# Patient Record
Sex: FEMALE | Race: WHITE | HISPANIC OR LATINO | Employment: FULL TIME | URBAN - METROPOLITAN AREA
[De-identification: names, ages, dates, MRNs, and addresses within clinical notes are randomized per-mention and may not be internally consistent; named-entity substitution may affect disease eponyms.]

---

## 2019-03-16 ENCOUNTER — HOSPITAL ENCOUNTER (INPATIENT)
Facility: MEDICAL CENTER | Age: 44
LOS: 4 days | DRG: 640 | End: 2019-03-20
Attending: EMERGENCY MEDICINE | Admitting: HOSPITALIST
Payer: MEDICAID

## 2019-03-16 ENCOUNTER — APPOINTMENT (OUTPATIENT)
Dept: RADIOLOGY | Facility: MEDICAL CENTER | Age: 44
DRG: 640 | End: 2019-03-16
Attending: EMERGENCY MEDICINE
Payer: MEDICAID

## 2019-03-16 DIAGNOSIS — E87.20 METABOLIC ACIDOSIS: ICD-10-CM

## 2019-03-16 DIAGNOSIS — G92.9 TOXIC ENCEPHALOPATHY: ICD-10-CM

## 2019-03-16 DIAGNOSIS — E87.20 LACTIC ACID INCREASED: ICD-10-CM

## 2019-03-16 DIAGNOSIS — R44.3 HALLUCINATIONS: ICD-10-CM

## 2019-03-16 LAB
ALBUMIN SERPL BCP-MCNC: 3.9 G/DL (ref 3.2–4.9)
ALBUMIN/GLOB SERPL: 1.3 G/DL
ALP SERPL-CCNC: 101 U/L (ref 30–99)
ALT SERPL-CCNC: 52 U/L (ref 2–50)
AMPHET UR QL SCN: NEGATIVE
ANION GAP SERPL CALC-SCNC: 26 MMOL/L (ref 0–11.9)
APAP SERPL-MCNC: <10 UG/ML (ref 10–30)
APPEARANCE UR: ABNORMAL
AST SERPL-CCNC: 51 U/L (ref 12–45)
B-OH-BUTYR SERPL-MCNC: 0.74 MMOL/L (ref 0.02–0.27)
BARBITURATES UR QL SCN: NEGATIVE
BASOPHILS # BLD AUTO: 0.4 % (ref 0–1.8)
BASOPHILS # BLD: 0.06 K/UL (ref 0–0.12)
BENZODIAZ UR QL SCN: NEGATIVE
BILIRUB SERPL-MCNC: 0.6 MG/DL (ref 0.1–1.5)
BILIRUB UR QL STRIP.AUTO: NEGATIVE
BUN SERPL-MCNC: 22 MG/DL (ref 8–22)
BZE UR QL SCN: NEGATIVE
CALCIUM SERPL-MCNC: 8 MG/DL (ref 8.5–10.5)
CANNABINOIDS UR QL SCN: NEGATIVE
CHLORIDE SERPL-SCNC: 103 MMOL/L (ref 96–112)
CO2 SERPL-SCNC: 6 MMOL/L (ref 20–33)
COLOR UR: YELLOW
CREAT SERPL-MCNC: 1.24 MG/DL (ref 0.5–1.4)
EKG IMPRESSION: NORMAL
EOSINOPHIL # BLD AUTO: 0 K/UL (ref 0–0.51)
EOSINOPHIL NFR BLD: 0 % (ref 0–6.9)
EPI CELLS #/AREA URNS HPF: NORMAL /HPF
ERYTHROCYTE [DISTWIDTH] IN BLOOD BY AUTOMATED COUNT: 46.2 FL (ref 35.9–50)
GLOBULIN SER CALC-MCNC: 2.9 G/DL (ref 1.9–3.5)
GLUCOSE BLD-MCNC: 152 MG/DL (ref 65–99)
GLUCOSE SERPL-MCNC: 167 MG/DL (ref 65–99)
GLUCOSE UR STRIP.AUTO-MCNC: NEGATIVE MG/DL
HCG SERPL QL: NEGATIVE
HCT VFR BLD AUTO: 41.9 % (ref 37–47)
HGB BLD-MCNC: 14 G/DL (ref 12–16)
IMM GRANULOCYTES # BLD AUTO: 0.21 K/UL (ref 0–0.11)
IMM GRANULOCYTES NFR BLD AUTO: 1.3 % (ref 0–0.9)
KETONES UR STRIP.AUTO-MCNC: 40 MG/DL
LACTATE BLD-SCNC: 12.6 MMOL/L (ref 0.5–2)
LEUKOCYTE ESTERASE UR QL STRIP.AUTO: NEGATIVE
LIPASE SERPL-CCNC: 93 U/L (ref 11–82)
LYMPHOCYTES # BLD AUTO: 0.78 K/UL (ref 1–4.8)
LYMPHOCYTES NFR BLD: 5 % (ref 22–41)
MCH RBC QN AUTO: 34.1 PG (ref 27–33)
MCHC RBC AUTO-ENTMCNC: 33.4 G/DL (ref 33.6–35)
MCV RBC AUTO: 102.2 FL (ref 81.4–97.8)
METHADONE UR QL SCN: NEGATIVE
MICRO URNS: ABNORMAL
MONOCYTES # BLD AUTO: 0.6 K/UL (ref 0–0.85)
MONOCYTES NFR BLD AUTO: 3.8 % (ref 0–13.4)
NEUTROPHILS # BLD AUTO: 13.99 K/UL (ref 2–7.15)
NEUTROPHILS NFR BLD: 89.5 % (ref 44–72)
NITRITE UR QL STRIP.AUTO: NEGATIVE
NRBC # BLD AUTO: 0 K/UL
NRBC BLD-RTO: 0 /100 WBC
OPIATES UR QL SCN: NEGATIVE
OSMOLALITY SERPL: 346 MOSM/KG H2O (ref 278–298)
OSMOLALITY UR: 629 MOSM/KG H2O (ref 300–900)
OXYCODONE UR QL SCN: NEGATIVE
PCP UR QL SCN: NEGATIVE
PH UR STRIP.AUTO: 5.5 [PH]
PLATELET # BLD AUTO: 240 K/UL (ref 164–446)
PMV BLD AUTO: 9.4 FL (ref 9–12.9)
POC BREATHALIZER: 0.12 PERCENT (ref 0–0.01)
POTASSIUM SERPL-SCNC: 4.4 MMOL/L (ref 3.6–5.5)
PROPOXYPH UR QL SCN: NEGATIVE
PROT SERPL-MCNC: 6.8 G/DL (ref 6–8.2)
PROT UR QL STRIP: 30 MG/DL
RBC # BLD AUTO: 4.1 M/UL (ref 4.2–5.4)
RBC UR QL AUTO: ABNORMAL
SALICYLATES SERPL-MCNC: 0 MG/DL (ref 15–25)
SODIUM SERPL-SCNC: 135 MMOL/L (ref 135–145)
SP GR UR STRIP.AUTO: >=1.03
UROBILINOGEN UR STRIP.AUTO-MCNC: 0.2 MG/DL
WBC # BLD AUTO: 15.6 K/UL (ref 4.8–10.8)

## 2019-03-16 PROCEDURE — 83935 ASSAY OF URINE OSMOLALITY: CPT

## 2019-03-16 PROCEDURE — 80048 BASIC METABOLIC PNL TOTAL CA: CPT

## 2019-03-16 PROCEDURE — 99291 CRITICAL CARE FIRST HOUR: CPT

## 2019-03-16 PROCEDURE — 85610 PROTHROMBIN TIME: CPT

## 2019-03-16 PROCEDURE — 302970 POC BREATHALIZER: Performed by: EMERGENCY MEDICINE

## 2019-03-16 PROCEDURE — 85025 COMPLETE CBC W/AUTO DIFF WBC: CPT

## 2019-03-16 PROCEDURE — 87040 BLOOD CULTURE FOR BACTERIA: CPT | Mod: 91

## 2019-03-16 PROCEDURE — 83690 ASSAY OF LIPASE: CPT

## 2019-03-16 PROCEDURE — 99223 1ST HOSP IP/OBS HIGH 75: CPT | Performed by: HOSPITALIST

## 2019-03-16 PROCEDURE — 80307 DRUG TEST PRSMV CHEM ANLYZR: CPT

## 2019-03-16 PROCEDURE — 81001 URINALYSIS AUTO W/SCOPE: CPT

## 2019-03-16 PROCEDURE — 700101 HCHG RX REV CODE 250: Performed by: HOSPITALIST

## 2019-03-16 PROCEDURE — 70450 CT HEAD/BRAIN W/O DYE: CPT

## 2019-03-16 PROCEDURE — 82010 KETONE BODYS QUAN: CPT

## 2019-03-16 PROCEDURE — 83930 ASSAY OF BLOOD OSMOLALITY: CPT

## 2019-03-16 PROCEDURE — 93005 ELECTROCARDIOGRAM TRACING: CPT | Performed by: EMERGENCY MEDICINE

## 2019-03-16 PROCEDURE — 93005 ELECTROCARDIOGRAM TRACING: CPT

## 2019-03-16 PROCEDURE — 85730 THROMBOPLASTIN TIME PARTIAL: CPT

## 2019-03-16 PROCEDURE — 80053 COMPREHEN METABOLIC PANEL: CPT

## 2019-03-16 PROCEDURE — 71045 X-RAY EXAM CHEST 1 VIEW: CPT

## 2019-03-16 PROCEDURE — 96365 THER/PROPH/DIAG IV INF INIT: CPT

## 2019-03-16 PROCEDURE — 770022 HCHG ROOM/CARE - ICU (200)

## 2019-03-16 PROCEDURE — 83605 ASSAY OF LACTIC ACID: CPT

## 2019-03-16 PROCEDURE — 84703 CHORIONIC GONADOTROPIN ASSAY: CPT

## 2019-03-16 PROCEDURE — 82962 GLUCOSE BLOOD TEST: CPT

## 2019-03-16 PROCEDURE — 71045 X-RAY EXAM CHEST 1 VIEW: CPT | Performed by: RADIOLOGY

## 2019-03-16 RX ORDER — LORAZEPAM 2 MG/ML
1 INJECTION INTRAMUSCULAR
Status: DISCONTINUED | OUTPATIENT
Start: 2019-03-16 | End: 2019-03-20 | Stop reason: HOSPADM

## 2019-03-16 RX ORDER — SODIUM CHLORIDE 9 MG/ML
500 INJECTION, SOLUTION INTRAVENOUS
Status: DISCONTINUED | OUTPATIENT
Start: 2019-03-16 | End: 2019-03-18

## 2019-03-16 RX ORDER — LORAZEPAM 2 MG/ML
2 INJECTION INTRAMUSCULAR
Status: DISCONTINUED | OUTPATIENT
Start: 2019-03-16 | End: 2019-03-16

## 2019-03-16 RX ORDER — LORAZEPAM 2 MG/ML
0.5 INJECTION INTRAMUSCULAR EVERY 4 HOURS PRN
Status: DISCONTINUED | OUTPATIENT
Start: 2019-03-16 | End: 2019-03-16

## 2019-03-16 RX ORDER — ONDANSETRON 4 MG/1
4 TABLET, ORALLY DISINTEGRATING ORAL EVERY 4 HOURS PRN
Status: DISCONTINUED | OUTPATIENT
Start: 2019-03-16 | End: 2019-03-20 | Stop reason: HOSPADM

## 2019-03-16 RX ORDER — ACETAMINOPHEN 500 MG
500-1000 TABLET ORAL EVERY 6 HOURS PRN
Status: ON HOLD | COMMUNITY
End: 2019-03-20

## 2019-03-16 RX ORDER — POLYETHYLENE GLYCOL 3350 17 G/17G
1 POWDER, FOR SOLUTION ORAL
Status: DISCONTINUED | OUTPATIENT
Start: 2019-03-16 | End: 2019-03-18

## 2019-03-16 RX ORDER — LORAZEPAM 2 MG/ML
1.5 INJECTION INTRAMUSCULAR
Status: DISCONTINUED | OUTPATIENT
Start: 2019-03-16 | End: 2019-03-16

## 2019-03-16 RX ORDER — LORAZEPAM 0.5 MG/1
0.5 TABLET ORAL EVERY 4 HOURS PRN
Status: DISCONTINUED | OUTPATIENT
Start: 2019-03-16 | End: 2019-03-20 | Stop reason: HOSPADM

## 2019-03-16 RX ORDER — ONDANSETRON 2 MG/ML
4 INJECTION INTRAMUSCULAR; INTRAVENOUS EVERY 4 HOURS PRN
Status: DISCONTINUED | OUTPATIENT
Start: 2019-03-16 | End: 2019-03-20 | Stop reason: HOSPADM

## 2019-03-16 RX ORDER — LORAZEPAM 2 MG/ML
1 INJECTION INTRAMUSCULAR
Status: DISCONTINUED | OUTPATIENT
Start: 2019-03-16 | End: 2019-03-16

## 2019-03-16 RX ORDER — HEPARIN SODIUM 5000 [USP'U]/ML
5000 INJECTION, SOLUTION INTRAVENOUS; SUBCUTANEOUS EVERY 8 HOURS
Status: DISCONTINUED | OUTPATIENT
Start: 2019-03-16 | End: 2019-03-20 | Stop reason: HOSPADM

## 2019-03-16 RX ORDER — THIAMINE MONONITRATE (VIT B1) 100 MG
100 TABLET ORAL DAILY
Status: DISCONTINUED | OUTPATIENT
Start: 2019-03-17 | End: 2019-03-16

## 2019-03-16 RX ORDER — FOLIC ACID 1 MG/1
1 TABLET ORAL DAILY
Status: COMPLETED | OUTPATIENT
Start: 2019-03-17 | End: 2019-03-20

## 2019-03-16 RX ORDER — AMOXICILLIN 250 MG
2 CAPSULE ORAL 2 TIMES DAILY
Status: DISCONTINUED | OUTPATIENT
Start: 2019-03-16 | End: 2019-03-18

## 2019-03-16 RX ORDER — LORAZEPAM 2 MG/ML
1.5 INJECTION INTRAMUSCULAR
Status: DISCONTINUED | OUTPATIENT
Start: 2019-03-16 | End: 2019-03-20 | Stop reason: HOSPADM

## 2019-03-16 RX ORDER — LORAZEPAM 0.5 MG/1
1 TABLET ORAL EVERY 4 HOURS PRN
Status: DISCONTINUED | OUTPATIENT
Start: 2019-03-16 | End: 2019-03-20 | Stop reason: HOSPADM

## 2019-03-16 RX ORDER — LORAZEPAM 1 MG/1
3 TABLET ORAL
Status: DISCONTINUED | OUTPATIENT
Start: 2019-03-16 | End: 2019-03-20 | Stop reason: HOSPADM

## 2019-03-16 RX ORDER — PROMETHAZINE HYDROCHLORIDE 25 MG/1
12.5-25 TABLET ORAL EVERY 4 HOURS PRN
Status: DISCONTINUED | OUTPATIENT
Start: 2019-03-16 | End: 2019-03-20 | Stop reason: HOSPADM

## 2019-03-16 RX ORDER — LORAZEPAM 1 MG/1
4 TABLET ORAL
Status: DISCONTINUED | OUTPATIENT
Start: 2019-03-16 | End: 2019-03-20 | Stop reason: HOSPADM

## 2019-03-16 RX ORDER — LORAZEPAM 1 MG/1
2 TABLET ORAL
Status: DISCONTINUED | OUTPATIENT
Start: 2019-03-16 | End: 2019-03-16

## 2019-03-16 RX ORDER — THIAMINE MONONITRATE (VIT B1) 100 MG
100 TABLET ORAL DAILY
Status: COMPLETED | OUTPATIENT
Start: 2019-03-17 | End: 2019-03-20

## 2019-03-16 RX ORDER — LORAZEPAM 1 MG/1
3 TABLET ORAL
Status: DISCONTINUED | OUTPATIENT
Start: 2019-03-16 | End: 2019-03-16

## 2019-03-16 RX ORDER — PROMETHAZINE HYDROCHLORIDE 25 MG/1
12.5-25 SUPPOSITORY RECTAL EVERY 4 HOURS PRN
Status: DISCONTINUED | OUTPATIENT
Start: 2019-03-16 | End: 2019-03-20 | Stop reason: HOSPADM

## 2019-03-16 RX ORDER — FOLIC ACID 1 MG/1
1 TABLET ORAL DAILY
Status: DISCONTINUED | OUTPATIENT
Start: 2019-03-17 | End: 2019-03-16

## 2019-03-16 RX ORDER — BISACODYL 10 MG
10 SUPPOSITORY, RECTAL RECTAL
Status: DISCONTINUED | OUTPATIENT
Start: 2019-03-16 | End: 2019-03-18

## 2019-03-16 RX ORDER — LORAZEPAM 1 MG/1
1 TABLET ORAL EVERY 4 HOURS PRN
Status: DISCONTINUED | OUTPATIENT
Start: 2019-03-16 | End: 2019-03-16

## 2019-03-16 RX ORDER — LORAZEPAM 1 MG/1
0.5 TABLET ORAL EVERY 4 HOURS PRN
Status: DISCONTINUED | OUTPATIENT
Start: 2019-03-16 | End: 2019-03-16

## 2019-03-16 RX ORDER — LORAZEPAM 2 MG/ML
0.5 INJECTION INTRAMUSCULAR EVERY 4 HOURS PRN
Status: DISCONTINUED | OUTPATIENT
Start: 2019-03-16 | End: 2019-03-20 | Stop reason: HOSPADM

## 2019-03-16 RX ORDER — LORAZEPAM 0.5 MG/1
2 TABLET ORAL
Status: DISCONTINUED | OUTPATIENT
Start: 2019-03-16 | End: 2019-03-20 | Stop reason: HOSPADM

## 2019-03-16 RX ORDER — LORAZEPAM 1 MG/1
4 TABLET ORAL
Status: DISCONTINUED | OUTPATIENT
Start: 2019-03-16 | End: 2019-03-16

## 2019-03-16 RX ORDER — DEXTROSE, SODIUM CHLORIDE, SODIUM LACTATE, POTASSIUM CHLORIDE, AND CALCIUM CHLORIDE 5; .6; .31; .03; .02 G/100ML; G/100ML; G/100ML; G/100ML; G/100ML
INJECTION, SOLUTION INTRAVENOUS CONTINUOUS
Status: DISCONTINUED | OUTPATIENT
Start: 2019-03-17 | End: 2019-03-19

## 2019-03-16 RX ORDER — LORAZEPAM 2 MG/ML
2 INJECTION INTRAMUSCULAR
Status: DISCONTINUED | OUTPATIENT
Start: 2019-03-16 | End: 2019-03-20 | Stop reason: HOSPADM

## 2019-03-16 RX ORDER — LORAZEPAM 2 MG/ML
1 INJECTION INTRAMUSCULAR ONCE
Status: DISCONTINUED | OUTPATIENT
Start: 2019-03-16 | End: 2019-03-16

## 2019-03-16 RX ADMIN — THIAMINE HYDROCHLORIDE: 100 INJECTION, SOLUTION INTRAMUSCULAR; INTRAVENOUS at 23:35

## 2019-03-17 ENCOUNTER — APPOINTMENT (OUTPATIENT)
Dept: RADIOLOGY | Facility: MEDICAL CENTER | Age: 44
DRG: 640 | End: 2019-03-17
Attending: INTERNAL MEDICINE
Payer: MEDICAID

## 2019-03-17 PROBLEM — T52.8X1A METABOLIC ACIDOSIS DUE TO ETHYLENE GLYCOL: Status: ACTIVE | Noted: 2019-03-17

## 2019-03-17 PROBLEM — R74.8 HIGH SERUM OSMOLAR GAP: Status: ACTIVE | Noted: 2019-03-17

## 2019-03-17 PROBLEM — R79.89 ABNORMAL LFTS: Status: ACTIVE | Noted: 2019-03-17

## 2019-03-17 PROBLEM — F10.920 ACUTE ALCOHOLIC INTOXICATION WITHOUT COMPLICATION (HCC): Status: ACTIVE | Noted: 2019-03-17

## 2019-03-17 PROBLEM — E87.20 LACTIC ACIDOSIS: Status: ACTIVE | Noted: 2019-03-17

## 2019-03-17 PROBLEM — R44.3 HALLUCINATIONS: Status: ACTIVE | Noted: 2019-03-17

## 2019-03-17 PROBLEM — E87.29 METABOLIC ACIDOSIS DUE TO ETHYLENE GLYCOL: Status: ACTIVE | Noted: 2019-03-17

## 2019-03-17 PROBLEM — D72.829 LEUKOCYTOSIS: Status: ACTIVE | Noted: 2019-03-17

## 2019-03-17 PROBLEM — R41.0 DISORIENTATION: Status: ACTIVE | Noted: 2019-03-17

## 2019-03-17 LAB
ACTION RANGE TRIGGERED IACRT: YES
ANION GAP SERPL CALC-SCNC: 16 MMOL/L (ref 0–11.9)
ANION GAP SERPL CALC-SCNC: 20 MMOL/L (ref 0–11.9)
ANION GAP SERPL CALC-SCNC: 3 MMOL/L (ref 0–11.9)
ANION GAP SERPL CALC-SCNC: 5 MMOL/L (ref 0–11.9)
ANION GAP SERPL CALC-SCNC: 6 MMOL/L (ref 0–11.9)
ANION GAP SERPL CALC-SCNC: 8 MMOL/L (ref 0–11.9)
APTT PPP: 29.2 SEC (ref 24.7–36)
BASE EXCESS BLDA CALC-SCNC: -19 MMOL/L (ref -4–3)
BASOPHILS # BLD AUTO: 0.3 % (ref 0–1.8)
BASOPHILS # BLD: 0.04 K/UL (ref 0–0.12)
BODY TEMPERATURE: ABNORMAL DEGREES
BUN SERPL-MCNC: 15 MG/DL (ref 8–22)
BUN SERPL-MCNC: 19 MG/DL (ref 8–22)
BUN SERPL-MCNC: 4 MG/DL (ref 8–22)
BUN SERPL-MCNC: 5 MG/DL (ref 8–22)
BUN SERPL-MCNC: 8 MG/DL (ref 8–22)
BUN SERPL-MCNC: <3 MG/DL (ref 8–22)
CALCIUM SERPL-MCNC: 7 MG/DL (ref 8.5–10.5)
CALCIUM SERPL-MCNC: 7.1 MG/DL (ref 8.5–10.5)
CALCIUM SERPL-MCNC: 7.2 MG/DL (ref 8.5–10.5)
CALCIUM SERPL-MCNC: 7.5 MG/DL (ref 8.5–10.5)
CALCIUM SERPL-MCNC: 7.6 MG/DL (ref 8.5–10.5)
CALCIUM SERPL-MCNC: 7.8 MG/DL (ref 8.5–10.5)
CHLORIDE SERPL-SCNC: 100 MMOL/L (ref 96–112)
CHLORIDE SERPL-SCNC: 102 MMOL/L (ref 96–112)
CHLORIDE SERPL-SCNC: 102 MMOL/L (ref 96–112)
CHLORIDE SERPL-SCNC: 105 MMOL/L (ref 96–112)
CHLORIDE SERPL-SCNC: 105 MMOL/L (ref 96–112)
CHLORIDE SERPL-SCNC: 110 MMOL/L (ref 96–112)
CO2 BLDA-SCNC: 7 MMOL/L (ref 20–33)
CO2 SERPL-SCNC: 25 MMOL/L (ref 20–33)
CO2 SERPL-SCNC: 30 MMOL/L (ref 20–33)
CO2 SERPL-SCNC: 31 MMOL/L (ref 20–33)
CO2 SERPL-SCNC: 35 MMOL/L (ref 20–33)
CO2 SERPL-SCNC: 7 MMOL/L (ref 20–33)
CO2 SERPL-SCNC: 9 MMOL/L (ref 20–33)
CREAT SERPL-MCNC: 0.33 MG/DL (ref 0.5–1.4)
CREAT SERPL-MCNC: 0.46 MG/DL (ref 0.5–1.4)
CREAT SERPL-MCNC: 0.64 MG/DL (ref 0.5–1.4)
CREAT SERPL-MCNC: 0.66 MG/DL (ref 0.5–1.4)
CREAT SERPL-MCNC: 0.82 MG/DL (ref 0.5–1.4)
CREAT SERPL-MCNC: 1.01 MG/DL (ref 0.5–1.4)
EOSINOPHIL # BLD AUTO: 0 K/UL (ref 0–0.51)
EOSINOPHIL NFR BLD: 0 % (ref 0–6.9)
ERYTHROCYTE [DISTWIDTH] IN BLOOD BY AUTOMATED COUNT: 44.3 FL (ref 35.9–50)
ETHANOL BLD-MCNC: 0.15 G/DL
GLUCOSE SERPL-MCNC: 132 MG/DL (ref 65–99)
GLUCOSE SERPL-MCNC: 134 MG/DL (ref 65–99)
GLUCOSE SERPL-MCNC: 140 MG/DL (ref 65–99)
GLUCOSE SERPL-MCNC: 149 MG/DL (ref 65–99)
GLUCOSE SERPL-MCNC: 152 MG/DL (ref 65–99)
GLUCOSE SERPL-MCNC: 185 MG/DL (ref 65–99)
HAV IGM SERPL QL IA: NEGATIVE
HBV CORE IGM SER QL: NEGATIVE
HBV SURFACE AB SERPL IA-ACNC: 40.99 MIU/ML (ref 0–10)
HBV SURFACE AG SER QL: NEGATIVE
HCG SERPL QL: NEGATIVE
HCO3 BLDA-SCNC: 6.3 MMOL/L (ref 17–25)
HCT VFR BLD AUTO: 36.3 % (ref 37–47)
HCV AB SER QL: NEGATIVE
HGB BLD-MCNC: 12.4 G/DL (ref 12–16)
HOROWITZ INDEX BLDA+IHG-RTO: 429 MM[HG]
IMM GRANULOCYTES # BLD AUTO: 0.13 K/UL (ref 0–0.11)
IMM GRANULOCYTES NFR BLD AUTO: 0.9 % (ref 0–0.9)
INR PPP: 1.27 (ref 0.87–1.13)
INST. QUALIFIED PATIENT IIQPT: YES
LACTATE BLD-SCNC: 1.5 MMOL/L (ref 0.5–2)
LACTATE BLD-SCNC: 1.7 MMOL/L (ref 0.5–2)
LACTATE BLD-SCNC: 5.9 MMOL/L (ref 0.5–2)
LYMPHOCYTES # BLD AUTO: 0.81 K/UL (ref 1–4.8)
LYMPHOCYTES NFR BLD: 5.9 % (ref 22–41)
MAGNESIUM SERPL-MCNC: 1.9 MG/DL (ref 1.5–2.5)
MCH RBC QN AUTO: 33.9 PG (ref 27–33)
MCHC RBC AUTO-ENTMCNC: 34.2 G/DL (ref 33.6–35)
MCV RBC AUTO: 99.2 FL (ref 81.4–97.8)
MONOCYTES # BLD AUTO: 0.77 K/UL (ref 0–0.85)
MONOCYTES NFR BLD AUTO: 5.6 % (ref 0–13.4)
NEUTROPHILS # BLD AUTO: 12.02 K/UL (ref 2–7.15)
NEUTROPHILS NFR BLD: 87.3 % (ref 44–72)
NRBC # BLD AUTO: 0 K/UL
NRBC BLD-RTO: 0 /100 WBC
O2/TOTAL GAS SETTING VFR VENT: 21 %
PCO2 BLDA: 16.1 MMHG (ref 26–37)
PCO2 TEMP ADJ BLDA: 16.1 MMHG (ref 26–37)
PH BLDA: 7.2 [PH] (ref 7.4–7.5)
PH TEMP ADJ BLDA: 7.2 [PH] (ref 7.4–7.5)
PHOSPHATE SERPL-MCNC: 1 MG/DL (ref 2.5–4.5)
PLATELET # BLD AUTO: 181 K/UL (ref 164–446)
PMV BLD AUTO: 9.4 FL (ref 9–12.9)
PO2 BLDA: 90 MMHG (ref 64–87)
PO2 TEMP ADJ BLDA: 89 MMHG (ref 64–87)
POTASSIUM SERPL-SCNC: 2.8 MMOL/L (ref 3.6–5.5)
POTASSIUM SERPL-SCNC: 3.1 MMOL/L (ref 3.6–5.5)
POTASSIUM SERPL-SCNC: 3.1 MMOL/L (ref 3.6–5.5)
POTASSIUM SERPL-SCNC: 3.7 MMOL/L (ref 3.6–5.5)
POTASSIUM SERPL-SCNC: 4.7 MMOL/L (ref 3.6–5.5)
POTASSIUM SERPL-SCNC: 4.8 MMOL/L (ref 3.6–5.5)
PROTHROMBIN TIME: 16 SEC (ref 12–14.6)
RBC # BLD AUTO: 3.66 M/UL (ref 4.2–5.4)
SAO2 % BLDA: 95 % (ref 93–99)
SODIUM SERPL-SCNC: 132 MMOL/L (ref 135–145)
SODIUM SERPL-SCNC: 135 MMOL/L (ref 135–145)
SODIUM SERPL-SCNC: 136 MMOL/L (ref 135–145)
SODIUM SERPL-SCNC: 137 MMOL/L (ref 135–145)
SODIUM SERPL-SCNC: 138 MMOL/L (ref 135–145)
SODIUM SERPL-SCNC: 141 MMOL/L (ref 135–145)
SPECIMEN DRAWN FROM PATIENT: ABNORMAL
TROPONIN I SERPL-MCNC: 0.04 NG/ML (ref 0–0.04)
WBC # BLD AUTO: 13.8 K/UL (ref 4.8–10.8)

## 2019-03-17 PROCEDURE — 71045 X-RAY EXAM CHEST 1 VIEW: CPT | Performed by: RADIOLOGY

## 2019-03-17 PROCEDURE — G0480 DRUG TEST DEF 1-7 CLASSES: HCPCS

## 2019-03-17 PROCEDURE — 99292 CRITICAL CARE ADDL 30 MIN: CPT | Mod: 25 | Performed by: INTERNAL MEDICINE

## 2019-03-17 PROCEDURE — C1751 CATH, INF, PER/CENT/MIDLINE: HCPCS

## 2019-03-17 PROCEDURE — 36556 INSERT NON-TUNNEL CV CATH: CPT

## 2019-03-17 PROCEDURE — 85025 COMPLETE CBC W/AUTO DIFF WBC: CPT

## 2019-03-17 PROCEDURE — 36556 INSERT NON-TUNNEL CV CATH: CPT | Mod: RT | Performed by: INTERNAL MEDICINE

## 2019-03-17 PROCEDURE — 99233 SBSQ HOSP IP/OBS HIGH 50: CPT | Performed by: HOSPITALIST

## 2019-03-17 PROCEDURE — 84484 ASSAY OF TROPONIN QUANT: CPT

## 2019-03-17 PROCEDURE — 82803 BLOOD GASES ANY COMBINATION: CPT

## 2019-03-17 PROCEDURE — 99291 CRITICAL CARE FIRST HOUR: CPT | Mod: 25 | Performed by: INTERNAL MEDICINE

## 2019-03-17 PROCEDURE — 700111 HCHG RX REV CODE 636 W/ 250 OVERRIDE (IP): Performed by: INTERNAL MEDICINE

## 2019-03-17 PROCEDURE — 700102 HCHG RX REV CODE 250 W/ 637 OVERRIDE(OP): Performed by: INTERNAL MEDICINE

## 2019-03-17 PROCEDURE — 700105 HCHG RX REV CODE 258: Performed by: INTERNAL MEDICINE

## 2019-03-17 PROCEDURE — 71045 X-RAY EXAM CHEST 1 VIEW: CPT

## 2019-03-17 PROCEDURE — 700102 HCHG RX REV CODE 250 W/ 637 OVERRIDE(OP): Performed by: HOSPITALIST

## 2019-03-17 PROCEDURE — 82693 ASSAY OF ETHYLENE GLYCOL: CPT

## 2019-03-17 PROCEDURE — 84100 ASSAY OF PHOSPHORUS: CPT

## 2019-03-17 PROCEDURE — 99292 CRITICAL CARE ADDL 30 MIN: CPT | Performed by: INTERNAL MEDICINE

## 2019-03-17 PROCEDURE — B548ZZA ULTRASONOGRAPHY OF SUPERIOR VENA CAVA, GUIDANCE: ICD-10-PCS | Performed by: INTERNAL MEDICINE

## 2019-03-17 PROCEDURE — 83735 ASSAY OF MAGNESIUM: CPT

## 2019-03-17 PROCEDURE — A9270 NON-COVERED ITEM OR SERVICE: HCPCS | Performed by: INTERNAL MEDICINE

## 2019-03-17 PROCEDURE — 5A1D70Z PERFORMANCE OF URINARY FILTRATION, INTERMITTENT, LESS THAN 6 HOURS PER DAY: ICD-10-PCS | Performed by: INTERNAL MEDICINE

## 2019-03-17 PROCEDURE — 02HV33Z INSERTION OF INFUSION DEVICE INTO SUPERIOR VENA CAVA, PERCUTANEOUS APPROACH: ICD-10-PCS | Performed by: INTERNAL MEDICINE

## 2019-03-17 PROCEDURE — 36600 WITHDRAWAL OF ARTERIAL BLOOD: CPT

## 2019-03-17 PROCEDURE — 80074 ACUTE HEPATITIS PANEL: CPT

## 2019-03-17 PROCEDURE — A9270 NON-COVERED ITEM OR SERVICE: HCPCS | Performed by: HOSPITALIST

## 2019-03-17 PROCEDURE — 80048 BASIC METABOLIC PNL TOTAL CA: CPT | Mod: 91

## 2019-03-17 PROCEDURE — 700101 HCHG RX REV CODE 250: Performed by: INTERNAL MEDICINE

## 2019-03-17 PROCEDURE — 700111 HCHG RX REV CODE 636 W/ 250 OVERRIDE (IP): Performed by: HOSPITALIST

## 2019-03-17 PROCEDURE — 84703 CHORIONIC GONADOTROPIN ASSAY: CPT

## 2019-03-17 PROCEDURE — 90935 HEMODIALYSIS ONE EVALUATION: CPT

## 2019-03-17 PROCEDURE — 83605 ASSAY OF LACTIC ACID: CPT | Mod: 91

## 2019-03-17 PROCEDURE — 770022 HCHG ROOM/CARE - ICU (200)

## 2019-03-17 PROCEDURE — 99221 1ST HOSP IP/OBS SF/LOW 40: CPT | Performed by: INTERNAL MEDICINE

## 2019-03-17 PROCEDURE — 700105 HCHG RX REV CODE 258: Performed by: HOSPITALIST

## 2019-03-17 PROCEDURE — 86706 HEP B SURFACE ANTIBODY: CPT

## 2019-03-17 RX ORDER — ACETAMINOPHEN 325 MG/1
650 TABLET ORAL EVERY 4 HOURS PRN
Status: DISCONTINUED | OUTPATIENT
Start: 2019-03-17 | End: 2019-03-20 | Stop reason: HOSPADM

## 2019-03-17 RX ORDER — HEPARIN SODIUM 1000 [USP'U]/ML
3500 INJECTION, SOLUTION INTRAVENOUS; SUBCUTANEOUS
Status: COMPLETED | OUTPATIENT
Start: 2019-03-17 | End: 2019-03-17

## 2019-03-17 RX ORDER — THIAMINE MONONITRATE (VIT B1) 100 MG
50 TABLET ORAL DAILY
Status: DISCONTINUED | OUTPATIENT
Start: 2019-03-17 | End: 2019-03-17

## 2019-03-17 RX ORDER — HEPARIN SODIUM 1000 [USP'U]/ML
3000 INJECTION, SOLUTION INTRAVENOUS; SUBCUTANEOUS
Status: DISCONTINUED | OUTPATIENT
Start: 2019-03-17 | End: 2019-03-20 | Stop reason: HOSPADM

## 2019-03-17 RX ORDER — FOLIC ACID 1 MG/1
1 TABLET ORAL DAILY
Status: DISCONTINUED | OUTPATIENT
Start: 2019-03-17 | End: 2019-03-17

## 2019-03-17 RX ORDER — SODIUM CHLORIDE 9 MG/ML
250 INJECTION, SOLUTION INTRAVENOUS
Status: DISCONTINUED | OUTPATIENT
Start: 2019-03-17 | End: 2019-03-20 | Stop reason: HOSPADM

## 2019-03-17 RX ORDER — POTASSIUM CHLORIDE 20 MEQ/1
40 TABLET, EXTENDED RELEASE ORAL ONCE
Status: COMPLETED | OUTPATIENT
Start: 2019-03-17 | End: 2019-03-17

## 2019-03-17 RX ORDER — POTASSIUM CHLORIDE 29.8 MG/ML
40 INJECTION INTRAVENOUS ONCE
Status: COMPLETED | OUTPATIENT
Start: 2019-03-17 | End: 2019-03-18

## 2019-03-17 RX ADMIN — HEPARIN SODIUM 5000 UNITS: 5000 INJECTION, SOLUTION INTRAVENOUS; SUBCUTANEOUS at 00:56

## 2019-03-17 RX ADMIN — HEPARIN SODIUM 5000 UNITS: 5000 INJECTION, SOLUTION INTRAVENOUS; SUBCUTANEOUS at 06:44

## 2019-03-17 RX ADMIN — FOMEPIZOLE 430 MG: 1 INJECTION, SOLUTION INTRAVENOUS at 12:07

## 2019-03-17 RX ADMIN — POTASSIUM CHLORIDE 40 MEQ: 1500 TABLET, EXTENDED RELEASE ORAL at 22:04

## 2019-03-17 RX ADMIN — SODIUM CHLORIDE, SODIUM LACTATE, POTASSIUM CHLORIDE, CALCIUM CHLORIDE AND DEXTROSE MONOHYDRATE: 5; 600; 310; 30; 20 INJECTION, SOLUTION INTRAVENOUS at 14:47

## 2019-03-17 RX ADMIN — SODIUM BICARBONATE 150 MEQ: 84 INJECTION, SOLUTION INTRAVENOUS at 02:40

## 2019-03-17 RX ADMIN — HEPARIN SODIUM 5000 UNITS: 5000 INJECTION, SOLUTION INTRAVENOUS; SUBCUTANEOUS at 21:04

## 2019-03-17 RX ADMIN — ONDANSETRON 4 MG: 2 INJECTION INTRAMUSCULAR; INTRAVENOUS at 05:25

## 2019-03-17 RX ADMIN — FOMEPIZOLE 860 MG: 1 INJECTION, SOLUTION INTRAVENOUS at 10:09

## 2019-03-17 RX ADMIN — HEPARIN SODIUM 3000 UNITS: 1000 INJECTION, SOLUTION INTRAVENOUS; SUBCUTANEOUS at 11:54

## 2019-03-17 RX ADMIN — SODIUM CHLORIDE, SODIUM LACTATE, POTASSIUM CHLORIDE, CALCIUM CHLORIDE AND DEXTROSE MONOHYDRATE: 5; 600; 310; 30; 20 INJECTION, SOLUTION INTRAVENOUS at 21:10

## 2019-03-17 RX ADMIN — Medication 100 MG: at 06:39

## 2019-03-17 RX ADMIN — THERA TABS 1 TABLET: TAB at 06:39

## 2019-03-17 RX ADMIN — HEPARIN SODIUM 5000 UNITS: 5000 INJECTION, SOLUTION INTRAVENOUS; SUBCUTANEOUS at 13:10

## 2019-03-17 RX ADMIN — HEPARIN SODIUM 3500 UNITS: 1000 INJECTION, SOLUTION INTRAVENOUS; SUBCUTANEOUS at 05:30

## 2019-03-17 RX ADMIN — FOMEPIZOLE 1290 MG: 1 INJECTION, SOLUTION INTRAVENOUS at 01:37

## 2019-03-17 RX ADMIN — FOLIC ACID 1 MG: 1 TABLET ORAL at 06:39

## 2019-03-17 RX ADMIN — POTASSIUM CHLORIDE 40 MEQ: 29.8 INJECTION, SOLUTION INTRAVENOUS at 22:04

## 2019-03-17 RX ADMIN — ACETAMINOPHEN 650 MG: 325 TABLET, FILM COATED ORAL at 20:58

## 2019-03-17 RX ADMIN — SODIUM PHOSPHATE, MONOBASIC, MONOHYDRATE AND SODIUM PHOSPHATE, DIBASIC, ANHYDROUS 30 MMOL: 276; 142 INJECTION, SOLUTION INTRAVENOUS at 06:40

## 2019-03-17 RX ADMIN — ACETAMINOPHEN 650 MG: 325 TABLET, FILM COATED ORAL at 06:39

## 2019-03-17 RX ADMIN — SODIUM CHLORIDE, SODIUM LACTATE, POTASSIUM CHLORIDE, CALCIUM CHLORIDE AND DEXTROSE MONOHYDRATE: 5; 600; 310; 30; 20 INJECTION, SOLUTION INTRAVENOUS at 00:50

## 2019-03-17 RX ADMIN — ACETAMINOPHEN 650 MG: 325 TABLET, FILM COATED ORAL at 01:59

## 2019-03-17 RX ADMIN — ACETAMINOPHEN 650 MG: 325 TABLET, FILM COATED ORAL at 12:11

## 2019-03-17 ASSESSMENT — PATIENT HEALTH QUESTIONNAIRE - PHQ9
1. LITTLE INTEREST OR PLEASURE IN DOING THINGS: NOT AT ALL
SUM OF ALL RESPONSES TO PHQ9 QUESTIONS 1 AND 2: 0
2. FEELING DOWN, DEPRESSED, IRRITABLE, OR HOPELESS: NOT AT ALL
1. LITTLE INTEREST OR PLEASURE IN DOING THINGS: NOT AT ALL
SUM OF ALL RESPONSES TO PHQ9 QUESTIONS 1 AND 2: 0
2. FEELING DOWN, DEPRESSED, IRRITABLE, OR HOPELESS: NOT AT ALL

## 2019-03-17 ASSESSMENT — ENCOUNTER SYMPTOMS
ABDOMINAL PAIN: 1
SHORTNESS OF BREATH: 0
VOMITING: 0
TINGLING: 0
LOSS OF CONSCIOUSNESS: 0
HALLUCINATIONS: 1
TREMORS: 0
DIZZINESS: 0
SPUTUM PRODUCTION: 0
SORE THROAT: 0
HEADACHES: 0
SENSORY CHANGE: 0
ABDOMINAL PAIN: 0
FOCAL WEAKNESS: 0
DOUBLE VISION: 0
BLURRED VISION: 0
WEAKNESS: 1
FLANK PAIN: 0
BACK PAIN: 0
HEADACHES: 1
COUGH: 0
NECK PAIN: 0
BRUISES/BLEEDS EASILY: 0
PALPITATIONS: 0
FEVER: 0
CHILLS: 0
NERVOUS/ANXIOUS: 1
PHOTOPHOBIA: 0
DIARRHEA: 0
DEPRESSION: 0
NAUSEA: 1
SEIZURES: 0
INSOMNIA: 1

## 2019-03-17 ASSESSMENT — LIFESTYLE VARIABLES
TOTAL SCORE: 1
HAVE YOU EVER FELT YOU SHOULD CUT DOWN ON YOUR DRINKING: YES
AUDITORY DISTURBANCES: NOT PRESENT
HAVE PEOPLE ANNOYED YOU BY CRITICIZING YOUR DRINKING: NO
AGITATION: NORMAL ACTIVITY
ORIENTATION AND CLOUDING OF SENSORIUM: ORIENTED AND CAN DO SERIAL ADDITIONS
EVER FELT BAD OR GUILTY ABOUT YOUR DRINKING: NO
EVER_SMOKED: NEVER
TREMOR: NO TREMOR
CONSUMPTION TOTAL: POSITIVE
TOTAL SCORE: 1
ANXIETY: NO ANXIETY (AT EASE)
ALCOHOL_USE: YES
PAROXYSMAL SWEATS: NO SWEAT VISIBLE
HOW MANY TIMES IN THE PAST YEAR HAVE YOU HAD 5 OR MORE DRINKS IN A DAY: 2
ON A TYPICAL DAY WHEN YOU DRINK ALCOHOL HOW MANY DRINKS DO YOU HAVE: 10
AVERAGE NUMBER OF DAYS PER WEEK YOU HAVE A DRINK CONTAINING ALCOHOL: 2
HEADACHE, FULLNESS IN HEAD: MODERATE
TOTAL SCORE: 1
NAUSEA AND VOMITING: NO NAUSEA AND NO VOMITING
TOTAL SCORE: 3
VISUAL DISTURBANCES: NOT PRESENT
EVER HAD A DRINK FIRST THING IN THE MORNING TO STEADY YOUR NERVES TO GET RID OF A HANGOVER: NO
SUBSTANCE_ABUSE: 0

## 2019-03-17 NOTE — PROGRESS NOTES
Plan for emergent dialysis for toxic substance removal. Hemodialysis x 6 hours. HD orders entered, HD nurse notified. Emergent management per Dr. Gonda. Nephrology consultation to follow.

## 2019-03-17 NOTE — PROGRESS NOTES
[facility] Medicine Daily Progress Note    Date of Service  3/17/2019    Chief Complaint  44 y.o. female admitted 3/16/2019 with altered mental status    Hospital Course    pt admitted altered.  Admitted to heavy vodka intake (1 litre daily) but not sure if she drank anything else.  In ED noted to have a signficant increased gap acidosis with an osmolal gap of 25.  Started on HD for presumed ethylene glycol ingestion and admitted to the ICU      Interval Problem Update  Pt states she feels better this am.  Mild N but no V.  Feels very tired.  On HD presently  Denies any hallucinations  Sinus/sinus tach  SBP 120s  On RA  AFebrile  Tolerating po  UOP 550ml/12hrs  On bicarb and LR drips  fomeapzol    Consultants/Specialty  Nephrology  Pulmonology    Code Status  full    Disposition  Cont in ICU    Review of Systems  Review of Systems   Constitutional: Positive for malaise/fatigue. Negative for chills and fever.   Respiratory: Negative for cough and shortness of breath.    Cardiovascular: Negative for chest pain.   Gastrointestinal: Positive for nausea. Negative for abdominal pain, diarrhea and vomiting.   Musculoskeletal: Negative for back pain.   Skin: Negative for rash.   Neurological: Negative for dizziness, loss of consciousness and headaches.        Physical Exam  Temp:  [36.5 °C (97.7 °F)-37.1 °C (98.8 °F)] 36.5 °C (97.7 °F)  Pulse:  [118-137] 118  Resp:  [22-28] 25  BP: (102)/(69) 102/69  SpO2:  [95 %-100 %] 99 %    Physical Exam   Constitutional: She is oriented to person, place, and time. She appears well-developed and well-nourished. No distress.   HENT:   Head: Normocephalic and atraumatic.   Neck: No JVD present.   Cardiovascular: Regular rhythm.  Tachycardia present.    No murmur heard.  Pulmonary/Chest: Effort normal. No stridor. No respiratory distress. She has no wheezes. She has no rales.   Abdominal: Soft. There is no tenderness. There is no rebound and no guarding.   Musculoskeletal: She exhibits no  edema.   Neurological: She is oriented to person, place, and time.   Skin: Skin is warm and dry. No rash noted. She is not diaphoretic.   Psychiatric: She has a normal mood and affect. Thought content normal.   Nursing note and vitals reviewed.      Fluids    Intake/Output Summary (Last 24 hours) at 03/17/19 0546  Last data filed at 03/17/19 0400   Gross per 24 hour   Intake          1421.67 ml   Output              550 ml   Net           871.67 ml       Laboratory  Recent Labs      03/16/19 2122 03/17/19 0214   WBC  15.6*  13.8*   RBC  4.10*  3.66*   HEMOGLOBIN  14.0  12.4   HEMATOCRIT  41.9  36.3*   MCV  102.2*  99.2*   MCH  34.1*  33.9*   MCHC  33.4*  34.2   RDW  46.2  44.3   PLATELETCT  240  181   MPV  9.4  9.4     Recent Labs      03/16/19 2122 03/16/19 2317 03/17/19 0214   SODIUM  135  132*  135   POTASSIUM  4.4  4.8  4.7   CHLORIDE  103  105  110   CO2  6*  7*  9*   GLUCOSE  167*  132*  185*   BUN  22  19  15   CREATININE  1.24  1.01  0.82   CALCIUM  8.0*  7.5*  7.2*     Recent Labs      03/16/19 2122   APTT  29.2   INR  1.27*               Imaging  DX-CHEST-LIMITED (1 VIEW)   Final Result      Right IJ central venous catheter placement terminating in the SVC. No pneumothorax.      DX-CHEST-PORTABLE (1 VIEW)   Final Result      No acute cardiopulmonary abnormality.      CT-HEAD W/O   Final Result      No acute intracranial abnormality.           Assessment/Plan  * Metabolic acidosis due to ethylene glycol   Assessment & Plan    High delta gap and lactic acidosis   Cont with sodium bicarb for now   Neprhology consulted: on HD  Fomepizole empirically for now   Methanol and ethylene glycol levels sent  Pt denies knowledge of ingestion     High serum osmolar gap   Assessment & Plan    Ethylene glycol ingestion     Lactic acidosis   Assessment & Plan    High lactic 12, cont with aggressive IVF and montior lactic      Hallucinations   Assessment & Plan    Visual hallucinations on presentation since  resolved  likey due to toxic ingestion     Acute alcoholic intoxication without complication (HCC)   Assessment & Plan    Monitor for alcohol withdrawals   MVI  Thiamine   Folate      Abnormal LFTs   Assessment & Plan    Due to alcohol abuse  hepatits panel neg     Leukocytosis   Assessment & Plan    Suspect reactive, hold on abx for now   IVF           VTE prophylaxis: heparin

## 2019-03-17 NOTE — CONSULTS
Consults   Nephrology Inpatient Consultation    Date of Service  3/17/2019    Reason for Consultation  Severe acidosis, question of toxic ingestion    History of Presenting Illness  44 y.o. female admitted 3/16/2019 with a history significant for drinking with an individual she just met in a past medical history of significant alcohol intake.  She developed visual hallucinations and subsequently was brought to the hospital.  She denies to me any toxic ingestions but indicates that perhaps it would be possible that she had ingested something toxic.    The patient was found to have severe acidosis with a non-small gap.  She had an elevated lactic acid as well.  Given the history and given the presentation, high likelihood of a toxic alcohol ingestion.  She had been given Antizol.  She had a dialysis catheter placed and was started on hemodialysis times 6 hours.    She is seen this morning still on dialysis.  Laboratory studies have improved.  She is producing urine but no [place] in place at this time.    Referring Physician  Dhruv Desai M.D.    Consulting Physician  Magnus Villarreal M.D.    Review of Systems  Review of Systems   Neurological: Positive for headaches.   All other systems reviewed and are negative.     Past Medical History  No past medical history on file.    Surgical History  No past surgical history on file.    Medications  No current facility-administered medications on file prior to encounter.      No current outpatient prescriptions on file prior to encounter.       Family History  family history is not on file.    Social History  Social History   Substance Use Topics   • Smoking status: Never Smoker   • Smokeless tobacco: Never Used   • Alcohol use Yes      Comment: socially       Allergies  No Known Allergies     Physical Exam  Laboratory/Imaging   Hemodynamics  Temp (24hrs), Av.7 °C (98 °F), Min:36.5 °C (97.7 °F), Max:37.1 °C (98.8 °F)   Temperature: 36.7 °C (98 °F)  Pulse  Av.4   Min: 111  Max: 137 Heart Rate (Monitored): (!) 117  Blood Pressure: 102/69, NIBP: 103/62      Respiratory      Respiration: 20, Pulse Oximetry: 99 %     Work Of Breathing / Effort: Mild  RUL Breath Sounds: Clear, RML Breath Sounds: Clear, RLL Breath Sounds: Diminished, MIAH Breath Sounds: Clear, LLL Breath Sounds: Diminished    Fluids  Date 03/17/19 0700 - 03/18/19 0659   Shift 4123-1888 1614-2847 2661-8629 24 Hour Total   I  N  T  A  K  E   P.O. 50   50    I.V. 600   600    IV Piggyback 0   0    Shift Total 650   650   O  U  T  P  U  T   Urine 0   0    Shift Total 0   0   Weight (kg) 86 86 86 86         Nutrition  Orders Placed This Encounter   Procedures   • Diet Order Regular     Standing Status:   Standing     Number of Occurrences:   1     Order Specific Question:   Diet:     Answer:   Regular [1]       Physical Exam   Constitutional: She is oriented to person, place, and time. She appears well-developed and well-nourished.   HENT:   Head: Normocephalic and atraumatic.   Cardiovascular: Normal rate and regular rhythm.    Pulmonary/Chest: Effort normal and breath sounds normal.   Abdominal: Soft. Bowel sounds are normal.   Musculoskeletal: She exhibits no edema or tenderness.   Neurological: She is alert and oriented to person, place, and time.   Skin: Skin is warm and dry.       Recent Labs      03/16/19 2122 03/17/19 0214   WBC  15.6*  13.8*   RBC  4.10*  3.66*   HEMOGLOBIN  14.0  12.4   HEMATOCRIT  41.9  36.3*   MCV  102.2*  99.2*   MCH  34.1*  33.9*   MCHC  33.4*  34.2   RDW  46.2  44.3   PLATELETCT  240  181   MPV  9.4  9.4     Recent Labs      03/16/19   2317  03/17/19   0214  03/17/19   0627   SODIUM  132*  135  138   POTASSIUM  4.8  4.7  3.7   CHLORIDE  105  110  105   CO2  7*  9*  25   GLUCOSE  132*  185*  149*   BUN  19  15  8   CREATININE  1.01  0.82  0.46*   CALCIUM  7.5*  7.2*  7.0*     Recent Labs      03/16/19 2122   APTT  29.2   INR  1.27*                  Assessment/Plan     1.  Severe  acidosis consistent with a toxic alcohol ingestion currently on hemodialysis, completing a 6-hour run with resolution of the acidosis.  2.  Hypophosphatemia  3.  Hypocalcemia      -Toxic alcohol screens are pending still  -However, acidosis has resolved  -Replete electrolytes and monitor for recurrent acidosis requiring repeat dialysis  -I feel however that we have cleared whatever substance was causing this, and that she will likely not need repeat hemodialysis

## 2019-03-17 NOTE — H&P
Hospital Medicine History & Physical Note    Date of Service  3/16/2019    Primary Care Physician  No primary care provider on file.    Consultants  PMA    Code Status  full    Chief Complaint  Hallucinations     History of Presenting Illness  44 y.o. female who presented 3/16/2019 with Hallucinations.  This patient presented via EMS as police were called to the scene as the patient called stating that people were trying to break into her house.  She states that she was drinking significant amount of vodka about 1 L of vodka by herself yesterday and today.  She is also had visual hallucinations stating for males were trying to enter her home.  She denies any drug use.  She is confused and altered at this time.  She is however alert and oriented.  She denies ingesting any antifreeze or anything other than alcohol.  History is otherwise limited secondary to patient's mental status changes.    Review of Systems  Review of Systems   Unable to perform ROS: Mental status change       Past Medical History  Unable to review due to ams    Surgical History  Unable to review due to ams    Family History  Unable to review due to ams     Social History   reports that she has never smoked. She has never used smokeless tobacco. She reports that she drinks alcohol. She reports that she does not use drugs.    Allergies  No Known Allergies    Medications  None       Physical Exam  Temp:  [36.8 °C (98.3 °F)] 36.8 °C (98.3 °F)  Pulse:  [132] 132  Resp:  [22-23] 23  BP: (102)/(69) 102/69    Physical Exam   Constitutional: She is oriented to person, place, and time. She appears well-developed and well-nourished. She appears distressed.   HENT:   Head: Normocephalic and atraumatic.   Dry cracked lips, severe dehydration   Eyes: Pupils are equal, round, and reactive to light. Conjunctivae and EOM are normal.   Neck: Normal range of motion. Neck supple. No JVD present.   Cardiovascular: Regular rhythm, normal heart sounds and intact distal  pulses.    No murmur heard.  tachycardia   Pulmonary/Chest: Breath sounds normal. No respiratory distress. She has no wheezes.   tachypnea   Abdominal: Soft. Bowel sounds are normal. She exhibits no distension. There is tenderness.   epigastric   Musculoskeletal: Normal range of motion. She exhibits no edema.   Neurological: She is alert and oriented to person, place, and time. She exhibits normal muscle tone.   Skin: Skin is warm and dry.   Psychiatric: She has a normal mood and affect. Her behavior is normal. Judgment and thought content normal.   Nursing note and vitals reviewed.      Laboratory:  Recent Labs      03/16/19 2122   WBC  15.6*   RBC  4.10*   HEMOGLOBIN  14.0   HEMATOCRIT  41.9   MCV  102.2*   MCH  34.1*   MCHC  33.4*   RDW  46.2   PLATELETCT  240   MPV  9.4     Recent Labs      03/16/19 2122   SODIUM  135   POTASSIUM  4.4   CHLORIDE  103   CO2  6*   GLUCOSE  167*   BUN  22   CREATININE  1.24   CALCIUM  8.0*     Recent Labs      03/16/19 2122   ALTSGPT  52*   ASTSGOT  51*   ALKPHOSPHAT  101*   TBILIRUBIN  0.6   LIPASE  93*   GLUCOSE  167*                 No results for input(s): TROPONINI in the last 72 hours.    Urinalysis:    Recent Labs      03/16/19 2151   SPECGRAVITY  >=1.030   GLUCOSEUR  Negative   KETONES  40*   NITRITE  Negative   LEUKESTERAS  Negative   EPITHELCELL  Few        Imaging:  DX-CHEST-PORTABLE (1 VIEW)   Final Result      No acute cardiopulmonary abnormality.      CT-HEAD W/O   Final Result      No acute intracranial abnormality.      DX-CHEST-LIMITED (1 VIEW)    (Results Pending)         Assessment/Plan:  I anticipate this patient will require at least two midnights for appropriate medical management, necessitating inpatient admission.    * Metabolic acidosis due to ethylene glycol   Assessment & Plan    High delta gap and lactic acidosis   Cont with sodium bicarb for now   Nephrology consultation for possible emergent dialysis   Fomepizole empirically for now   Methanol  and ethylene glycol levels sent     High serum osmolar gap   Assessment & Plan    Suspected due to alcohol abuse cont to monitor      Lactic acidosis   Assessment & Plan    High lactic 12, cont with aggressive IVF and montior lactic      Hallucinations   Assessment & Plan    Visual hallucinations, will need to reassess when mental status improves     Acute alcoholic intoxication without complication (HCC)   Assessment & Plan    Monitor for alcohol withdrawals   Will need cessation counseling when mental status improves     Abnormal LFTs   Assessment & Plan    Due to alcohol abuse  hepatits panel ordered     Leukocytosis   Assessment & Plan    Suspect reactive, hold on abx for now   IVF          VTE prophylaxis: heparin

## 2019-03-17 NOTE — PROGRESS NOTES
RCC    See Dr Gonda's full note  Reviewed with ICU on Team Rounds    HD ongoing LA better    A&O x4  Hallucinations better  Patient uncomfortable with dialysis catheter right neck, it is functioning normally however, no significant bleeding  ST 120s  SBp 110s  I/O +1.43 L  Bicarb drip infusion ongoing  D5LR infusion ongoing  Fomipazole antidote therapy for glycol overdose ongoing    Afeb  Remains quite tachycardic with a heart rate in the 110-130 range, sinus tach on the monitor, blood pressure acceptable in the 100-110 range throughout most of the day  Work of breathing improved as acidosis resolved  Room air, mild shortness of breath but not labored  Clear liq diet tolerated poorly, still having nausea  No BM, no bleeding  No edwards, monitoring urine output    Lactic acid coming down from 5.9-1.7 this a.m. with ongoing dialysis and time  Bicarb up from 9-25  Mg 1.9, phosphorus 1.0   K 3.7, sodium 135  Follow-up chest x-ray good position the catheter no pneumothorax no other abnormalities on chest x-ray    Updated patient at great length the bedside, patient denies any ingestion but vodka, stated may be someone slipped her something?  Reviewed case at great length with nephrology at the bedside    Patient assessed and reassessed  Metabolic studies reveal resolving acidosis with normalization of anion gap and bicarb level now normal to supranormal  Lactic acid on follow-up normal 1.5  Potassium trending down to 3.1 more aggressive repletion ongoing    The patient remains critically ill.  Critical care time = 40 minutes in directly providing and coordinating critical care and extensive data review.  No time overlap and excludes procedures.

## 2019-03-17 NOTE — PROGRESS NOTES
Lab called with critical result of CO2 9 and phos 1 at 0313 . Critical lab result read back.   Dr. Gonda notified of critical lab result at 0313 at bedside.  Critical lab result read back by Dr. Gonda.

## 2019-03-17 NOTE — PROCEDURES
Procedure Note    Date: 3/17/2019  Time: 0220    Procedure: Temporary non-tunneled hemodialysis Line placement  Site: Right IJ vein    Indication: Need for emergent hemodialysis  Consent: Informed consent obtained from patient or designated decision maker after explaining the benefits/risks of the procedure including but not limited to bleeding, infection, nerve or other deep structure injury, pneumothorax/hemothorax, arrythmia, or death. Patient or surrogate expressed understanding and agreement and signed consent which can be found in the patient's chart.    Procedure: After obtaining consent, a time-out was performed. Appropriate site confirmed with ultrasound and patient positioned, prepped, and draped in sterile fashion. All those present wearing cap and mask and those physically participating remained adhering to sterile fashion with cap, mask, gloves, and gown.  5 mL of local anesthetic injected (1% lidocaine without epinephrine) achieving appropriate comfort level for patient. Vein localized and accessed using continuous ultrasound guidance and a 12 Fr 16 cm dialysis catheter placed using Seldinger technique. Able to aspirate dark, non-pulsatile blood through each lumen and sterile saline flushed easily before capping. Line secured and dressed in sterile fashion. Patient tolerated procedure well without any difficulties and remains in care of bedside nurse. CXR will be performed to confirm appropriate placement for internal jugular or subclavian CVLs.    EBL: minimal  Complications: None  CXR: Pending    Jeremy Gonda, MD  Critical Care Medicine
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
## 2019-03-17 NOTE — ED NOTES
Med Rec Updated and Complete per Pt at bedside  Allergies Reviewed  No PO ABX last 30 days.    Pt denies RX medication at this time.

## 2019-03-17 NOTE — PROGRESS NOTES
Lab called with critical result of Lactic 5.9 at 0246. Critical lab result read back to Lab.   Dr. Gonda notified of critical lab result at 0246 at bedside.  Critical lab result read back by Dr. Gonda.
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
## 2019-03-17 NOTE — PROGRESS NOTES
Pt brought to [facility] on gurney by ACLS RN and CCT.  All belongings with pt.  Pt oriented to unit and updated on plan of care
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
## 2019-03-17 NOTE — ASSESSMENT & PLAN NOTE
Alcohol cessation education provided  Monitor for alcohol withdrawal syndrome  Vitamin supplementation with folic acid/thiamine/multivitamin

## 2019-03-17 NOTE — ASSESSMENT & PLAN NOTE
Presumed to be secondary to toxic alcohols (delta gap of 35, lactic acidosis), no crystals noted on UA nor vision change on physical exam  Continue sodium bicarbonate drip as maintenance IV fluids  Nephrology consultation for possible emergent dialysis  Fomepizole  Poison control consultation  Monitor kidney function, vision, mentation closely with supportive care
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
## 2019-03-17 NOTE — PROGRESS NOTES
· 2 RN skin check complete with Laney CAR RN.  · Devices in place icu monitoring equipment IVs .  · Skin assessed under devices.  · No confirmed pressure ulcers found.  · Generalized bruising on all extremities  · The following interventions in place reminders to reposition, pillows for support

## 2019-03-17 NOTE — PROGRESS NOTES
HEMODIALYSIS NOTES:     STAT HD today x 6 hours per Dr. Villarreal. Initiated at 0503 and ended at 1154. Patient tolerated treatment. Patient had headache while on tx; Dr. Villarreal seen patient and ordered for 500 total NS  Bolus. Patient AO X4. See paper flowsheet for details.    UF Net: +494 mL    R IJ intact and patent with good flow during dialysis. No s/sx of infection, no redness nor bleeding noted on the CVC site. CVC dressing clean, dry and intact. Blood returned and CVC port flushed with NS and Heparin 1000 units/mL used  to lock catheter given per designated amount. CVC port clamped and capped.     Report given to CEDRIC Lieberman RN.

## 2019-03-17 NOTE — ASSESSMENT & PLAN NOTE
Likely toxic induced  Monitor over the next 1-2 days and if no improvement then consider psychiatry consultation  Reorient and maintain sleep/wake cycle

## 2019-03-17 NOTE — CARE PLAN
Problem: Knowledge Deficit  Goal: Knowledge of disease process/condition, treatment plan, diagnostic tests, and medications will improve  Pt educated on POC and verbalized understanding.    Problem: Fluid Volume:  Goal: Will maintain balanced intake and output  I&O documented, pt receiving dialysis.

## 2019-03-18 LAB
ANION GAP SERPL CALC-SCNC: 5 MMOL/L (ref 0–11.9)
ANION GAP SERPL CALC-SCNC: 6 MMOL/L (ref 0–11.9)
ANION GAP SERPL CALC-SCNC: 8 MMOL/L (ref 0–11.9)
BASOPHILS # BLD AUTO: 0.3 % (ref 0–1.8)
BASOPHILS # BLD: 0.02 K/UL (ref 0–0.12)
BUN SERPL-MCNC: 3 MG/DL (ref 8–22)
BUN SERPL-MCNC: 3 MG/DL (ref 8–22)
BUN SERPL-MCNC: 4 MG/DL (ref 8–22)
CALCIUM SERPL-MCNC: 7.4 MG/DL (ref 8.5–10.5)
CALCIUM SERPL-MCNC: 7.6 MG/DL (ref 8.5–10.5)
CALCIUM SERPL-MCNC: 7.8 MG/DL (ref 8.5–10.5)
CHLORIDE SERPL-SCNC: 106 MMOL/L (ref 96–112)
CHLORIDE SERPL-SCNC: 107 MMOL/L (ref 96–112)
CHLORIDE SERPL-SCNC: 108 MMOL/L (ref 96–112)
CO2 SERPL-SCNC: 27 MMOL/L (ref 20–33)
CO2 SERPL-SCNC: 28 MMOL/L (ref 20–33)
CO2 SERPL-SCNC: 29 MMOL/L (ref 20–33)
CREAT SERPL-MCNC: 0.54 MG/DL (ref 0.5–1.4)
CREAT SERPL-MCNC: 0.6 MG/DL (ref 0.5–1.4)
CREAT SERPL-MCNC: 0.62 MG/DL (ref 0.5–1.4)
EOSINOPHIL # BLD AUTO: 0.03 K/UL (ref 0–0.51)
EOSINOPHIL NFR BLD: 0.5 % (ref 0–6.9)
ERYTHROCYTE [DISTWIDTH] IN BLOOD BY AUTOMATED COUNT: 42.3 FL (ref 35.9–50)
GLUCOSE SERPL-MCNC: 118 MG/DL (ref 65–99)
GLUCOSE SERPL-MCNC: 118 MG/DL (ref 65–99)
GLUCOSE SERPL-MCNC: 127 MG/DL (ref 65–99)
HCT VFR BLD AUTO: 28.9 % (ref 37–47)
HGB BLD-MCNC: 10 G/DL (ref 12–16)
IMM GRANULOCYTES # BLD AUTO: 0.02 K/UL (ref 0–0.11)
IMM GRANULOCYTES NFR BLD AUTO: 0.3 % (ref 0–0.9)
LYMPHOCYTES # BLD AUTO: 1.72 K/UL (ref 1–4.8)
LYMPHOCYTES NFR BLD: 29.3 % (ref 22–41)
MAGNESIUM SERPL-MCNC: 1.5 MG/DL (ref 1.5–2.5)
MCH RBC QN AUTO: 34.1 PG (ref 27–33)
MCHC RBC AUTO-ENTMCNC: 34.9 G/DL (ref 33.6–35)
MCV RBC AUTO: 97.9 FL (ref 81.4–97.8)
MONOCYTES # BLD AUTO: 0.17 K/UL (ref 0–0.85)
MONOCYTES NFR BLD AUTO: 2.9 % (ref 0–13.4)
NEUTROPHILS # BLD AUTO: 3.92 K/UL (ref 2–7.15)
NEUTROPHILS NFR BLD: 66.7 % (ref 44–72)
NRBC # BLD AUTO: 0 K/UL
NRBC BLD-RTO: 0 /100 WBC
PHOSPHATE SERPL-MCNC: 2.2 MG/DL (ref 2.5–4.5)
PHOSPHATE SERPL-MCNC: <1 MG/DL (ref 2.5–4.5)
PLATELET # BLD AUTO: 118 K/UL (ref 164–446)
PMV BLD AUTO: 9.7 FL (ref 9–12.9)
POTASSIUM SERPL-SCNC: 3.3 MMOL/L (ref 3.6–5.5)
POTASSIUM SERPL-SCNC: 3.3 MMOL/L (ref 3.6–5.5)
POTASSIUM SERPL-SCNC: 3.4 MMOL/L (ref 3.6–5.5)
RBC # BLD AUTO: 2.9 M/UL (ref 4.2–5.4)
SODIUM SERPL-SCNC: 141 MMOL/L (ref 135–145)
SODIUM SERPL-SCNC: 141 MMOL/L (ref 135–145)
SODIUM SERPL-SCNC: 142 MMOL/L (ref 135–145)
WBC # BLD AUTO: 5.9 K/UL (ref 4.8–10.8)

## 2019-03-18 PROCEDURE — 80048 BASIC METABOLIC PNL TOTAL CA: CPT

## 2019-03-18 PROCEDURE — 700101 HCHG RX REV CODE 250: Performed by: INTERNAL MEDICINE

## 2019-03-18 PROCEDURE — 83735 ASSAY OF MAGNESIUM: CPT

## 2019-03-18 PROCEDURE — 99232 SBSQ HOSP IP/OBS MODERATE 35: CPT | Performed by: INTERNAL MEDICINE

## 2019-03-18 PROCEDURE — 84100 ASSAY OF PHOSPHORUS: CPT

## 2019-03-18 PROCEDURE — 700102 HCHG RX REV CODE 250 W/ 637 OVERRIDE(OP): Performed by: HOSPITALIST

## 2019-03-18 PROCEDURE — 700105 HCHG RX REV CODE 258: Performed by: INTERNAL MEDICINE

## 2019-03-18 PROCEDURE — 770001 HCHG ROOM/CARE - MED/SURG/GYN PRIV*

## 2019-03-18 PROCEDURE — 99232 SBSQ HOSP IP/OBS MODERATE 35: CPT | Performed by: HOSPITALIST

## 2019-03-18 PROCEDURE — 700105 HCHG RX REV CODE 258: Performed by: HOSPITALIST

## 2019-03-18 PROCEDURE — A9270 NON-COVERED ITEM OR SERVICE: HCPCS | Performed by: INTERNAL MEDICINE

## 2019-03-18 PROCEDURE — A9270 NON-COVERED ITEM OR SERVICE: HCPCS | Performed by: HOSPITALIST

## 2019-03-18 PROCEDURE — 85025 COMPLETE CBC W/AUTO DIFF WBC: CPT

## 2019-03-18 PROCEDURE — 700102 HCHG RX REV CODE 250 W/ 637 OVERRIDE(OP): Performed by: INTERNAL MEDICINE

## 2019-03-18 PROCEDURE — 82693 ASSAY OF ETHYLENE GLYCOL: CPT

## 2019-03-18 PROCEDURE — 700111 HCHG RX REV CODE 636 W/ 250 OVERRIDE (IP): Performed by: INTERNAL MEDICINE

## 2019-03-18 PROCEDURE — 700111 HCHG RX REV CODE 636 W/ 250 OVERRIDE (IP): Performed by: HOSPITALIST

## 2019-03-18 RX ORDER — LOPERAMIDE HYDROCHLORIDE 2 MG/1
2 CAPSULE ORAL 4 TIMES DAILY PRN
Status: DISCONTINUED | OUTPATIENT
Start: 2019-03-18 | End: 2019-03-20 | Stop reason: HOSPADM

## 2019-03-18 RX ORDER — MAGNESIUM SULFATE HEPTAHYDRATE 40 MG/ML
4 INJECTION, SOLUTION INTRAVENOUS ONCE
Status: COMPLETED | OUTPATIENT
Start: 2019-03-18 | End: 2019-03-18

## 2019-03-18 RX ADMIN — POTASSIUM PHOSPHATE, MONOBASIC AND POTASSIUM PHOSPHATE, DIBASIC 15 MMOL: 224; 236 INJECTION, SOLUTION INTRAVENOUS at 19:18

## 2019-03-18 RX ADMIN — THERA TABS 1 TABLET: TAB at 06:26

## 2019-03-18 RX ADMIN — POTASSIUM PHOSPHATE, MONOBASIC AND POTASSIUM PHOSPHATE, DIBASIC 30 MMOL: 224; 236 INJECTION, SOLUTION INTRAVENOUS at 06:27

## 2019-03-18 RX ADMIN — FOMEPIZOLE 860 MG: 1 INJECTION, SOLUTION INTRAVENOUS at 00:18

## 2019-03-18 RX ADMIN — MAGNESIUM SULFATE IN WATER 4 G: 40 INJECTION, SOLUTION INTRAVENOUS at 06:27

## 2019-03-18 RX ADMIN — SODIUM CHLORIDE, SODIUM LACTATE, POTASSIUM CHLORIDE, CALCIUM CHLORIDE AND DEXTROSE MONOHYDRATE: 5; 600; 310; 30; 20 INJECTION, SOLUTION INTRAVENOUS at 04:21

## 2019-03-18 RX ADMIN — SODIUM CHLORIDE, SODIUM LACTATE, POTASSIUM CHLORIDE, CALCIUM CHLORIDE AND DEXTROSE MONOHYDRATE: 5; 600; 310; 30; 20 INJECTION, SOLUTION INTRAVENOUS at 19:22

## 2019-03-18 RX ADMIN — SODIUM CHLORIDE, SODIUM LACTATE, POTASSIUM CHLORIDE, CALCIUM CHLORIDE AND DEXTROSE MONOHYDRATE: 5; 600; 310; 30; 20 INJECTION, SOLUTION INTRAVENOUS at 11:09

## 2019-03-18 RX ADMIN — Medication 100 MG: at 06:26

## 2019-03-18 RX ADMIN — HEPARIN SODIUM 5000 UNITS: 5000 INJECTION, SOLUTION INTRAVENOUS; SUBCUTANEOUS at 15:23

## 2019-03-18 RX ADMIN — FOLIC ACID 1 MG: 1 TABLET ORAL at 06:26

## 2019-03-18 RX ADMIN — ACETAMINOPHEN 650 MG: 325 TABLET, FILM COATED ORAL at 19:44

## 2019-03-18 RX ADMIN — HEPARIN SODIUM 5000 UNITS: 5000 INJECTION, SOLUTION INTRAVENOUS; SUBCUTANEOUS at 21:44

## 2019-03-18 RX ADMIN — LOPERAMIDE HYDROCHLORIDE 2 MG: 2 CAPSULE ORAL at 18:00

## 2019-03-18 RX ADMIN — FOMEPIZOLE 860 MG: 1 INJECTION, SOLUTION INTRAVENOUS at 11:16

## 2019-03-18 RX ADMIN — HEPARIN SODIUM 5000 UNITS: 5000 INJECTION, SOLUTION INTRAVENOUS; SUBCUTANEOUS at 06:26

## 2019-03-18 ASSESSMENT — LIFESTYLE VARIABLES
HEADACHE, FULLNESS IN HEAD: NOT PRESENT
TREMOR: NO TREMOR
TOTAL SCORE: 0
PAROXYSMAL SWEATS: NO SWEAT VISIBLE
NAUSEA AND VOMITING: NO NAUSEA AND NO VOMITING
ANXIETY: NO ANXIETY (AT EASE)
VISUAL DISTURBANCES: NOT PRESENT
PAROXYSMAL SWEATS: NO SWEAT VISIBLE
PAROXYSMAL SWEATS: NO SWEAT VISIBLE
ORIENTATION AND CLOUDING OF SENSORIUM: ORIENTED AND CAN DO SERIAL ADDITIONS
AGITATION: NORMAL ACTIVITY
HEADACHE, FULLNESS IN HEAD: NOT PRESENT
TOTAL SCORE: 0
PAROXYSMAL SWEATS: NO SWEAT VISIBLE
HEADACHE, FULLNESS IN HEAD: NOT PRESENT
ANXIETY: NO ANXIETY (AT EASE)
AGITATION: NORMAL ACTIVITY
TOTAL SCORE: 0
ORIENTATION AND CLOUDING OF SENSORIUM: ORIENTED AND CAN DO SERIAL ADDITIONS
HEADACHE, FULLNESS IN HEAD: NOT PRESENT
TOTAL SCORE: 0
AUDITORY DISTURBANCES: NOT PRESENT
AUDITORY DISTURBANCES: NOT PRESENT
NAUSEA AND VOMITING: NO NAUSEA AND NO VOMITING
AUDITORY DISTURBANCES: NOT PRESENT
VISUAL DISTURBANCES: NOT PRESENT
ORIENTATION AND CLOUDING OF SENSORIUM: ORIENTED AND CAN DO SERIAL ADDITIONS
NAUSEA AND VOMITING: NO NAUSEA AND NO VOMITING
ANXIETY: NO ANXIETY (AT EASE)
VISUAL DISTURBANCES: NOT PRESENT
NAUSEA AND VOMITING: NO NAUSEA AND NO VOMITING
AUDITORY DISTURBANCES: NOT PRESENT
TREMOR: NO TREMOR
AGITATION: NORMAL ACTIVITY
ANXIETY: NO ANXIETY (AT EASE)
ORIENTATION AND CLOUDING OF SENSORIUM: ORIENTED AND CAN DO SERIAL ADDITIONS
TREMOR: NO TREMOR
TREMOR: NO TREMOR
VISUAL DISTURBANCES: NOT PRESENT
AGITATION: NORMAL ACTIVITY

## 2019-03-18 ASSESSMENT — ENCOUNTER SYMPTOMS
VOMITING: 0
NAUSEA: 0
BACK PAIN: 0
COUGH: 0
SHORTNESS OF BREATH: 0
NERVOUS/ANXIOUS: 1
HEADACHES: 0
DIZZINESS: 0
ABDOMINAL PAIN: 0
DIARRHEA: 0
FEVER: 0
LOSS OF CONSCIOUSNESS: 0
CHILLS: 0

## 2019-03-18 ASSESSMENT — COGNITIVE AND FUNCTIONAL STATUS - GENERAL
MOBILITY SCORE: 24
DAILY ACTIVITIY SCORE: 24
SUGGESTED CMS G CODE MODIFIER DAILY ACTIVITY: CH
SUGGESTED CMS G CODE MODIFIER MOBILITY: CH

## 2019-03-18 ASSESSMENT — PATIENT HEALTH QUESTIONNAIRE - PHQ9
SUM OF ALL RESPONSES TO PHQ9 QUESTIONS 1 AND 2: 0
1. LITTLE INTEREST OR PLEASURE IN DOING THINGS: NOT AT ALL
2. FEELING DOWN, DEPRESSED, IRRITABLE, OR HOPELESS: NOT AT ALL
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
## 2019-03-18 NOTE — PROGRESS NOTES
Gianna with Poison Control called for patient updates. Updated her on patient. She stated they would follow up in 24 hours.

## 2019-03-18 NOTE — PROGRESS NOTES
Nephrology Daily Progress Note    Date of Service  3/18/2019    Chief Complaint  44 y.o. female admitted 3/16/2019 with ALOC, sever metabolic acidosis, had urgent HD    Interval Problem Update  Pt is more awake today, no new complaints    Review of Systems  Review of Systems   Constitutional: Negative for chills, fever and malaise/fatigue.   Respiratory: Negative for cough and shortness of breath.    Cardiovascular: Negative for chest pain and leg swelling.   Gastrointestinal: Negative for nausea and vomiting.   Genitourinary: Negative for dysuria, frequency and urgency.   Psychiatric/Behavioral: The patient is nervous/anxious.         Physical Exam  Temp:  [36.2 °C (97.1 °F)-37.8 °C (100 °F)] 37.8 °C (100 °F)  Pulse:  [] 101  Resp:  [16-51] 19  SpO2:  [94 %-100 %] 97 %    Physical Exam   Constitutional: No distress.   HENT:   Nose: Nose normal.   Eyes: Conjunctivae are normal. Right eye exhibits no discharge. Left eye exhibits no discharge. No scleral icterus.   Cardiovascular: Normal rate and regular rhythm.    No murmur heard.  Pulmonary/Chest: She has no wheezes. She has no rales.   Musculoskeletal: She exhibits no edema.   Skin: She is not diaphoretic.   Nursing note and vitals reviewed.      Fluids    Intake/Output Summary (Last 24 hours) at 03/18/19 1557  Last data filed at 03/18/19 1200   Gross per 24 hour   Intake          7537.12 ml   Output             3600 ml   Net          3937.12 ml       Laboratory  Recent Labs      03/16/19   2122  03/17/19   0214  03/18/19   0426   WBC  15.6*  13.8*  5.9   RBC  4.10*  3.66*  2.90*   HEMOGLOBIN  14.0  12.4  10.0*   HEMATOCRIT  41.9  36.3*  28.9*   MCV  102.2*  99.2*  97.9*   MCH  34.1*  33.9*  34.1*   MCHC  33.4*  34.2  34.9   RDW  46.2  44.3  42.3   PLATELETCT  240  181  118*   MPV  9.4  9.4  9.7     Recent Labs      03/17/19   2103  03/18/19   0037  03/18/19   0426   SODIUM  137  141  141   POTASSIUM  2.8*  3.3*  3.3*   CHLORIDE  102  106  108   CO2  30  29   28   GLUCOSE  140*  118*  118*   BUN  5*  4*  3*   CREATININE  0.66  0.62  0.60   CALCIUM  7.8*  7.6*  7.4*     Recent Labs      03/16/19 2122   APTT  29.2   INR  1.27*               Imaging    Assessment/Plan  1 sever metabolic acidosis: improved with HD  2 hypokalemia  3 alcohol intoxication   4 Anemia  Plan  no acute need for HD  Daily labs  Renal dose all meds  Avoid nephrotoxins  Prognosis guarded.  If labs are good tomorrow we will remove HD cath

## 2019-03-18 NOTE — PROGRESS NOTES
Spiritual Care Note      Patient Information     Patient's Name: Chula Huerta   MRN: [id number]    YOB: 1975   Age and Gender: 44 y.o. female   Room (and Bed): [place] [address]   Ethnicity or Nationality:     Primary Language: English   Muslim/Spiritual Preference: Nothing in Particular   Place of Residence: [place]   Medical Diagnosis(-es)/Procedure(s): metabolic acidosis due to ethylene glycol  hallucinations  acute alcoholic intoxication without complication   Code Status: Full Code    Date of Admission: 3/16/2019   Length of Stay: 2 days      Spiritual Screen Results:  Is your spiritual health or inner well-being important to you as you cope with your medical condition?:       Yes  Would you like to receive a visit from our Spiritual Care team or your own Sabianist or spiritual leader?:      Yes  Was spiritual care education provided to the patient?:      Yes     Encounter/Request Information  Visited With:      Patient       Nature of the Visit:     Initial, On shift  Continue Visiting:     No  General Visit:      Yes  Referral From/ Origin of Request:   Epic nursing    Religous Needs/Values  Muslim Needs:     Prayer    Spiritual Assessment   Interacton/Conversation:    Patient asked me to pray with her  Assessment:      Need  Need:       Seeking Spiritual Assistance and Support  Interventions:      compassionate presence, emotional support, prayer  Outcomes:      Spiritual Comfort      Spiritual Care Provider Information:  Title of Spiritual Care Provider:    Name of Spiritual Care Provider:   RADHA Lawson  Phone Number:     [phone]   E-Mail:       [email or web address]  Total Time:      10 minutes

## 2019-03-18 NOTE — CARE PLAN
Problem: Safety  Goal: Will remain free from injury  Outcome: PROGRESSING AS EXPECTED    Intervention: Provide assistance with mobility   03/17/19 2200   OTHER   Assistance / Tolerance Standby Assist;Tolerates Well     Intervention: Collaborate with Interdisciplinary Team for safe transfer and mobilization techniques   03/17/19 2200   OTHER   Assistive Devices None       Goal: Will remain free from falls  Outcome: PROGRESSING AS EXPECTED    Intervention: Assess risk factors for falls   03/17/19 2200 03/17/19 2335   OTHER   Fall Risk --  Risk to Fall - 0 - 1 point   Risk for Injury-Any positive answers results in the pt being at high risk for fall related injury --  Not Applicable   Mobility Status Assessment --  0-Ambulates & Transfers Independently. No Assistance Required   Pt Calls for Assistance Yes --      Intervention: Implement fall precautions   03/17/19 2335   OTHER   Environmental Precautions Treaded Slipper Socks on Patient;Personal Belongings, Wastebasket, Call Bell etc. in Easy Reach;Transferred to Stronger Side;Bed in Low Position   IV Pole on Same Side of Bed as Bathroom Yes   Bedrails Bedrails Closest to Bathroom Down   Chair/Bed Strip Alarm Yes - Alarm On   Bed Alarm Yes - Alarm On         Problem: Pain Management  Goal: Pain level will decrease to patient's comfort goal  Outcome: PROGRESSING AS EXPECTED
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
## 2019-03-18 NOTE — PROGRESS NOTES
Notified Dr. Gonda that patient has had small bouts of diarrhea this shift and is requesting something to help. Orders given for PRN imodium. No further orders will continue to monitor.

## 2019-03-18 NOTE — PROGRESS NOTES
· 2 RN skin check not required due to Edgardo Score of 21  · Devices in place:  EKG leads; BP Cuff; pulse oximetry device;  R IJ HD line PIV x 2;SCDs  · Skin assessed under devices: completed  · Confirmed pressure ulcers found on: n/a  · New potential pressure ulcers noted on: n/a  · Wound consult placed and wound reported: not needed at this time. Patient has intermittent bruising throughout body; noted a large bruise on her right forearm on the back side; small bruising on her lower abdomen area from heparin shots.  · The following interventions in place: Q2h turns; repositioning lines, devices, and tubing with turns; bilateral heels and elbows floated on pillows for protection; no new skin issues or changes since last RN skin assessment;will continue to assess

## 2019-03-18 NOTE — PROGRESS NOTES
Pharmacy Note: Poison control updated for possible toxic alcohol overdose    Poison control case #:[id number]    Labs:  Recent Labs      03/16/19 2122 03/17/19 0214 03/17/19 2103 03/18/19   0037  03/18/19   0426  03/18/19   1300   SODIUM  135   < >  135   < >  137  141  141   --    POTASSIUM  4.4   < >  4.7   < >  2.8*  3.3*  3.3*   --    CHLORIDE  103   < >  110   < >  102  106  108   --    CO2  6*   < >  9*   < >  30  29  28   --    BUN  22   < >  15   < >  5*  4*  3*   --    CREATININE  1.24   < >  0.82   < >  0.66  0.62  0.60   --    GLUCOSE  167*   < >  185*   < >  140*  118*  118*   --    CALCIUM  8.0*   < >  7.2*   < >  7.8*  7.6*  7.4*   --    MAGNESIUM   --    --   1.9   --    --    --   1.5   --    PHOSPHORUS   --    --   1.0*   --    --    --   <1.0*  2.2*   ASTSGOT  51*   --    --    --    --    --    --    --    ALTSGPT  52*   --    --    --    --    --    --    --    ALBUMIN  3.9   --    --    --    --    --    --    --    TBILIRUBIN  0.6   --    --    --    --    --    --    --    INR  1.27*   --    --    --    --    --    --    --     < > = values in this interval not displayed.            Levels:  Collected to be sent to [facility]  3/17 0214 Volatiles  In process  3/17 0214 Ethylene glycol In process  3/17 0214 Methanol  In process  3/18 1300 Ethylene glycol In process      Recommended Plan:  Discussed with    1. Reasonable to stop fomepizole and monitor BMP q6h.  2. Monitor for acidosis indicating the presence of toxic alcohol metabolism.  3. IF patient becomes acidotic, restart fomepizole - starting with a loading dose of 15 mg/kg.  4. Monitor for a minimum of 24 hours following last fomepizole dose.   5. Pharmacy recommends to continue sending ethylene alcohol labs q12h until levels return less than 20.     Atul Orr, PharmD
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
## 2019-03-18 NOTE — DIETARY
Nutrition Services:    Poor PO intake on Nutrition Admit Screen. Pt is currently on a regular diet and per chart pt PO >50% x 3 meals documented, also noted to be tolerating diet per rounds.  Pt is eating adequately at this time.    Ht: 165.1 cm, Wt: 92.3 kg via bed scale, BMI 33.86 (Obese Class I).  Consult RD as needed. RD will re-screen weekly.      RD available prn

## 2019-03-18 NOTE — PROGRESS NOTES
Dr. Gonda notified of phos level <1.0; K 3.3; and mag 1.5. Orders to give 4 gm mag sulfate IV and 30 mmol of Kphos IV. No further orders will continue to monitor

## 2019-03-19 LAB
ACETONE SERPL-MCNC: <5 MG/DL
ANION GAP SERPL CALC-SCNC: 4 MMOL/L (ref 0–11.9)
ANION GAP SERPL CALC-SCNC: 6 MMOL/L (ref 0–11.9)
ANION GAP SERPL CALC-SCNC: 7 MMOL/L (ref 0–11.9)
BASOPHILS # BLD AUTO: 0.9 % (ref 0–1.8)
BASOPHILS # BLD: 0.05 K/UL (ref 0–0.12)
BUN SERPL-MCNC: 4 MG/DL (ref 8–22)
BUN SERPL-MCNC: 4 MG/DL (ref 8–22)
BUN SERPL-MCNC: 5 MG/DL (ref 8–22)
CALCIUM SERPL-MCNC: 8.4 MG/DL (ref 8.5–10.5)
CALCIUM SERPL-MCNC: 8.5 MG/DL (ref 8.5–10.5)
CALCIUM SERPL-MCNC: 8.9 MG/DL (ref 8.5–10.5)
CHLORIDE SERPL-SCNC: 108 MMOL/L (ref 96–112)
CHLORIDE SERPL-SCNC: 108 MMOL/L (ref 96–112)
CHLORIDE SERPL-SCNC: 110 MMOL/L (ref 96–112)
CO2 SERPL-SCNC: 26 MMOL/L (ref 20–33)
CO2 SERPL-SCNC: 26 MMOL/L (ref 20–33)
CO2 SERPL-SCNC: 27 MMOL/L (ref 20–33)
CREAT SERPL-MCNC: 0.5 MG/DL (ref 0.5–1.4)
CREAT SERPL-MCNC: 0.52 MG/DL (ref 0.5–1.4)
CREAT SERPL-MCNC: 0.6 MG/DL (ref 0.5–1.4)
EOSINOPHIL # BLD AUTO: 0.22 K/UL (ref 0–0.51)
EOSINOPHIL NFR BLD: 3.8 % (ref 0–6.9)
ERYTHROCYTE [DISTWIDTH] IN BLOOD BY AUTOMATED COUNT: 45.1 FL (ref 35.9–50)
ETHANOL SERPL GC-MCNC: 71 MG/DL
ETHYLENE GLYCOL SERPLBLD-MCNC: <5 MG/DL
GLUCOSE SERPL-MCNC: 106 MG/DL (ref 65–99)
GLUCOSE SERPL-MCNC: 112 MG/DL (ref 65–99)
GLUCOSE SERPL-MCNC: 119 MG/DL (ref 65–99)
HCT VFR BLD AUTO: 30.7 % (ref 37–47)
HGB BLD-MCNC: 10.4 G/DL (ref 12–16)
HIV 1+2 AB+HIV1 P24 AG SERPL QL IA: NON REACTIVE
IMM GRANULOCYTES # BLD AUTO: 0.03 K/UL (ref 0–0.11)
IMM GRANULOCYTES NFR BLD AUTO: 0.5 % (ref 0–0.9)
ISOPROPANOL SERPL-MCNC: <5 MG/DL
LYMPHOCYTES # BLD AUTO: 1.61 K/UL (ref 1–4.8)
LYMPHOCYTES NFR BLD: 28.1 % (ref 22–41)
MAGNESIUM SERPL-MCNC: 1.8 MG/DL (ref 1.5–2.5)
MCH RBC QN AUTO: 33.9 PG (ref 27–33)
MCHC RBC AUTO-ENTMCNC: 33.9 G/DL (ref 33.6–35)
MCV RBC AUTO: 100 FL (ref 81.4–97.8)
METHANOL SERPL-MCNC: <5 MG/DL
MONOCYTES # BLD AUTO: 0.16 K/UL (ref 0–0.85)
MONOCYTES NFR BLD AUTO: 2.8 % (ref 0–13.4)
NEUTROPHILS # BLD AUTO: 3.66 K/UL (ref 2–7.15)
NEUTROPHILS NFR BLD: 63.9 % (ref 44–72)
NRBC # BLD AUTO: 0 K/UL
NRBC BLD-RTO: 0 /100 WBC
PHOSPHATE SERPL-MCNC: 3.4 MG/DL (ref 2.5–4.5)
PLATELET # BLD AUTO: 98 K/UL (ref 164–446)
PMV BLD AUTO: 10.1 FL (ref 9–12.9)
POTASSIUM SERPL-SCNC: 3.4 MMOL/L (ref 3.6–5.5)
POTASSIUM SERPL-SCNC: 3.6 MMOL/L (ref 3.6–5.5)
POTASSIUM SERPL-SCNC: 3.8 MMOL/L (ref 3.6–5.5)
RBC # BLD AUTO: 3.07 M/UL (ref 4.2–5.4)
SODIUM SERPL-SCNC: 140 MMOL/L (ref 135–145)
SODIUM SERPL-SCNC: 141 MMOL/L (ref 135–145)
SODIUM SERPL-SCNC: 141 MMOL/L (ref 135–145)
WBC # BLD AUTO: 5.7 K/UL (ref 4.8–10.8)

## 2019-03-19 PROCEDURE — 90686 IIV4 VACC NO PRSV 0.5 ML IM: CPT | Performed by: INTERNAL MEDICINE

## 2019-03-19 PROCEDURE — 700102 HCHG RX REV CODE 250 W/ 637 OVERRIDE(OP): Performed by: INTERNAL MEDICINE

## 2019-03-19 PROCEDURE — 99232 SBSQ HOSP IP/OBS MODERATE 35: CPT | Performed by: INTERNAL MEDICINE

## 2019-03-19 PROCEDURE — A9270 NON-COVERED ITEM OR SERVICE: HCPCS | Performed by: INTERNAL MEDICINE

## 2019-03-19 PROCEDURE — 82693 ASSAY OF ETHYLENE GLYCOL: CPT

## 2019-03-19 PROCEDURE — 83735 ASSAY OF MAGNESIUM: CPT

## 2019-03-19 PROCEDURE — 87389 HIV-1 AG W/HIV-1&-2 AB AG IA: CPT

## 2019-03-19 PROCEDURE — 3E02340 INTRODUCTION OF INFLUENZA VACCINE INTO MUSCLE, PERCUTANEOUS APPROACH: ICD-10-PCS | Performed by: INTERNAL MEDICINE

## 2019-03-19 PROCEDURE — 99233 SBSQ HOSP IP/OBS HIGH 50: CPT | Performed by: HOSPITALIST

## 2019-03-19 PROCEDURE — 700102 HCHG RX REV CODE 250 W/ 637 OVERRIDE(OP): Performed by: HOSPITALIST

## 2019-03-19 PROCEDURE — 700111 HCHG RX REV CODE 636 W/ 250 OVERRIDE (IP): Performed by: HOSPITALIST

## 2019-03-19 PROCEDURE — 85025 COMPLETE CBC W/AUTO DIFF WBC: CPT

## 2019-03-19 PROCEDURE — 90471 IMMUNIZATION ADMIN: CPT

## 2019-03-19 PROCEDURE — 770006 HCHG ROOM/CARE - MED/SURG/GYN SEMI*

## 2019-03-19 PROCEDURE — 80048 BASIC METABOLIC PNL TOTAL CA: CPT | Mod: 91

## 2019-03-19 PROCEDURE — 700111 HCHG RX REV CODE 636 W/ 250 OVERRIDE (IP): Performed by: INTERNAL MEDICINE

## 2019-03-19 PROCEDURE — 700105 HCHG RX REV CODE 258: Performed by: HOSPITALIST

## 2019-03-19 PROCEDURE — 84100 ASSAY OF PHOSPHORUS: CPT

## 2019-03-19 PROCEDURE — A9270 NON-COVERED ITEM OR SERVICE: HCPCS | Performed by: HOSPITALIST

## 2019-03-19 RX ORDER — POTASSIUM CHLORIDE 14.9 MG/ML
20 INJECTION INTRAVENOUS ONCE
Status: COMPLETED | OUTPATIENT
Start: 2019-03-19 | End: 2019-03-19

## 2019-03-19 RX ORDER — DEXTROSE, SODIUM CHLORIDE, SODIUM LACTATE, POTASSIUM CHLORIDE, AND CALCIUM CHLORIDE 5; .6; .31; .03; .02 G/100ML; G/100ML; G/100ML; G/100ML; G/100ML
INJECTION, SOLUTION INTRAVENOUS CONTINUOUS
Status: DISCONTINUED | OUTPATIENT
Start: 2019-03-19 | End: 2019-03-19

## 2019-03-19 RX ORDER — POTASSIUM CHLORIDE 20 MEQ/1
60 TABLET, EXTENDED RELEASE ORAL ONCE
Status: COMPLETED | OUTPATIENT
Start: 2019-03-19 | End: 2019-03-19

## 2019-03-19 RX ORDER — POTASSIUM CHLORIDE 7.45 MG/ML
10 INJECTION INTRAVENOUS
Status: DISCONTINUED | OUTPATIENT
Start: 2019-03-19 | End: 2019-03-19 | Stop reason: CLARIF

## 2019-03-19 RX ADMIN — INFLUENZA A VIRUS A/MICHIGAN/45/2015 X-275 (H1N1) ANTIGEN (FORMALDEHYDE INACTIVATED), INFLUENZA A VIRUS A/SINGAPORE/INFIMH-16-0019/2016 IVR-186 (H3N2) ANTIGEN (FORMALDEHYDE INACTIVATED), INFLUENZA B VIRUS B/PHUKET/3073/2013 ANTIGEN (FORMALDEHYDE INACTIVATED), AND INFLUENZA B VIRUS B/MARYLAND/15/2016 BX-69A ANTIGEN (FORMALDEHYDE INACTIVATED) 0.5 ML: 15; 15; 15; 15 INJECTION, SUSPENSION INTRAMUSCULAR at 03:30

## 2019-03-19 RX ADMIN — Medication 100 MG: at 08:41

## 2019-03-19 RX ADMIN — SODIUM CHLORIDE, SODIUM LACTATE, POTASSIUM CHLORIDE, CALCIUM CHLORIDE AND DEXTROSE MONOHYDRATE: 5; 600; 310; 30; 20 INJECTION, SOLUTION INTRAVENOUS at 02:59

## 2019-03-19 RX ADMIN — FOLIC ACID 1 MG: 1 TABLET ORAL at 06:40

## 2019-03-19 RX ADMIN — POTASSIUM CHLORIDE 60 MEQ: 1500 TABLET, EXTENDED RELEASE ORAL at 08:41

## 2019-03-19 RX ADMIN — THERA TABS 1 TABLET: TAB at 06:40

## 2019-03-19 RX ADMIN — ACETAMINOPHEN 650 MG: 325 TABLET, FILM COATED ORAL at 03:53

## 2019-03-19 RX ADMIN — POTASSIUM CHLORIDE 20 MEQ: 14.9 INJECTION, SOLUTION INTRAVENOUS at 03:30

## 2019-03-19 RX ADMIN — HEPARIN SODIUM 5000 UNITS: 5000 INJECTION, SOLUTION INTRAVENOUS; SUBCUTANEOUS at 14:41

## 2019-03-19 RX ADMIN — ACETAMINOPHEN 650 MG: 325 TABLET, FILM COATED ORAL at 10:45

## 2019-03-19 RX ADMIN — HEPARIN SODIUM 5000 UNITS: 5000 INJECTION, SOLUTION INTRAVENOUS; SUBCUTANEOUS at 21:57

## 2019-03-19 RX ADMIN — HEPARIN SODIUM 5000 UNITS: 5000 INJECTION, SOLUTION INTRAVENOUS; SUBCUTANEOUS at 06:40

## 2019-03-19 ASSESSMENT — ENCOUNTER SYMPTOMS
FEVER: 0
DIARRHEA: 0
NAUSEA: 0
BACK PAIN: 0
LOSS OF CONSCIOUSNESS: 0
DIZZINESS: 0
CHILLS: 0
HEADACHES: 0
VOMITING: 0
ABDOMINAL PAIN: 0
COUGH: 0
SHORTNESS OF BREATH: 0

## 2019-03-19 ASSESSMENT — LIFESTYLE VARIABLES
AUDITORY DISTURBANCES: NOT PRESENT
ANXIETY: NO ANXIETY (AT EASE)
VISUAL DISTURBANCES: NOT PRESENT
ORIENTATION AND CLOUDING OF SENSORIUM: ORIENTED AND CAN DO SERIAL ADDITIONS
PAROXYSMAL SWEATS: NO SWEAT VISIBLE
HEADACHE, FULLNESS IN HEAD: NOT PRESENT
TOTAL SCORE: 0
ANXIETY: NO ANXIETY (AT EASE)
AGITATION: NORMAL ACTIVITY
VISUAL DISTURBANCES: NOT PRESENT
TREMOR: NO TREMOR
TREMOR: NO TREMOR
AGITATION: NORMAL ACTIVITY
AUDITORY DISTURBANCES: NOT PRESENT
NAUSEA AND VOMITING: NO NAUSEA AND NO VOMITING
TOTAL SCORE: 0
ORIENTATION AND CLOUDING OF SENSORIUM: ORIENTED AND CAN DO SERIAL ADDITIONS
PAROXYSMAL SWEATS: NO SWEAT VISIBLE
NAUSEA AND VOMITING: NO NAUSEA AND NO VOMITING
HEADACHE, FULLNESS IN HEAD: NOT PRESENT

## 2019-03-19 NOTE — CARE PLAN
Problem: Venous Thromboembolism (VTW)/Deep Vein Thrombosis (DVT) Prevention:  Goal: Patient will participate in Venous Thrombosis (VTE)/Deep Vein Thrombosis (DVT)Prevention Measures  Outcome: PROGRESSING AS EXPECTED   03/18/19 2000   OTHER   Risk Assessment Score 1   VTE RISK Moderate   Pharmacologic Prophylaxis Used Unfractionated Heparin   Mechanical/VTE Prophylaxis   Mechanical Prophylaxis  SCDs, Sequential Compression Device   SCDs, Sequential Compression Device Refused  (education provided)     Intervention: Ensure patient wears graduated elastic stockings (JENNIFER hose) and/or SCDs, if ordered, when in bed or chair (Remove at least once per shift for skin check)   03/18/19 2000   Mechanical/VTE Prophylaxis   Mechanical Prophylaxis  SCDs, Sequential Compression Device   SCDs, Sequential Compression Device Refused  (education provided)         Problem: Bowel/Gastric:  Goal: Normal bowel function is maintained or improved  Outcome: PROGRESSING AS EXPECTED   03/18/19 1600 03/18/19 2000   OTHER   Last BM --  03/18/19   Number of Times Stooled 0 --      Goal: Will not experience complications related to bowel motility  Outcome: PROGRESSING AS EXPECTED   03/18/19 1600 03/18/19 2000   OTHER   Last BM --  03/18/19   Number of Times Stooled 0 --      Intervention: Implement Bowel Protocol, if applicable  Patient had small incidences of diarrhea. Took one PRN dose of Loperamide during day shift.

## 2019-03-19 NOTE — DISCHARGE PLANNING
Care Transition Team Assessment  In the case of an emergency, pt's legal NOK is Mother Faviola Christy     RNCM met with pt at bedside and obtained the information used in this assessment. Pt verified accuracy of facesheet, correct address is [place]. Pt lives in a 2 story townhouse alone. . Prior to current hospitalization, pt was completely independent in ADLS/IADLS. Pt drives and is able to attend necessary MD appointments.  Pt has no financial concerns. Pt has a good support system. Pt denies any hx of substance use and denies any dx of mh. Pt plans to discharge to home alone.      Information Source pt  Orientation : Oriented x 4  Information Given By: Patient  Informant's Name:  (Chula)  Who is responsible for making decisions for patient? : Patient         Elopement Risk  Legal Hold: No  Ambulatory or Self Mobile in Wheelchair: Yes  Disoriented: No  Psychiatric Symptoms: None  History of Wandering: No  Elopement this Admit: No  Vocalizing Wanting to Leave: No  Displays Behaviors, Body Language Wanting to Leave: No-Not at Risk for Elopement  Elopement Risk: Not at Risk for Elopement  Picture of Patient on Inside Chart Front Cover: No (See Comments)    Interdisciplinary Discharge Planning  Does Admitting Nurse Feel This Could be a Complex Discharge?: No  Primary Care Physician: none  Lives with - Patient's Self Care Capacity: Alone and Able to Care For Self  Patient or legal guardian wants to designate a caregiver (see row info): No  Support Systems: Family Member(s)  Housing / Facility: 2 Story Apartment / Condo  Do You Take your Prescribed Medications Regularly: No  Mobility Issues: No  Prior Services: None  Patient Expects to be Discharged to::  (home)    Discharge Preparedness  What are your discharge supports?: Child, Parent  Prior Functional Level: Ambulatory, Drives Self, Independent with Activities of Daily Living, Independent with Medication Management  Difficulity with  ADLs: None  Difficulity with IADLs: None    Functional Assesment  Prior Functional Level: Ambulatory, Drives Self, Independent with Activities of Daily Living, Independent with Medication Management    Finances  Financial Barriers to Discharge: No    Vision / Hearing Impairment  Vision Impairment : Yes  Right Eye Vision: Impaired, Wears Glasses  Left Eye Vision: Impaired, Wears Glasses  Hearing Impairment : No              Domestic Abuse  Have you ever been the victim of abuse or violence?: No  Physical Abuse or Sexual Abuse: No  Verbal Abuse or Emotional Abuse: No  Possible Abuse Reported to:: Not Applicable    Psychological Assessment  History of Substance Abuse: None  History of Psychiatric Problems: No         Anticipated Discharge Information  Anticipated discharge disposition: Home

## 2019-03-19 NOTE — PROGRESS NOTES
Gianna with Poison Control Center called for patient updates. Gianna updated on patient. Poison Control is to follow up again in 24 hours.

## 2019-03-19 NOTE — CARE PLAN
Problem: Safety  Goal: Will remain free from falls    Intervention: Assess risk factors for falls  Pt steady on feet .       Problem: Pain Management  Goal: Pain level will decrease to patient's comfort goal    Intervention: Follow pain managment plan developed in collaboration with patient and Interdisciplinary Team  Pt medicated with Tylenol for HA per pt request

## 2019-03-19 NOTE — PROGRESS NOTES
Nephrology Daily Progress Note    Date of Service  3/19/2019    Chief Complaint  44 y.o. female admitted 3/16/2019 with ALOC, sever metabolic acidosis, had urgent HD    Interval Problem Update  Pt is more awake today, no new complaints  3/19 pt is doing better today, no new complaints  Review of Systems  Review of Systems   Constitutional: Negative for chills, fever and malaise/fatigue.   Respiratory: Negative for cough and shortness of breath.    Cardiovascular: Negative for chest pain and leg swelling.   Gastrointestinal: Negative for nausea and vomiting.   Genitourinary: Negative for dysuria, frequency and urgency.        Physical Exam  Temp:  [36.6 °C (97.8 °F)-37.1 °C (98.7 °F)] 36.6 °C (97.8 °F)  Pulse:  [84] 84  Resp:  [12-39] 13  SpO2:  [95 %-98 %] 98 %    Physical Exam   Constitutional: No distress.   HENT:   Nose: Nose normal.   Eyes: Conjunctivae are normal. Right eye exhibits no discharge. Left eye exhibits no discharge. No scleral icterus.   Cardiovascular: Normal rate and regular rhythm.    No murmur heard.  Pulmonary/Chest: She has no wheezes. She has no rales.   Musculoskeletal: She exhibits edema.   Skin: She is not diaphoretic.   Nursing note and vitals reviewed.      Fluids    Intake/Output Summary (Last 24 hours) at 03/19/19 1427  Last data filed at 03/19/19 1100   Gross per 24 hour   Intake           4587.4 ml   Output             6025 ml   Net          -1437.6 ml       Laboratory  Recent Labs      03/17/19   0214  03/18/19   0426  03/19/19   0633   WBC  13.8*  5.9  5.7   RBC  3.66*  2.90*  3.07*   HEMOGLOBIN  12.4  10.0*  10.4*   HEMATOCRIT  36.3*  28.9*  30.7*   MCV  99.2*  97.9*  100.0*   MCH  33.9*  34.1*  33.9*   MCHC  34.2  34.9  33.9   RDW  44.3  42.3  45.1   PLATELETCT  181  118*  98*   MPV  9.4  9.7  10.1     Recent Labs      03/19/19   0015  03/19/19   0633  03/19/19   1215   SODIUM  141  141  140   POTASSIUM  3.4*  3.6  3.8   CHLORIDE  108  110  108   CO2  26  27  26   GLUCOSE  119*   112*  106*   BUN  5*  4*  4*   CREATININE  0.60  0.50  0.52   CALCIUM  8.5  8.4*  8.9     Recent Labs      03/16/19 2122   APTT  29.2   INR  1.27*               Imaging    Assessment/Plan  1 sever metabolic acidosis: improved  2 hypokalemia:resolved  3 alcohol intoxication   4 Anemia  Plan  no need for HD  Remove HD cath  Daily labs  Renal dose all meds  Avoid nephrotoxins  We will sign off the case, please call if needed

## 2019-03-19 NOTE — PROGRESS NOTES
Late entry due to patient care    Dr. Vargas notified that patient has LRD5W running at 150 ml/hr and that patient has voided around 3 L for this shift. Orders to decrease fluids to 75 ml/hr. No further orders will continue to monitor.

## 2019-03-19 NOTE — PROGRESS NOTES
Notified Dr. Vargas of patient K level 3.4. Orders to put in a K scale. No further orders given will continue to monitor.

## 2019-03-19 NOTE — PROGRESS NOTES
[facility] Medicine Daily Progress Note    Date of Service  3/19/2019    Chief Complaint  44 y.o. female admitted 3/16/2019 with altered mental status    Hospital Course    pt admitted altered.  Admitted to heavy vodka intake (1 litre daily) but not sure if she drank anything else.  In ED noted to have a signficant increased gap acidosis with an osmolal gap of 25.  Started on HD for presumed ethylene glycol ingestion and admitted to the ICU      Interval Problem Update  Patient seen and examined today. ICU Care  Care and plan discussed in IDT/Hot rounds.  Lines and assistive devices reviewed.    Patient tolerating treatment and therapies.  All Data, Medication data reviewed.  Case discussed with nursing as available.  Plan of Care reviewed with patient and notified of changes.  3/19 patient feels much better, laboratory data much improved, removing hemodialysis catheter, discontinuing IV fluids and monitoring her electrolytes closely  The patient is somewhat unclear about her exposure and developments prior to admission.  She is unaware of abnormal ingestion of substances other than alcohol, develops a good appetite now, is hydrating  Consultants/Specialty  Nephrology  Pulmonology    Code Status  Full code    Disposition  OK to floor    Review of Systems  Review of Systems   Constitutional: Negative for chills, fever and malaise/fatigue.   Respiratory: Negative for cough and shortness of breath.    Cardiovascular: Negative for chest pain.   Gastrointestinal: Negative for abdominal pain, diarrhea, nausea and vomiting.   Musculoskeletal: Negative for back pain.   Skin: Negative for rash.   Neurological: Negative for dizziness, loss of consciousness and headaches.        Physical Exam  Temp:  [36.6 °C (97.8 °F)-37.8 °C (100 °F)] 36.6 °C (97.8 °F)  Pulse:  [] 84  Resp:  [12-39] 13  SpO2:  [95 %-100 %] 98 %    Physical Exam   Constitutional: She is oriented to person, place, and time. She appears well-developed and  well-nourished. No distress.   HENT:   Head: Normocephalic and atraumatic.   Neck: No JVD present.   Cardiovascular: Normal rate and regular rhythm.    No murmur heard.  Pulmonary/Chest: Effort normal. No stridor. No respiratory distress. She has no wheezes. She has no rales.   Abdominal: Soft. There is no tenderness. There is no rebound and no guarding.   Musculoskeletal: She exhibits no edema.   Neurological: She is oriented to person, place, and time. No cranial nerve deficit.   Skin: Skin is warm and dry. No rash noted. She is not diaphoretic.   Psychiatric: She has a normal mood and affect. Her behavior is normal. Thought content normal.   Nursing note and vitals reviewed.      Fluids    Intake/Output Summary (Last 24 hours) at 03/19/19 0747  Last data filed at 03/19/19 0600   Gross per 24 hour   Intake          7053.85 ml   Output             6975 ml   Net            78.85 ml       Laboratory  Recent Labs      03/17/19   0214  03/18/19   0426  03/19/19   0633   WBC  13.8*  5.9  5.7   RBC  3.66*  2.90*  3.07*   HEMOGLOBIN  12.4  10.0*  10.4*   HEMATOCRIT  36.3*  28.9*  30.7*   MCV  99.2*  97.9*  100.0*   MCH  33.9*  34.1*  33.9*   MCHC  34.2  34.9  33.9   RDW  44.3  42.3  45.1   PLATELETCT  181  118*  98*   MPV  9.4  9.7  10.1     Recent Labs      03/18/19   1300  03/19/19   0015  03/19/19   0633   SODIUM  142  141  141   POTASSIUM  3.4*  3.4*  3.6   CHLORIDE  107  108  110   CO2  27  26  27   GLUCOSE  127*  119*  112*   BUN  3*  5*  4*   CREATININE  0.54  0.60  0.50   CALCIUM  7.8*  8.5  8.4*     Recent Labs      03/16/19 2122   APTT  29.2   INR  1.27*               Imaging  DX-CHEST-LIMITED (1 VIEW)   Final Result      Right IJ central venous catheter placement terminating in the SVC. No pneumothorax.      DX-CHEST-PORTABLE (1 VIEW)   Final Result      No acute cardiopulmonary abnormality.      CT-HEAD W/O   Final Result      No acute intracranial abnormality.           Assessment/Plan  * Metabolic acidosis  due to ethylene glycol   Assessment & Plan    High delta gap and lactic acidosis on presentation  Neprhology consulted: Status post urgent hemodialysis  Fomepizole empirically    Methanol and ethylene glycol levels   Pt denies knowledge of ingestion, unclear what happened, she ? If she was given something.    Consulted [facility] for possible Police evaluation of poisoning     High serum osmolar gap   Assessment & Plan    Ethylene glycol ingestion     Lactic acidosis   Assessment & Plan    resolved     Hallucinations   Assessment & Plan    Visual hallucinations on presentation since resolved  likey due to toxic ingestion     Acute alcoholic intoxication without complication (HCC)   Assessment & Plan    Monitor for alcohol withdrawals   MVI  Thiamine   Folate      Abnormal LFTs   Assessment & Plan    Due to alcohol abuse  hepatits panel neg     Leukocytosis   Assessment & Plan    Suspect reactive, hold on abx for now   IVF         Plan  Patient overall improved  Negative insulin glycol level on admit  Metabolic acidosis resolved  Will observe overnight with stopping IV fluids and allowing regular diet  Possible discharge in the morning  VTE prophylaxis: heparin    I have performed a physical exam and reviewed and updated ROS and Plan today . In review of yesterday's note , there are no changes except as documented above.

## 2019-03-19 NOTE — PROGRESS NOTES
OK to transfer out of ICU.  [facility] will sign off.  Please call if you have any questions.    Karlos Aguirre MD  Pulmonary and Critical Care Medicine

## 2019-03-20 VITALS
SYSTOLIC BLOOD PRESSURE: 136 MMHG | WEIGHT: 201.5 LBS | OXYGEN SATURATION: 99 % | TEMPERATURE: 97.6 F | HEART RATE: 104 BPM | BODY MASS INDEX: 33.57 KG/M2 | DIASTOLIC BLOOD PRESSURE: 82 MMHG | RESPIRATION RATE: 17 BRPM | HEIGHT: 65 IN

## 2019-03-20 LAB
ALBUMIN SERPL BCP-MCNC: 3.4 G/DL (ref 3.2–4.9)
ALBUMIN SERPL BCP-MCNC: 3.4 G/DL (ref 3.2–4.9)
ALBUMIN/GLOB SERPL: 1.2 G/DL
ALBUMIN/GLOB SERPL: 1.3 G/DL
ALP SERPL-CCNC: 152 U/L (ref 30–99)
ALP SERPL-CCNC: 154 U/L (ref 30–99)
ALT SERPL-CCNC: 179 U/L (ref 2–50)
ALT SERPL-CCNC: 194 U/L (ref 2–50)
ANION GAP SERPL CALC-SCNC: 7 MMOL/L (ref 0–11.9)
ANION GAP SERPL CALC-SCNC: 9 MMOL/L (ref 0–11.9)
AST SERPL-CCNC: 113 U/L (ref 12–45)
AST SERPL-CCNC: 99 U/L (ref 12–45)
BASOPHILS # BLD AUTO: 1.2 % (ref 0–1.8)
BASOPHILS # BLD AUTO: 1.3 % (ref 0–1.8)
BASOPHILS # BLD: 0.07 K/UL (ref 0–0.12)
BASOPHILS # BLD: 0.08 K/UL (ref 0–0.12)
BILIRUB SERPL-MCNC: 0.5 MG/DL (ref 0.1–1.5)
BILIRUB SERPL-MCNC: 0.6 MG/DL (ref 0.1–1.5)
BUN SERPL-MCNC: 10 MG/DL (ref 8–22)
BUN SERPL-MCNC: 12 MG/DL (ref 8–22)
CALCIUM SERPL-MCNC: 8.6 MG/DL (ref 8.5–10.5)
CALCIUM SERPL-MCNC: 8.7 MG/DL (ref 8.5–10.5)
CHLORIDE SERPL-SCNC: 105 MMOL/L (ref 96–112)
CHLORIDE SERPL-SCNC: 105 MMOL/L (ref 96–112)
CO2 SERPL-SCNC: 23 MMOL/L (ref 20–33)
CO2 SERPL-SCNC: 24 MMOL/L (ref 20–33)
CREAT SERPL-MCNC: 0.56 MG/DL (ref 0.5–1.4)
CREAT SERPL-MCNC: 0.59 MG/DL (ref 0.5–1.4)
EOSINOPHIL # BLD AUTO: 0.35 K/UL (ref 0–0.51)
EOSINOPHIL # BLD AUTO: 0.4 K/UL (ref 0–0.51)
EOSINOPHIL NFR BLD: 5.9 % (ref 0–6.9)
EOSINOPHIL NFR BLD: 6.4 % (ref 0–6.9)
ERYTHROCYTE [DISTWIDTH] IN BLOOD BY AUTOMATED COUNT: 45 FL (ref 35.9–50)
ERYTHROCYTE [DISTWIDTH] IN BLOOD BY AUTOMATED COUNT: 45.5 FL (ref 35.9–50)
ETHYLENE GLYCOL SERPLBLD-MCNC: <5 MG/DL
GLOBULIN SER CALC-MCNC: 2.7 G/DL (ref 1.9–3.5)
GLOBULIN SER CALC-MCNC: 2.9 G/DL (ref 1.9–3.5)
GLUCOSE SERPL-MCNC: 101 MG/DL (ref 65–99)
GLUCOSE SERPL-MCNC: 196 MG/DL (ref 65–99)
HCT VFR BLD AUTO: 34.8 % (ref 37–47)
HCT VFR BLD AUTO: 35.9 % (ref 37–47)
HGB BLD-MCNC: 12 G/DL (ref 12–16)
HGB BLD-MCNC: 12.1 G/DL (ref 12–16)
IMM GRANULOCYTES # BLD AUTO: 0.06 K/UL (ref 0–0.11)
IMM GRANULOCYTES # BLD AUTO: 0.06 K/UL (ref 0–0.11)
IMM GRANULOCYTES NFR BLD AUTO: 1 % (ref 0–0.9)
IMM GRANULOCYTES NFR BLD AUTO: 1 % (ref 0–0.9)
LYMPHOCYTES # BLD AUTO: 1.84 K/UL (ref 1–4.8)
LYMPHOCYTES # BLD AUTO: 2.01 K/UL (ref 1–4.8)
LYMPHOCYTES NFR BLD: 30.9 % (ref 22–41)
LYMPHOCYTES NFR BLD: 32 % (ref 22–41)
MAGNESIUM SERPL-MCNC: 1.8 MG/DL (ref 1.5–2.5)
MAGNESIUM SERPL-MCNC: 1.8 MG/DL (ref 1.5–2.5)
MCH RBC QN AUTO: 33.7 PG (ref 27–33)
MCH RBC QN AUTO: 34.6 PG (ref 27–33)
MCHC RBC AUTO-ENTMCNC: 33.4 G/DL (ref 33.6–35)
MCHC RBC AUTO-ENTMCNC: 34.8 G/DL (ref 33.6–35)
MCV RBC AUTO: 100.8 FL (ref 81.4–97.8)
MCV RBC AUTO: 99.4 FL (ref 81.4–97.8)
MONOCYTES # BLD AUTO: 0.31 K/UL (ref 0–0.85)
MONOCYTES # BLD AUTO: 0.32 K/UL (ref 0–0.85)
MONOCYTES NFR BLD AUTO: 4.9 % (ref 0–13.4)
MONOCYTES NFR BLD AUTO: 5.4 % (ref 0–13.4)
NEUTROPHILS # BLD AUTO: 3.32 K/UL (ref 2–7.15)
NEUTROPHILS # BLD AUTO: 3.43 K/UL (ref 2–7.15)
NEUTROPHILS NFR BLD: 54.4 % (ref 44–72)
NEUTROPHILS NFR BLD: 55.6 % (ref 44–72)
NRBC # BLD AUTO: 0 K/UL
NRBC # BLD AUTO: 0.02 K/UL
NRBC BLD-RTO: 0 /100 WBC
NRBC BLD-RTO: 0.3 /100 WBC
PHOSPHATE SERPL-MCNC: 3.5 MG/DL (ref 2.5–4.5)
PHOSPHATE SERPL-MCNC: 4 MG/DL (ref 2.5–4.5)
PLATELET # BLD AUTO: 121 K/UL (ref 164–446)
PLATELET # BLD AUTO: 128 K/UL (ref 164–446)
PMV BLD AUTO: 10.7 FL (ref 9–12.9)
PMV BLD AUTO: 11.2 FL (ref 9–12.9)
POTASSIUM SERPL-SCNC: 3.3 MMOL/L (ref 3.6–5.5)
POTASSIUM SERPL-SCNC: 3.7 MMOL/L (ref 3.6–5.5)
PROT SERPL-MCNC: 6.1 G/DL (ref 6–8.2)
PROT SERPL-MCNC: 6.3 G/DL (ref 6–8.2)
RBC # BLD AUTO: 3.5 M/UL (ref 4.2–5.4)
RBC # BLD AUTO: 3.56 M/UL (ref 4.2–5.4)
SODIUM SERPL-SCNC: 136 MMOL/L (ref 135–145)
SODIUM SERPL-SCNC: 137 MMOL/L (ref 135–145)
WBC # BLD AUTO: 6 K/UL (ref 4.8–10.8)
WBC # BLD AUTO: 6.3 K/UL (ref 4.8–10.8)

## 2019-03-20 PROCEDURE — 80053 COMPREHEN METABOLIC PANEL: CPT

## 2019-03-20 PROCEDURE — 84100 ASSAY OF PHOSPHORUS: CPT

## 2019-03-20 PROCEDURE — A9270 NON-COVERED ITEM OR SERVICE: HCPCS | Performed by: INTERNAL MEDICINE

## 2019-03-20 PROCEDURE — 700111 HCHG RX REV CODE 636 W/ 250 OVERRIDE (IP): Performed by: HOSPITALIST

## 2019-03-20 PROCEDURE — A9270 NON-COVERED ITEM OR SERVICE: HCPCS | Performed by: HOSPITALIST

## 2019-03-20 PROCEDURE — 700102 HCHG RX REV CODE 250 W/ 637 OVERRIDE(OP): Performed by: HOSPITALIST

## 2019-03-20 PROCEDURE — 700102 HCHG RX REV CODE 250 W/ 637 OVERRIDE(OP): Performed by: INTERNAL MEDICINE

## 2019-03-20 PROCEDURE — 83735 ASSAY OF MAGNESIUM: CPT

## 2019-03-20 PROCEDURE — 85025 COMPLETE CBC W/AUTO DIFF WBC: CPT

## 2019-03-20 PROCEDURE — 36415 COLL VENOUS BLD VENIPUNCTURE: CPT

## 2019-03-20 PROCEDURE — 99239 HOSP IP/OBS DSCHRG MGMT >30: CPT | Performed by: INTERNAL MEDICINE

## 2019-03-20 RX ORDER — POTASSIUM CHLORIDE 20 MEQ/1
40 TABLET, EXTENDED RELEASE ORAL 2 TIMES DAILY
Status: DISCONTINUED | OUTPATIENT
Start: 2019-03-20 | End: 2019-03-20 | Stop reason: HOSPADM

## 2019-03-20 RX ORDER — POTASSIUM CHLORIDE 750 MG/1
20 TABLET, EXTENDED RELEASE ORAL DAILY
Qty: 6 EACH | Refills: 0 | Status: SHIPPED | OUTPATIENT
Start: 2019-03-20 | End: 2019-03-23

## 2019-03-20 RX ADMIN — LORAZEPAM 1 MG: 0.5 TABLET ORAL at 08:50

## 2019-03-20 RX ADMIN — POTASSIUM CHLORIDE 40 MEQ: 1500 TABLET, EXTENDED RELEASE ORAL at 09:00

## 2019-03-20 RX ADMIN — ACETAMINOPHEN 650 MG: 325 TABLET, FILM COATED ORAL at 01:33

## 2019-03-20 RX ADMIN — THERA TABS 1 TABLET: TAB at 06:13

## 2019-03-20 RX ADMIN — HEPARIN SODIUM 5000 UNITS: 5000 INJECTION, SOLUTION INTRAVENOUS; SUBCUTANEOUS at 06:13

## 2019-03-20 RX ADMIN — Medication 100 MG: at 06:13

## 2019-03-20 RX ADMIN — MAGNESIUM OXIDE TAB 400 MG (241.3 MG ELEMENTAL MG) 400 MG: 400 (241.3 MG) TAB at 09:00

## 2019-03-20 RX ADMIN — FOLIC ACID 1 MG: 1 TABLET ORAL at 06:13

## 2019-03-20 ASSESSMENT — LIFESTYLE VARIABLES
ORIENTATION AND CLOUDING OF SENSORIUM: ORIENTED AND CAN DO SERIAL ADDITIONS
AUDITORY DISTURBANCES: NOT PRESENT
TREMOR: NO TREMOR
PAROXYSMAL SWEATS: NO SWEAT VISIBLE
VISUAL DISTURBANCES: NOT PRESENT
HEADACHE, FULLNESS IN HEAD: NOT PRESENT
TOTAL SCORE: 9
AGITATION: MODERATELY FIDGETY AND RESTLESS
ANXIETY: *
NAUSEA AND VOMITING: NO NAUSEA AND NO VOMITING

## 2019-03-20 NOTE — PROGRESS NOTES
Pt requested to speak to the MD about leaving. She was getting very anxious about going home. Ativan provided for this per pt request. Pt was cleared for discharge. IV removed. AVS and education provided and reviewed. All questions answered. Pt ambulated in stable condition off the unit at 10:52AM.

## 2019-03-20 NOTE — PROGRESS NOTES
PLEASE call Poison Control when lab results are back [ expect results to be complete 3/20 ] for ethylene glycol level and serum methanol level. The number is [phone], refer to pts case number [id number]

## 2019-03-20 NOTE — PROGRESS NOTES
Pt arrived to the unit with the transporter via WC.   Pt is AO x 4  On RA  2 RN skin check done with Selin THURMAN. Pt has a bruise to side of left knee. Skin intact otherwise.     Safety precautions in place. Call light within reach. Bed in low and locked position. Hourly rounding in place. Updated on plan of care. Questions answered.

## 2019-03-20 NOTE — DISCHARGE INSTRUCTIONS
Discharge Instructions    Discharged to home by car with relative. Discharged via walking, hospital escort: Refused.  Special equipment needed: Not Applicable    Be sure to schedule a follow-up appointment with your primary care doctor or any specialists as instructed.     Discharge Plan:   Influenza Vaccine Indication: Indicated: 9 to 64 years of age  Influenza Vaccine Given - only chart on this line when given: Influenza Vaccine Given (See MAR)    I understand that a diet low in cholesterol, fat, and sodium is recommended for good health. Unless I have been given specific instructions below for another diet, I accept this instruction as my diet prescription.   Other diet: Reg    Special Instructions: None    · Is patient discharged on Warfarin / Coumadin?   No     Depression / Suicide Risk    As you are discharged from this [facility] facility, it is important to learn how to keep safe from harming yourself.    Recognize the warning signs:  · Abrupt changes in personality, positive or negative- including increase in energy   · Giving away possessions  · Change in eating patterns- significant weight changes-  positive or negative  · Change in sleeping patterns- unable to sleep or sleeping all the time   · Unwillingness or inability to communicate  · Depression  · Unusual sadness, discouragement and loneliness  · Talk of wanting to die  · Neglect of personal appearance   · Rebelliousness- reckless behavior  · Withdrawal from people/activities they love  · Confusion- inability to concentrate     If you or a loved one observes any of these behaviors or has concerns about self-harm, here's what you can do:  · Talk about it- your feelings and reasons for harming yourself  · Remove any means that you might use to hurt yourself (examples: pills, rope, extension cords, firearm)  · Get professional help from the community (Mental Health, Substance Abuse, psychological counseling)  · Do not be alone:Call your Safe  Contact- someone whom you trust who will be there for you.  · Call your local CRISIS HOTLINE 680-3025 or 312-244-0228  · Call your local Children's Mobile Crisis Response Team [place] (255) 414-3783 or www.[place]  · Call the toll free National Suicide Prevention Hotlines   · National Suicide Prevention Lifeline 222-103-DPSV (4367)  · National Hope Line Network 800-SUICIDE (284-4573)      Alcohol Intoxication  Alcohol intoxication occurs when a person no longer thinks clearly or functions well (becomes impaired) after drinking alcohol. Intoxication can occur with even one drink. The level of impairment depends on:  · The amount of alcohol the person had.  · The person's age, gender, and weight.  · How often the person drinks.  · Whether the person has other medical conditions, such as diabetes, seizures, or a heart condition.  Alcohol intoxication can range in severity from mild to severe. The condition can be dangerous, especially when caused by drinking large amounts of alcohol in a short period of time (binge drinking) or if the person also took certain prescription medicines or recreational drugs.  What are the signs or symptoms?  Symptoms of mild alcohol intoxication include:  · Feeling relaxed or sleepy.  · Mild difficulty with:  ¨ Coordination.  ¨ Speech.  ¨ Memory.  ¨ Attention.  Symptoms of moderate alcohol intoxication include:  · Extreme emotions, such as anger or sadness.  · Moderate difficulty with:  ¨ Coordination.  ¨ Speech.  ¨ Memory.  ¨ Attention.  Symptoms of severe alcohol intoxication include:  · Passing out.  · Vomiting.  · Confusion.  · Slow breathing.  · Coma.  · Severe difficulty with:  ¨ Coordination.  ¨ Speech.  ¨ Memory.  ¨ Attention.  Intoxication, especially in people who are not exposed to alcohol often can progress from mild to severe quickly, and may even cause coma or death.  How is this diagnosed?  This condition may be diagnosed based on:  · A medical history.  · A  physical exam.  · A blood test that measures the concentration of alcohol in the blood (blood alcohol content, or PAPA).  · Whether there is a smell of alcohol on the breath.  Your health care provider will ask you how much alcohol you drank and what kind of alcohol you had.  How is this treated?  Usually, treatment is not needed for this condition. Most of the effects of alcohol are temporary and go away as the alcohol naturally leaves the body. Your health care provider may recommend monitoring until the alcohol level starts to drop and it is safe to go home. You may also get fluids through an IV tube to help prevent dehydration. If the intoxication is severe, a breathing machine called a ventilator may be needed to support your breathing,  Follow these instructions at home:  · Do not drive after drinking alcohol.  · Have someone stay with you while you are intoxicated. You should not be left alone.  · Stay hydrated. Drink enough fluid to keep your urine clear or pale yellow.  · Avoid caffeine because it can dehydrate you.  · Take over-the-counter and prescription medicines only as told by your health care provider.  How is this prevented?  To prevent alcohol intoxication:  · Limit alcohol intake to no more than 1 drink a day for nonpregnant women and 2 drinks a day for men. One drink equals 12 oz of beer, 5 oz of wine, or 1½ oz of hard liquor.  · Do not drink alcohol on an empty stomach.  · Avoid drinking alcohol if:  ¨ You are under the legal drinking age.  ¨ You are pregnant or may be pregnant.  ¨ You are taking medicines that should not be taken with alcohol.  ¨ Your drinking causes your medical condition to get worse.  ¨ You need to drive or perform activities that require your attention.  ¨ You have substance use disorder.  To prevent potentially serious complications of alcohol intoxication, seek immediate medical care if you or someone you know has signs of moderate or severe alcohol intoxication. These  include:  · Moderate or severe difficulty with:  ¨ Coordination.  ¨ Speech.  ¨ Memory.  ¨ Attention.  · Passing out.  · Confusion.  · Vomiting.  Do not leave someone alone if he or she is intoxicated.  Contact a health care provider if:  · You do not feel better after a few days.  · You are having problems at work, at school, or at home due to drinking.  Get help right away if:  · You become shaky when you try to stop drinking.  · You shake uncontrollably (have a seizure).  · You vomit blood. Blood in vomit may look bright red, or it may look like coffee grounds.  · You have blood in your stool. Blood in stool may be bright red, or it may make stool appear black and tarry and make it smell bad.  · You become light-headed or you faint.  If you ever feel like you may hurt yourself or others, or have thoughts about taking your own life, get help right away. You can go to your nearest emergency department or call:  · Your local emergency services (911 in the [place]  · A suicide crisis helpline, such as the National Suicide Prevention Lifeline at 1-517.851.7428. This is open 24 hours a day.  This information is not intended to replace advice given to you by your health care provider. Make sure you discuss any questions you have with your health care provider.  Document Released: 09/27/2006 Document Revised: 09/02/2017 Document Reviewed: 09/02/2017  Elsevier Interactive Patient Education © 2017 [employer]

## 2019-03-20 NOTE — CARE PLAN
Problem: Safety  Goal: Will remain free from injury  Outcome: MET Date Met: 03/20/19      Problem: Venous Thromboembolism (VTW)/Deep Vein Thrombosis (DVT) Prevention:  Goal: Patient will participate in Venous Thrombosis (VTE)/Deep Vein Thrombosis (DVT)Prevention Measures  Outcome: MET Date Met: 03/20/19

## 2019-03-20 NOTE — DISCHARGE SUMMARY
Discharge Summary    CHIEF COMPLAINT ON ADMISSION  Chief Complaint   Patient presents with   • Hallucinations     Pt bib ems from home. Police were called to scene due to pt reporting break in at home. Pt was noted to be hallucinating and seeing person at scene who was not there. Pt endorses drinking vodka alot last night, but not today. Per pt she just started drinking again today.    • Tachycardia       Reason for Admission  Hallucination and tachycardia    Admission Date  3/16/2019    CODE STATUS  Full Code    HPI & HOSPITAL COURSE  This is a 44 y.o. female here with complaints of hallucination.  Patient was sent by EMS.  Patient was noticed to have severe metabolic acidosis.  Nephrologist was consulted and suspecting patient might have acute alcohol intoxication versus ethylene glycol ingestion.  Patient was admitted to ICU.  Patient was seen by nephrology.  Patient received dialysis.  Patient was also received supportive care.  Patient's condition significantly improved after dialysis and patient's electrolytes was also replaced.  Patient showed mild hypokalemia today and will be replaced as well.  Patient currently stable and wants to be discharged home.  Patient is educated to avoid drinking alcohol and other material.  Patient agreed.  Patient denies suicidal or homicidal ideation.       Therefore, she is discharged in good and stable condition to home with close outpatient follow-up.    The patient met 2-midnight criteria for an inpatient stay at the time of discharge.    Discharge Date  3/20/2019    FOLLOW UP ITEMS POST DISCHARGE  none    DISCHARGE DIAGNOSES      Metabolic acidosis due to ethylene glycol POA: Yes    Lactic acidosis POA: Yes    High serum osmolar gap POA: Yes    Acute alcoholic intoxication without complication (HCC) POA: Yes    Hallucinations POA: Yes    Leukocytosis POA: Yes    Abnormal LFTs POA: Yes      FOLLOW UP  Future Appointments  Date Time Provider Department Center   5/8/2019 9:00  IGNACIA Beard M.D. [facility] None     No follow-up provider specified.    MEDICATIONS ON DISCHARGE     Medication List      START taking these medications      Instructions   magnesium oxide 400 (241.3 Mg) MG Tabs tablet  Commonly known as:  MAG-OX   Take 1 Tab by mouth every day.  Dose:  400 mg     potassium chloride SA 10 MEQ Tbcr  Commonly known as:  K-DUR   Take 2 Tabs by mouth every day for 3 days.  Dose:  20 mEq        STOP taking these medications    acetaminophen 500 MG Tabs  Commonly known as:  TYLENOL            Allergies  No Known Allergies    DIET  Orders Placed This Encounter   Procedures   • Diet Order Regular     Standing Status:   Standing     Number of Occurrences:   1     Order Specific Question:   Diet:     Answer:   Regular [1]       ACTIVITY  As tolerated.  Weight bearing as tolerated    CONSULTATIONS  Nephro  Pulmonology    PROCEDURES  Dialysis    DX-CHEST-LIMITED (1 VIEW)   Final Result      Right IJ central venous catheter placement terminating in the SVC. No pneumothorax.      DX-CHEST-PORTABLE (1 VIEW)   Final Result      No acute cardiopulmonary abnormality.      CT-HEAD W/O   Final Result      No acute intracranial abnormality.          PE:  Gen: AAOx3, NAD  Eyes: PELLA  Neck: no JVD, no lymphadenopathy  Cardia: RRR, no mrg  Lungs: CTAB, no rales, rhonci or wheezing  Abd: NABS, soft, non extended, no mass  EXT: No C/C/E, peripheral pulse 2+ b/l  Neuro: CN II-XII intact, non focal, reflex 2+ symmetrical  Skin: Intact, no lesion, warm  Psych: Appropriate.      LABORATORY  Lab Results   Component Value Date    SODIUM 137 03/20/2019    POTASSIUM 3.3 (L) 03/20/2019    CHLORIDE 105 03/20/2019    CO2 23 03/20/2019    GLUCOSE 196 (H) 03/20/2019    BUN 12 03/20/2019    CREATININE 0.59 03/20/2019        Lab Results   Component Value Date    WBC 6.0 03/20/2019    HEMOGLOBIN 12.0 03/20/2019    HEMATOCRIT 35.9 (L) 03/20/2019    PLATELETCT 128 (L) 03/20/2019        Total time of the discharge process  exceeds 38 minutes.

## 2019-03-21 LAB
ETHYLENE GLYCOL SERPLBLD-MCNC: <5 MG/DL
ETHYLENE GLYCOL SERPLBLD-MCNC: <5 MG/DL

## 2019-03-22 LAB
BACTERIA BLD CULT: NORMAL
BACTERIA BLD CULT: NORMAL
SIGNIFICANT IND 70042: NORMAL
SIGNIFICANT IND 70042: NORMAL
SITE SITE: NORMAL
SITE SITE: NORMAL
SOURCE SOURCE: NORMAL
SOURCE SOURCE: NORMAL

## 2019-04-25 ENCOUNTER — HOSPITAL ENCOUNTER (INPATIENT)
Facility: MEDICAL CENTER | Age: 44
LOS: 2 days | DRG: 641 | End: 2019-04-27
Attending: EMERGENCY MEDICINE | Admitting: HOSPITALIST
Payer: MEDICAID

## 2019-04-25 ENCOUNTER — APPOINTMENT (OUTPATIENT)
Dept: RADIOLOGY | Facility: MEDICAL CENTER | Age: 44
DRG: 641 | End: 2019-04-25
Attending: INTERNAL MEDICINE
Payer: MEDICAID

## 2019-04-25 ENCOUNTER — APPOINTMENT (OUTPATIENT)
Dept: RADIOLOGY | Facility: MEDICAL CENTER | Age: 44
DRG: 641 | End: 2019-04-25
Attending: EMERGENCY MEDICINE
Payer: MEDICAID

## 2019-04-25 DIAGNOSIS — R74.8 HIGH SERUM OSMOLAR GAP: ICD-10-CM

## 2019-04-25 DIAGNOSIS — R00.0 TACHYCARDIA: ICD-10-CM

## 2019-04-25 DIAGNOSIS — E87.20 ACIDOSIS: ICD-10-CM

## 2019-04-25 PROBLEM — F10.929 ACUTE ALCOHOLIC INTOXICATION WITH COMPLICATION (HCC): Status: ACTIVE | Noted: 2019-04-25

## 2019-04-25 PROBLEM — R65.10 SIRS (SYSTEMIC INFLAMMATORY RESPONSE SYNDROME) (HCC): Status: ACTIVE | Noted: 2019-04-25

## 2019-04-25 PROBLEM — F10.920 ACUTE ALCOHOLIC INTOXICATION WITHOUT COMPLICATION (HCC): Status: RESOLVED | Noted: 2019-03-17 | Resolved: 2019-04-25

## 2019-04-25 LAB
ALBUMIN SERPL BCP-MCNC: 4.8 G/DL (ref 3.2–4.9)
ALBUMIN/GLOB SERPL: 1.1 G/DL
ALP SERPL-CCNC: 115 U/L (ref 30–99)
ALT SERPL-CCNC: 39 U/L (ref 2–50)
AMPHET UR QL SCN: NEGATIVE
ANION GAP SERPL CALC-SCNC: 11 MMOL/L (ref 0–11.9)
ANION GAP SERPL CALC-SCNC: 25 MMOL/L (ref 0–11.9)
APAP SERPL-MCNC: <10 UG/ML (ref 10–30)
APPEARANCE UR: CLEAR
AST SERPL-CCNC: 31 U/L (ref 12–45)
B-OH-BUTYR SERPL-MCNC: 2.12 MMOL/L (ref 0.02–0.27)
BACTERIA #/AREA URNS HPF: ABNORMAL /HPF
BARBITURATES UR QL SCN: NEGATIVE
BASE EXCESS BLDA CALC-SCNC: -17 MMOL/L (ref -4–3)
BASOPHILS # BLD AUTO: 0.4 % (ref 0–1.8)
BASOPHILS # BLD: 0.07 K/UL (ref 0–0.12)
BENZODIAZ UR QL SCN: NEGATIVE
BILIRUB SERPL-MCNC: 0.7 MG/DL (ref 0.1–1.5)
BILIRUB UR QL STRIP.AUTO: NEGATIVE
BODY TEMPERATURE: ABNORMAL CENTIGRADE
BUN SERPL-MCNC: 16 MG/DL (ref 8–22)
BUN SERPL-MCNC: 8 MG/DL (ref 8–22)
BZE UR QL SCN: NEGATIVE
CALCIUM SERPL-MCNC: 8.7 MG/DL (ref 8.5–10.5)
CALCIUM SERPL-MCNC: 9.3 MG/DL (ref 8.5–10.5)
CANNABINOIDS UR QL SCN: NEGATIVE
CHLORIDE SERPL-SCNC: 102 MMOL/L (ref 96–112)
CHLORIDE SERPL-SCNC: 97 MMOL/L (ref 96–112)
CO2 SERPL-SCNC: 27 MMOL/L (ref 20–33)
CO2 SERPL-SCNC: 6 MMOL/L (ref 20–33)
COLOR UR: YELLOW
CREAT SERPL-MCNC: 0.53 MG/DL (ref 0.5–1.4)
CREAT SERPL-MCNC: 0.93 MG/DL (ref 0.5–1.4)
EKG IMPRESSION: NORMAL
EOSINOPHIL # BLD AUTO: 0 K/UL (ref 0–0.51)
EOSINOPHIL NFR BLD: 0 % (ref 0–6.9)
EPI CELLS #/AREA URNS HPF: ABNORMAL /HPF
ERYTHROCYTE [DISTWIDTH] IN BLOOD BY AUTOMATED COUNT: 50.5 FL (ref 35.9–50)
ETHANOL BLD-MCNC: 0.03 G/DL
GLOBULIN SER CALC-MCNC: 4.2 G/DL (ref 1.9–3.5)
GLUCOSE SERPL-MCNC: 133 MG/DL (ref 65–99)
GLUCOSE SERPL-MCNC: 167 MG/DL (ref 65–99)
GLUCOSE UR STRIP.AUTO-MCNC: NEGATIVE MG/DL
HCG SERPL QL: NEGATIVE
HCO3 BLDA-SCNC: 8 MMOL/L (ref 17–25)
HCT VFR BLD AUTO: 46.5 % (ref 37–47)
HGB BLD-MCNC: 15.4 G/DL (ref 12–16)
HYALINE CASTS #/AREA URNS LPF: ABNORMAL /LPF
IMM GRANULOCYTES # BLD AUTO: 0.11 K/UL (ref 0–0.11)
IMM GRANULOCYTES NFR BLD AUTO: 0.6 % (ref 0–0.9)
KETONES UR STRIP.AUTO-MCNC: 80 MG/DL
LACTATE BLD-SCNC: 1.4 MMOL/L (ref 0.5–2)
LACTATE BLD-SCNC: 5 MMOL/L (ref 0.5–2)
LEUKOCYTE ESTERASE UR QL STRIP.AUTO: NEGATIVE
LYMPHOCYTES # BLD AUTO: 0.81 K/UL (ref 1–4.8)
LYMPHOCYTES NFR BLD: 4.5 % (ref 22–41)
MCH RBC QN AUTO: 33.7 PG (ref 27–33)
MCHC RBC AUTO-ENTMCNC: 33.1 G/DL (ref 33.6–35)
MCV RBC AUTO: 101.8 FL (ref 81.4–97.8)
METHADONE UR QL SCN: NEGATIVE
MICRO URNS: ABNORMAL
MONOCYTES # BLD AUTO: 1.18 K/UL (ref 0–0.85)
MONOCYTES NFR BLD AUTO: 6.6 % (ref 0–13.4)
NEUTROPHILS # BLD AUTO: 15.7 K/UL (ref 2–7.15)
NEUTROPHILS NFR BLD: 87.9 % (ref 44–72)
NITRITE UR QL STRIP.AUTO: NEGATIVE
NRBC # BLD AUTO: 0 K/UL
NRBC BLD-RTO: 0 /100 WBC
OPIATES UR QL SCN: NEGATIVE
OSMOLALITY SERPL: 316 MOSM/KG H2O (ref 278–298)
OXYCODONE UR QL SCN: NEGATIVE
PCO2 BLDA: 18.2 MMHG (ref 26–37)
PCP UR QL SCN: NEGATIVE
PH BLDA: 7.25 [PH] (ref 7.4–7.5)
PH UR STRIP.AUTO: 5 [PH]
PLATELET # BLD AUTO: 355 K/UL (ref 164–446)
PMV BLD AUTO: 9.1 FL (ref 9–12.9)
PO2 BLDA: 90.4 MMHG (ref 64–87)
POTASSIUM SERPL-SCNC: 3.2 MMOL/L (ref 3.6–5.5)
POTASSIUM SERPL-SCNC: 4.5 MMOL/L (ref 3.6–5.5)
PROPOXYPH UR QL SCN: NEGATIVE
PROT SERPL-MCNC: 9 G/DL (ref 6–8.2)
PROT UR QL STRIP: 100 MG/DL
RBC # BLD AUTO: 4.57 M/UL (ref 4.2–5.4)
RBC # URNS HPF: ABNORMAL /HPF
RBC UR QL AUTO: ABNORMAL
SALICYLATES SERPL-MCNC: 0 MG/DL (ref 15–25)
SAO2 % BLDA: 96.2 % (ref 93–99)
SODIUM SERPL-SCNC: 133 MMOL/L (ref 135–145)
SODIUM SERPL-SCNC: 135 MMOL/L (ref 135–145)
SP GR UR STRIP.AUTO: 1.02
TROPONIN I SERPL-MCNC: <0.01 NG/ML (ref 0–0.04)
UROBILINOGEN UR STRIP.AUTO-MCNC: 0.2 MG/DL
WBC # BLD AUTO: 17.9 K/UL (ref 4.8–10.8)
WBC #/AREA URNS HPF: ABNORMAL /HPF

## 2019-04-25 PROCEDURE — 83605 ASSAY OF LACTIC ACID: CPT

## 2019-04-25 PROCEDURE — 84484 ASSAY OF TROPONIN QUANT: CPT

## 2019-04-25 PROCEDURE — 71045 X-RAY EXAM CHEST 1 VIEW: CPT

## 2019-04-25 PROCEDURE — 99255 IP/OBS CONSLTJ NEW/EST HI 80: CPT | Performed by: INTERNAL MEDICINE

## 2019-04-25 PROCEDURE — 80048 BASIC METABOLIC PNL TOTAL CA: CPT

## 2019-04-25 PROCEDURE — 5A1D70Z PERFORMANCE OF URINARY FILTRATION, INTERMITTENT, LESS THAN 6 HOURS PER DAY: ICD-10-PCS | Performed by: INTERNAL MEDICINE

## 2019-04-25 PROCEDURE — 700102 HCHG RX REV CODE 250 W/ 637 OVERRIDE(OP): Performed by: HOSPITALIST

## 2019-04-25 PROCEDURE — 82010 KETONE BODYS QUAN: CPT

## 2019-04-25 PROCEDURE — 84703 CHORIONIC GONADOTROPIN ASSAY: CPT

## 2019-04-25 PROCEDURE — 99223 1ST HOSP IP/OBS HIGH 75: CPT | Mod: 25 | Performed by: INTERNAL MEDICINE

## 2019-04-25 PROCEDURE — 82803 BLOOD GASES ANY COMBINATION: CPT

## 2019-04-25 PROCEDURE — 93005 ELECTROCARDIOGRAM TRACING: CPT | Performed by: EMERGENCY MEDICINE

## 2019-04-25 PROCEDURE — 770022 HCHG ROOM/CARE - ICU (200)

## 2019-04-25 PROCEDURE — 700111 HCHG RX REV CODE 636 W/ 250 OVERRIDE (IP): Performed by: EMERGENCY MEDICINE

## 2019-04-25 PROCEDURE — 700111 HCHG RX REV CODE 636 W/ 250 OVERRIDE (IP): Performed by: HOSPITALIST

## 2019-04-25 PROCEDURE — 02HV33Z INSERTION OF INFUSION DEVICE INTO SUPERIOR VENA CAVA, PERCUTANEOUS APPROACH: ICD-10-PCS | Performed by: INTERNAL MEDICINE

## 2019-04-25 PROCEDURE — 96375 TX/PRO/DX INJ NEW DRUG ADDON: CPT

## 2019-04-25 PROCEDURE — 700111 HCHG RX REV CODE 636 W/ 250 OVERRIDE (IP): Performed by: INTERNAL MEDICINE

## 2019-04-25 PROCEDURE — 80307 DRUG TEST PRSMV CHEM ANLYZR: CPT

## 2019-04-25 PROCEDURE — 99291 CRITICAL CARE FIRST HOUR: CPT

## 2019-04-25 PROCEDURE — 80053 COMPREHEN METABOLIC PANEL: CPT

## 2019-04-25 PROCEDURE — A9270 NON-COVERED ITEM OR SERVICE: HCPCS | Performed by: HOSPITALIST

## 2019-04-25 PROCEDURE — 96368 THER/DIAG CONCURRENT INF: CPT

## 2019-04-25 PROCEDURE — 90935 HEMODIALYSIS ONE EVALUATION: CPT

## 2019-04-25 PROCEDURE — 99223 1ST HOSP IP/OBS HIGH 75: CPT | Performed by: HOSPITALIST

## 2019-04-25 PROCEDURE — 85025 COMPLETE CBC W/AUTO DIFF WBC: CPT

## 2019-04-25 PROCEDURE — 96367 TX/PROPH/DG ADDL SEQ IV INF: CPT

## 2019-04-25 PROCEDURE — G0480 DRUG TEST DEF 1-7 CLASSES: HCPCS

## 2019-04-25 PROCEDURE — 83930 ASSAY OF BLOOD OSMOLALITY: CPT

## 2019-04-25 PROCEDURE — 700111 HCHG RX REV CODE 636 W/ 250 OVERRIDE (IP)

## 2019-04-25 PROCEDURE — B543ZZA ULTRASONOGRAPHY OF RIGHT JUGULAR VEINS, GUIDANCE: ICD-10-PCS | Performed by: INTERNAL MEDICINE

## 2019-04-25 PROCEDURE — 700105 HCHG RX REV CODE 258: Performed by: EMERGENCY MEDICINE

## 2019-04-25 PROCEDURE — 96365 THER/PROPH/DIAG IV INF INIT: CPT

## 2019-04-25 PROCEDURE — 36556 INSERT NON-TUNNEL CV CATH: CPT | Mod: RT | Performed by: INTERNAL MEDICINE

## 2019-04-25 PROCEDURE — 82693 ASSAY OF ETHYLENE GLYCOL: CPT | Mod: 91

## 2019-04-25 PROCEDURE — 81001 URINALYSIS AUTO W/SCOPE: CPT

## 2019-04-25 PROCEDURE — HZ2ZZZZ DETOXIFICATION SERVICES FOR SUBSTANCE ABUSE TREATMENT: ICD-10-PCS | Performed by: HOSPITALIST

## 2019-04-25 RX ORDER — ACETAMINOPHEN 325 MG/1
650 TABLET ORAL EVERY 6 HOURS PRN
Status: DISCONTINUED | OUTPATIENT
Start: 2019-04-25 | End: 2019-04-27 | Stop reason: HOSPADM

## 2019-04-25 RX ORDER — MIDAZOLAM HYDROCHLORIDE 1 MG/ML
2 INJECTION INTRAMUSCULAR; INTRAVENOUS ONCE
Status: COMPLETED | OUTPATIENT
Start: 2019-04-25 | End: 2019-04-25

## 2019-04-25 RX ORDER — THIAMINE MONONITRATE (VIT B1) 100 MG
100 TABLET ORAL DAILY
Status: DISCONTINUED | OUTPATIENT
Start: 2019-04-26 | End: 2019-04-27 | Stop reason: HOSPADM

## 2019-04-25 RX ORDER — HEPARIN SODIUM 1000 [USP'U]/ML
INJECTION, SOLUTION INTRAVENOUS; SUBCUTANEOUS
Status: COMPLETED
Start: 2019-04-25 | End: 2019-04-25

## 2019-04-25 RX ORDER — LORAZEPAM 2 MG/ML
2 INJECTION INTRAMUSCULAR EVERY 4 HOURS PRN
Status: DISCONTINUED | OUTPATIENT
Start: 2019-04-25 | End: 2019-04-25

## 2019-04-25 RX ORDER — PROMETHAZINE HYDROCHLORIDE 25 MG/1
12.5-25 SUPPOSITORY RECTAL EVERY 4 HOURS PRN
Status: DISCONTINUED | OUTPATIENT
Start: 2019-04-25 | End: 2019-04-26

## 2019-04-25 RX ORDER — AMOXICILLIN 250 MG
2 CAPSULE ORAL 2 TIMES DAILY
Status: DISCONTINUED | OUTPATIENT
Start: 2019-04-25 | End: 2019-04-27 | Stop reason: HOSPADM

## 2019-04-25 RX ORDER — SODIUM CHLORIDE 9 MG/ML
1000 INJECTION, SOLUTION INTRAVENOUS ONCE
Status: COMPLETED | OUTPATIENT
Start: 2019-04-25 | End: 2019-04-25

## 2019-04-25 RX ORDER — HEPARIN SODIUM 1000 [USP'U]/ML
3500 INJECTION, SOLUTION INTRAVENOUS; SUBCUTANEOUS
Status: DISCONTINUED | OUTPATIENT
Start: 2019-04-25 | End: 2019-04-27 | Stop reason: HOSPADM

## 2019-04-25 RX ORDER — HEPARIN SODIUM 1000 [USP'U]/ML
3000 INJECTION, SOLUTION INTRAVENOUS; SUBCUTANEOUS
Status: DISCONTINUED | OUTPATIENT
Start: 2019-04-25 | End: 2019-04-27 | Stop reason: HOSPADM

## 2019-04-25 RX ORDER — ONDANSETRON 4 MG/1
4 TABLET, ORALLY DISINTEGRATING ORAL EVERY 4 HOURS PRN
Status: DISCONTINUED | OUTPATIENT
Start: 2019-04-25 | End: 2019-04-26

## 2019-04-25 RX ORDER — DEXTROSE, SODIUM CHLORIDE, SODIUM LACTATE, POTASSIUM CHLORIDE, AND CALCIUM CHLORIDE 5; .6; .31; .03; .02 G/100ML; G/100ML; G/100ML; G/100ML; G/100ML
1000 INJECTION, SOLUTION INTRAVENOUS CONTINUOUS
Status: DISCONTINUED | OUTPATIENT
Start: 2019-04-25 | End: 2019-04-25

## 2019-04-25 RX ORDER — BISACODYL 10 MG
10 SUPPOSITORY, RECTAL RECTAL
Status: DISCONTINUED | OUTPATIENT
Start: 2019-04-25 | End: 2019-04-27 | Stop reason: HOSPADM

## 2019-04-25 RX ORDER — PROMETHAZINE HYDROCHLORIDE 25 MG/1
12.5-25 TABLET ORAL EVERY 4 HOURS PRN
Status: DISCONTINUED | OUTPATIENT
Start: 2019-04-25 | End: 2019-04-26

## 2019-04-25 RX ORDER — THIAMINE MONONITRATE (VIT B1) 100 MG
100 TABLET ORAL DAILY
Status: DISCONTINUED | OUTPATIENT
Start: 2019-04-28 | End: 2019-04-25

## 2019-04-25 RX ORDER — POLYETHYLENE GLYCOL 3350 17 G/17G
1 POWDER, FOR SOLUTION ORAL
Status: DISCONTINUED | OUTPATIENT
Start: 2019-04-25 | End: 2019-04-27 | Stop reason: HOSPADM

## 2019-04-25 RX ORDER — ONDANSETRON 2 MG/ML
4 INJECTION INTRAMUSCULAR; INTRAVENOUS EVERY 4 HOURS PRN
Status: DISCONTINUED | OUTPATIENT
Start: 2019-04-25 | End: 2019-04-26

## 2019-04-25 RX ADMIN — SODIUM CHLORIDE, SODIUM LACTATE, POTASSIUM CHLORIDE, CALCIUM CHLORIDE AND DEXTROSE MONOHYDRATE 1000 ML: 5; 600; 310; 30; 20 INJECTION, SOLUTION INTRAVENOUS at 10:22

## 2019-04-25 RX ADMIN — SODIUM CHLORIDE 1000 ML: 9 INJECTION, SOLUTION INTRAVENOUS at 07:02

## 2019-04-25 RX ADMIN — LORAZEPAM 2 MG: 2 INJECTION INTRAMUSCULAR; INTRAVENOUS at 07:01

## 2019-04-25 RX ADMIN — HEPARIN SODIUM 3000 UNITS: 1000 INJECTION, SOLUTION INTRAVENOUS; SUBCUTANEOUS at 19:23

## 2019-04-25 RX ADMIN — MIDAZOLAM HYDROCHLORIDE 2 MG: 1 INJECTION, SOLUTION INTRAMUSCULAR; INTRAVENOUS at 11:06

## 2019-04-25 RX ADMIN — HEPARIN SODIUM 3500 UNITS: 1000 INJECTION, SOLUTION INTRAVENOUS; SUBCUTANEOUS at 13:18

## 2019-04-25 RX ADMIN — PYRIDOXINE HYDROCHLORIDE 50 MG: 100 INJECTION, SOLUTION INTRAMUSCULAR; INTRAVENOUS at 11:41

## 2019-04-25 RX ADMIN — ACETAMINOPHEN 650 MG: 325 TABLET, FILM COATED ORAL at 23:31

## 2019-04-25 RX ADMIN — THIAMINE HYDROCHLORIDE 100 MG: 100 INJECTION, SOLUTION INTRAMUSCULAR; INTRAVENOUS at 11:41

## 2019-04-25 RX ADMIN — ACETAMINOPHEN 650 MG: 325 TABLET, FILM COATED ORAL at 13:48

## 2019-04-25 RX ADMIN — ONDANSETRON 4 MG: 2 INJECTION INTRAMUSCULAR; INTRAVENOUS at 23:27

## 2019-04-25 RX ADMIN — FOMEPIZOLE 1340 MG: 1 INJECTION, SOLUTION INTRAVENOUS at 10:16

## 2019-04-25 ASSESSMENT — ENCOUNTER SYMPTOMS
SORE THROAT: 1
HEADACHES: 0
DIARRHEA: 0
VOMITING: 1
SHORTNESS OF BREATH: 0
NECK PAIN: 0
BACK PAIN: 0
DOUBLE VISION: 0
PALPITATIONS: 1
ABDOMINAL PAIN: 0
DIZZINESS: 1
VOMITING: 0
HALLUCINATIONS: 0
SPUTUM PRODUCTION: 1
SPEECH CHANGE: 0
BLURRED VISION: 0
NAUSEA: 1
HEADACHES: 1
FEVER: 0
CHILLS: 0
DIZZINESS: 0
NERVOUS/ANXIOUS: 1
COUGH: 1
SENSORY CHANGE: 0
BLOOD IN STOOL: 0
WHEEZING: 0
DEPRESSION: 0
BRUISES/BLEEDS EASILY: 0
INSOMNIA: 1

## 2019-04-25 ASSESSMENT — COGNITIVE AND FUNCTIONAL STATUS - GENERAL
DAILY ACTIVITIY SCORE: 24
MOBILITY SCORE: 24
SUGGESTED CMS G CODE MODIFIER DAILY ACTIVITY: CH
SUGGESTED CMS G CODE MODIFIER MOBILITY: CH

## 2019-04-25 ASSESSMENT — LIFESTYLE VARIABLES
HAVE YOU EVER FELT YOU SHOULD CUT DOWN ON YOUR DRINKING: NO
HAVE PEOPLE ANNOYED YOU BY CRITICIZING YOUR DRINKING: NO
TOTAL SCORE: 1
TOTAL SCORE: 1
EVER HAD A DRINK FIRST THING IN THE MORNING TO STEADY YOUR NERVES TO GET RID OF A HANGOVER: NO
CONSUMPTION TOTAL: INCOMPLETE
EVER FELT BAD OR GUILTY ABOUT YOUR DRINKING: YES
TOTAL SCORE: 1
EVER FELT BAD OR GUILTY ABOUT YOUR DRINKING: YES
TOTAL SCORE: 1
ALCOHOL_USE: YES
SUBSTANCE_ABUSE: 1
HAVE YOU EVER FELT YOU SHOULD CUT DOWN ON YOUR DRINKING: NO
HOW MANY TIMES IN THE PAST YEAR HAVE YOU HAD 5 OR MORE DRINKS IN A DAY: 2
CONSUMPTION TOTAL: POSITIVE
AVERAGE NUMBER OF DAYS PER WEEK YOU HAVE A DRINK CONTAINING ALCOHOL: 2
ALCOHOL_USE: YES
TOTAL SCORE: 1
HAVE PEOPLE ANNOYED YOU BY CRITICIZING YOUR DRINKING: NO
ON A TYPICAL DAY WHEN YOU DRINK ALCOHOL HOW MANY DRINKS DO YOU HAVE: 4
EVER HAD A DRINK FIRST THING IN THE MORNING TO STEADY YOUR NERVES TO GET RID OF A HANGOVER: NO
ALCOHOL_USE: YES
TOTAL SCORE: 1
HOW MANY TIMES IN THE PAST YEAR HAVE YOU HAD 5 OR MORE DRINKS IN A DAY: 2

## 2019-04-25 ASSESSMENT — PATIENT HEALTH QUESTIONNAIRE - PHQ9
6. FEELING BAD ABOUT YOURSELF - OR THAT YOU ARE A FAILURE OR HAVE LET YOURSELF OR YOUR FAMILY DOWN: NOT AL ALL
1. LITTLE INTEREST OR PLEASURE IN DOING THINGS: NEARLY EVERY DAY
4. FEELING TIRED OR HAVING LITTLE ENERGY: NOT AT ALL
SUM OF ALL RESPONSES TO PHQ9 QUESTIONS 1 AND 2: 4
5. POOR APPETITE OR OVEREATING: NOT AT ALL
2. FEELING DOWN, DEPRESSED, IRRITABLE, OR HOPELESS: SEVERAL DAYS
7. TROUBLE CONCENTRATING ON THINGS, SUCH AS READING THE NEWSPAPER OR WATCHING TELEVISION: NOT AT ALL
9. THOUGHTS THAT YOU WOULD BE BETTER OFF DEAD, OR OF HURTING YOURSELF: NOT AT ALL
SUM OF ALL RESPONSES TO PHQ QUESTIONS 1-9: 5
3. TROUBLE FALLING OR STAYING ASLEEP OR SLEEPING TOO MUCH: SEVERAL DAYS
8. MOVING OR SPEAKING SO SLOWLY THAT OTHER PEOPLE COULD HAVE NOTICED. OR THE OPPOSITE, BEING SO FIGETY OR RESTLESS THAT YOU HAVE BEEN MOVING AROUND A LOT MORE THAN USUAL: NOT AT ALL

## 2019-04-25 NOTE — ED TRIAGE NOTES
"[place] Uribe  44 y.o. female  Chief Complaint   Patient presents with   • Anxiety     \"My son didnt come home today, so I get scared and had a panic attack\"       Pt amb to triage with steady gait for above complaint. HR noted to be 149.  Otherwise VSSPt is alert and oriented, speaking in full sentences, follows commands and responds appropriately to questions. NAD. Resp are even and unlabored.  Pt placed in lobby. Pt educated on triage process. Pt encouraged to alert staff for any changes.  /95   Pulse (!) 149   Temp 36.1 °C (97 °F) (Temporal)   Resp 16   Ht 1.753 m (5' 9\")   Wt 89 kg (196 lb 3.4 oz)   SpO2 96%   BMI 28.98 kg/m²     "
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
## 2019-04-25 NOTE — PSYCHIATRY
Telephone consult received. Please place official psychiatric consult in [facility] when patient is ready to be evaluated. Thank you.

## 2019-04-25 NOTE — ASSESSMENT & PLAN NOTE
Vitamin supplementation  Monitor for evidence of withdrawal  Counseling on cessation when clinically appropriate

## 2019-04-25 NOTE — ED NOTES
Pt tolerated medication without difficulty.  No vomiting after medications.  No S&S of reactions.  Will continue to monitor.  Dialysis remains at bedside.

## 2019-04-25 NOTE — ED NOTES
Assumed pt care at this time.  Pt IV infusing at this time without difficulty.  No S&S of infiltration, overload, or reactions.  Will continue to monitor.

## 2019-04-25 NOTE — ED NOTES
Pt remains resting in bed at this time.  No needs verbalized.  Will continue to monitor.  Dialysis rn present, continues dialysis at this time.

## 2019-04-25 NOTE — PROCEDURES
Procedure Note    Date: 4/25/2019  Time: 1130    Procedure: Temporary non-tunneled hemodialysis Line placement  Site: R IJ vein    Indication: Need for emergent hemodialysis due to toxin ingestion and severe metabolic acidosis  Consent: Informed consent obtained from patient or designated decision maker after explaining the benefits/risks of the procedure including but not limited to bleeding, infection, nerve or other deep structure injury, pneumothorax/hemothorax, arrythmia, or death. Patient or surrogate expressed understanding and agreement and signed consent which can be found in the patient's chart.    Procedure: After obtaining consent, a time-out was performed. Appropriate site confirmed with ultrasound and patient positioned, prepped, and draped in sterile fashion. All those present wearing cap and mask and those physically participating remained adhering to sterile fashion with cap, mask, gloves, and gown.  5 mL of local anesthetic injected (1% lidocaine without epinephrine) achieving appropriate comfort level for patient. Vein localized and accessed using continuous ultrasound guidance and a 12 Fr 16 cm dialysis catheter placed using Seldinger technique. Able to aspirate dark, non-pulsatile blood through each lumen and sterile saline flushed easily before capping. Line secured and dressed in sterile fashion. Patient tolerated procedure well without any difficulties and remains in care of bedside nurse. CXR will be performed to confirm appropriate placement for internal jugular or subclavian CVLs.    EBL: minimal  Complications: None  CXR: Pending    Jeremy Gonda, MD  Critical Care Medicine

## 2019-04-25 NOTE — ED NOTES
Md at bedside.  Timeout completed.  Consent signed.  Pt tolerated medication without difficulty.  No vomiting after medications.  No S&S of reactions.  Will continue to monitor.

## 2019-04-25 NOTE — ASSESSMENT & PLAN NOTE
Reportedly was drinking vodka prior to admission  Alcohol level was low at 0.03  Watch for withdrawals  Thiamine and folate

## 2019-04-25 NOTE — ASSESSMENT & PLAN NOTE
Secondary to alcohol intoxication  Improved  IV fluids  Monitor labs and vitals along with strict I's and O's

## 2019-04-25 NOTE — CONSULTS
Consults   Nephrology Inpatient Consultation    Date of Service  2019    Reason for Consultation  Severe acidosis, possible toxic ingestion    History of Presenting Illness  44 y.o. female admitted 2019 with severe acidosis likely secondary to a toxic ingestion.  The patient has a history of significant alcohol use.  In fact, she was recently admitted in March with severe alcohol intoxication as well as severe metabolic acidosis and required emergent dialysis at that time.  The only history obtainable from the patient is that she drank quite a lot of vodka last night.  She states that she obtained this alcohol from a store.  She denies any toxic ingestions at this time.    The patient was found to have severe antibiotic gap acidosis as well as osmolal gap.  Her Tylenol level and salicylate level are negative.  Her diagnostic alcohol level is 0.03.  She has an elevated lactic acid of 5.    Referring Physician  Phuc Astorga M.D.    Consulting Physician  Magnus Villarreal M.D.    Review of Systems  ROS   Past Medical History  No past medical history on file.    Surgical History  No past surgical history on file.    Medications  No current facility-administered medications on file prior to encounter.      No current outpatient prescriptions on file prior to encounter.       Family History  family history is not on file.    Social History  Social History   Substance Use Topics   • Smoking status: Never Smoker   • Smokeless tobacco: Never Used   • Alcohol use Yes      Comment: socially       Allergies  No Known Allergies     Physical Exam  Laboratory/Imaging   Hemodynamics  Temp (24hrs), Av.1 °C (97 °F), Min:36.1 °C (97 °F), Max:36.1 °C (97 °F)   Temperature: 36.1 °C (97 °F)  Pulse  Av.7  Min: 125  Max: 149 Heart Rate (Monitored): (!) 124  Blood Pressure: 150/95, NIBP: 129/67      Respiratory      Respiration: 16, Pulse Oximetry: 100 %             Fluids  Date 19 0700 - 19 0659    Shift 9508-8287 3331-4353 8451-3459 24 Hour Total   I  N  T  A  K  E   I.V. 500   500    IV Piggyback 250   250    Shift Total 750   750   O  U  T  P  U  T   Shift Total       Weight (kg) 89 89 89 89         Nutrition  Orders Placed This Encounter   Procedures   • Diet Order Regular     Standing Status:   Standing     Number of Occurrences:   1     Order Specific Question:   Diet:     Answer:   Regular [1]       Physical Exam   Constitutional: She appears well-developed.   Ill-appearing   HENT:   Head: Normocephalic and atraumatic.   Eyes: Pupils are equal, round, and reactive to light. Conjunctivae are normal.   Cardiovascular: Normal rate and regular rhythm.    Pulmonary/Chest: Effort normal and breath sounds normal.   Abdominal: Soft. Bowel sounds are normal.   Musculoskeletal: She exhibits no edema or tenderness.   Neurological: She is alert.   Slightly confused, poor memory of her recent history   Skin: Skin is warm and dry.   Under black light, there are flecks of fluorescent material around her chin and upper chest.  Some of these appear to be a dried substance.       Recent Labs      04/25/19   0702   WBC  17.9*   RBC  4.57   HEMOGLOBIN  15.4   HEMATOCRIT  46.5   MCV  101.8*   MCH  33.7*   MCHC  33.1*   RDW  50.5*   PLATELETCT  355   MPV  9.1     Recent Labs      04/25/19   0702   SODIUM  133*   POTASSIUM  4.5   CHLORIDE  102   CO2  6*   GLUCOSE  167*   BUN  16   CREATININE  0.93   CALCIUM  9.3                      Assessment/Plan     1.  Severe metabolic acidosis, high suspicion for toxic ingestion, toxic alcohols have been sent including ethylene glycol which in my opinion is the most likely substance.  2.  Mild hyponatremia  3.  Lactic acidosis with an osmolar gap    -Discussed with Dr. Astorga as well as Dr. Barreto  -The differential diagnosis includes toxic ingestion which is life-threatening.  Given the life-threatening nature of this and the high likelihood in my opinion of this, emergent dialysis  is called for.  -Given the possible substances, I have written for conventional hemodialysis x6 hours  -Given the degree of acidosis, and the potassium of 4.5, the patient is actually somewhat hypokalemic which should be corrected with dialysis.  -We will follow closely

## 2019-04-25 NOTE — ED NOTES
Pt remains resting in bed at this time.  Dialysis present at this time.  Will continue to monitor.

## 2019-04-25 NOTE — DISCHARGE PLANNING
CC assessment copied last admit March 19th:    Transition Team Assessment  In the case of an emergency, pt's legal NOK is Mother Faviola Christy      RNCM met with pt at bedside and obtained the information used in this assessment. Pt verified accuracy of facesheet, correct address is [facility] [place]. Pt lives in a 2 story townhouse alone. . Prior to current hospitalization, pt was completely independent in ADLS/IADLS. Pt drives and is able to attend necessary MD appointments.  Pt has no financial concerns. Pt has a good support system. Pt denies any hx of substance use and denies any dx of mh. Pt plans to discharge to home alone.        Information Source pt  Orientation : Oriented x 4  Information Given By: Patient  Informant's Name:  ([place])  Who is responsible for making decisions for patient? : Patient           Elopement Risk  Legal Hold: No  Ambulatory or Self Mobile in Wheelchair: Yes  Disoriented: No  Psychiatric Symptoms: None  History of Wandering: No  Elopement this Admit: No  Vocalizing Wanting to Leave: No  Displays Behaviors, Body Language Wanting to Leave: No-Not at Risk for Elopement  Elopement Risk: Not at Risk for Elopement  Picture of Patient on Inside Chart Front Cover: No (See Comments)     Interdisciplinary Discharge Planning  Does Admitting Nurse Feel This Could be a Complex Discharge?: No  Primary Care Physician: none  Lives with - Patient's Self Care Capacity: Alone and Able to Care For Self  Patient or legal guardian wants to designate a caregiver (see row info): No  Support Systems: Family Member(s)  Housing / Facility: 2 Story Apartment / Condo  Do You Take your Prescribed Medications Regularly: No  Mobility Issues: No  Prior Services: None  Patient Expects to be Discharged to::  (home)     Discharge Preparedness  What are your discharge supports?: Child, Parent  Prior Functional Level: Ambulatory, Drives Self, Independent with Activities of Daily Living,  Independent with Medication Management  Difficulity with ADLs: None  Difficulity with IADLs: None     Functional Assesment  Prior Functional Level: Ambulatory, Drives Self, Independent with Activities of Daily Living, Independent with Medication Management     Finances  Financial Barriers to Discharge: No     Vision / Hearing Impairment  Vision Impairment : Yes  Right Eye Vision: Impaired, Wears Glasses  Left Eye Vision: Impaired, Wears Glasses  Hearing Impairment : No                 Domestic Abuse  Have you ever been the victim of abuse or violence?: No  Physical Abuse or Sexual Abuse: No  Verbal Abuse or Emotional Abuse: No  Possible Abuse Reported to:: Not Applicable     Psychological Assessment  History of Substance Abuse: None  History of Psychiatric Problems: No

## 2019-04-25 NOTE — ED NOTES
Pt IV medications completed at this time.  Pt IV infusing at this time without difficulty.  No S&S of infiltration, reactions, or overload.  Will continue to monitor.

## 2019-04-25 NOTE — ASSESSMENT & PLAN NOTE
4/25 bicarb level 6 with initial anion gap of 25  4/26 bicarb level 28 with anion gap of 7  Alcohol versus potential other agent such as ethylene glycol of concern  Discussed with Dr. Villarreal, nephrology  Monitoring labs with downtrending lactic acid  Poison control has been consulted 4/25  Psychiatry consulted

## 2019-04-25 NOTE — ED NOTES
Med rec completed per pt.   Antibiotics within last 30 days: No  Patient allergies have been reviewed: ELI
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
## 2019-04-25 NOTE — ASSESSMENT & PLAN NOTE
HD per nephrology status post session overnight  Psychiatry evaluation  Legal hold  Continue to follow labs closely

## 2019-04-25 NOTE — H&P
Hospital Medicine History & Physical Note    Date of Service  4/25/2019    Primary Care Physician  No primary care provider on file.    Consultants  Nephrology, I discussed with Dr. Villarreal  Critical Care, I discussed with Dr. Gonda  Psychiatry   Code Status  full    Chief Complaint  Panic attack     History of Presenting Illness  44 y.o. female who presented 4/25/2019 with severe anxiety. Ms. Uribe has no previously known medical conditions that was most recently admitted 3/16/2019 through 3/20/2019 with alcohol intoxication with a severe metabolic acidosis for which she underwent emergent dialysis.  She states that she has not drank any alcohol since that admission until yesterday she drank more between half a bottle and a bottle of vodka last night.  He states she became very anxious and therefore presented here for what she thought to be a panic attack.  In the emergency room, she was found to have a severe metabolic acidosis with bicarb of 6 and an anion gap of 25.  She quite adamantly denies drinking methanol or ethylene glycol.  In the emergency room, we used a fluorescent light which did show slight bits fluorescence though most consistent with movement as opposed to ethylene glycol on her chest and lower chin.   In the emergency room, a dialysis catheter has been placed and she is been initiated on emergent dialysis will be admitted to the intensive care unit.  I filled out paperwork to initiate a legal hold and have left a message with psychiatry to see her.  Review of Systems  Review of Systems   Constitutional: Negative for chills and fever.   Respiratory: Negative for shortness of breath.    Cardiovascular: Negative for chest pain and leg swelling.   Gastrointestinal: Positive for nausea.   Psychiatric/Behavioral: Positive for substance abuse. Negative for suicidal ideas. The patient is nervous/anxious and has insomnia.    All other systems reviewed and are negative.      Past Medical History    has no past medical history on file.    Surgical History   has no past surgical history on file.     Family History  family history is not on file.     Social History   reports that she has never smoked. She has never used smokeless tobacco. She reports that she drinks alcohol. She reports that she does not use drugs.    Allergies  No Known Allergies    Medications  None       Physical Exam  Temp:  [36.1 °C (97 °F)] 36.1 °C (97 °F)  Pulse:  [132-149] 132  Resp:  [16] 16  BP: (150)/(95) 150/95  SpO2:  [95 %-99 %] 99 %    Physical Exam   Constitutional: She is oriented to person, place, and time. She appears well-developed and well-nourished. No distress.   HENT:   Head: Normocephalic and atraumatic.   Mouth/Throat: Oropharynx is clear and moist.   Eyes: Right eye exhibits no discharge. Left eye exhibits no discharge. No scleral icterus.   Neck: Normal range of motion. Neck supple.   Cardiovascular:   Sinus tachycardia  No murmur   Pulmonary/Chest:   Mild tachypnea, no wheezing, good air movement   Abdominal: Soft. She exhibits no distension. There is no tenderness.   Musculoskeletal: She exhibits no edema or tenderness.   Neurological: She is alert and oriented to person, place, and time.   Skin: Skin is warm and dry. No rash noted. She is not diaphoretic.   Psychiatric:   Flat affect, she is cooperative with exam   Nursing note and vitals reviewed.      Laboratory:  Recent Labs      04/25/19   0702   WBC  17.9*   RBC  4.57   HEMOGLOBIN  15.4   HEMATOCRIT  46.5   MCV  101.8*   MCH  33.7*   MCHC  33.1*   RDW  50.5*   PLATELETCT  355   MPV  9.1     Recent Labs      04/25/19   0702   SODIUM  133*   POTASSIUM  4.5   CHLORIDE  102   CO2  6*   GLUCOSE  167*   BUN  16   CREATININE  0.93   CALCIUM  9.3     Recent Labs      04/25/19   0702   ALTSGPT  39   ASTSGOT  31   ALKPHOSPHAT  115*   TBILIRUBIN  0.7   GLUCOSE  167*                 Recent Labs      04/25/19   0702   TROPONINI  <0.01       Urinalysis:    Recent Labs       04/25/19   0918   SPECGRAVITY  1.020   GLUCOSEUR  Negative   KETONES  80*   NITRITE  Negative   LEUKESTERAS  Negative   WBCURINE  0-2   RBCURINE  2-5*   BACTERIA  Few*   EPITHELCELL  Few        Imaging:  DX-CHEST-LIMITED (1 VIEW)   Final Result      1.  Right internal jugular catheter appears appropriately located      2.  No pneumothorax      DX-CHEST-PORTABLE (1 VIEW)   Final Result      No evidence of acute cardiopulmonary process.            Assessment/Plan:  I anticipate this patient will require at least two midnights for appropriate medical management, necessitating inpatient admission.    Lactic acidosis- (present on admission)   Assessment & Plan    Severe lactic acidosis secondary to alcohol and perhaps a second agent such as methanol or ethylene glycol both of which will have levels sent.  Lactic acid in the ER is 5 and will be repeated after dialysis.   Her bicarb is extremely low at 6 with an anion gap of 25  She will be admitted to the ICU with emergent dialysis.  Labs have been ordered for the morning.   Poison control has been consulted.   Psychiatry has been consulted to discuss +/- purposeful overdose and a legal hold has been initiated in the ER.      Acute alcoholic intoxication with complication (HCC)- (present on admission)   Assessment & Plan    She drank excess Vodka last night.  Blood alcohol level in the ER was 0.03  She is at risk of detox and will be monitored for withdrawal symptoms.      SIRS (systemic inflammatory response syndrome) (HCC)- (present on admission)   Assessment & Plan    Non-infectious SIRS with leukocytosis of 17.9 and tachycardia  Secondary to alcohol intoxication          VTE prophylaxis: lovenox

## 2019-04-26 PROBLEM — E83.42 HYPOMAGNESEMIA: Status: ACTIVE | Noted: 2019-04-26

## 2019-04-26 PROBLEM — E87.6 HYPOKALEMIA: Status: ACTIVE | Noted: 2019-04-26

## 2019-04-26 LAB
ALBUMIN SERPL BCP-MCNC: 4.1 G/DL (ref 3.2–4.9)
ALBUMIN/GLOB SERPL: 1.2 G/DL
ALP SERPL-CCNC: 95 U/L (ref 30–99)
ALT SERPL-CCNC: 33 U/L (ref 2–50)
ANION GAP SERPL CALC-SCNC: 7 MMOL/L (ref 0–11.9)
ANION GAP SERPL CALC-SCNC: 9 MMOL/L (ref 0–11.9)
AST SERPL-CCNC: 36 U/L (ref 12–45)
BILIRUB SERPL-MCNC: 1.5 MG/DL (ref 0.1–1.5)
BUN SERPL-MCNC: 13 MG/DL (ref 8–22)
BUN SERPL-MCNC: 13 MG/DL (ref 8–22)
CALCIUM SERPL-MCNC: 8.9 MG/DL (ref 8.5–10.5)
CALCIUM SERPL-MCNC: 9.4 MG/DL (ref 8.5–10.5)
CHLORIDE SERPL-SCNC: 98 MMOL/L (ref 96–112)
CHLORIDE SERPL-SCNC: 99 MMOL/L (ref 96–112)
CO2 SERPL-SCNC: 27 MMOL/L (ref 20–33)
CO2 SERPL-SCNC: 28 MMOL/L (ref 20–33)
CREAT SERPL-MCNC: 0.74 MG/DL (ref 0.5–1.4)
CREAT SERPL-MCNC: 0.83 MG/DL (ref 0.5–1.4)
EKG IMPRESSION: NORMAL
ERYTHROCYTE [DISTWIDTH] IN BLOOD BY AUTOMATED COUNT: 44 FL (ref 35.9–50)
GLOBULIN SER CALC-MCNC: 3.3 G/DL (ref 1.9–3.5)
GLUCOSE SERPL-MCNC: 118 MG/DL (ref 65–99)
GLUCOSE SERPL-MCNC: 129 MG/DL (ref 65–99)
HCT VFR BLD AUTO: 36.3 % (ref 37–47)
HGB BLD-MCNC: 12.9 G/DL (ref 12–16)
MAGNESIUM SERPL-MCNC: 1.9 MG/DL (ref 1.5–2.5)
MCH RBC QN AUTO: 33.7 PG (ref 27–33)
MCHC RBC AUTO-ENTMCNC: 35.5 G/DL (ref 33.6–35)
MCV RBC AUTO: 94.8 FL (ref 81.4–97.8)
PLATELET # BLD AUTO: 199 K/UL (ref 164–446)
PMV BLD AUTO: 9.1 FL (ref 9–12.9)
POTASSIUM SERPL-SCNC: 3.1 MMOL/L (ref 3.6–5.5)
POTASSIUM SERPL-SCNC: 3.4 MMOL/L (ref 3.6–5.5)
PROT SERPL-MCNC: 7.4 G/DL (ref 6–8.2)
RBC # BLD AUTO: 3.83 M/UL (ref 4.2–5.4)
SODIUM SERPL-SCNC: 133 MMOL/L (ref 135–145)
SODIUM SERPL-SCNC: 135 MMOL/L (ref 135–145)
WBC # BLD AUTO: 9 K/UL (ref 4.8–10.8)

## 2019-04-26 PROCEDURE — A9270 NON-COVERED ITEM OR SERVICE: HCPCS | Performed by: INTERNAL MEDICINE

## 2019-04-26 PROCEDURE — 99232 SBSQ HOSP IP/OBS MODERATE 35: CPT | Performed by: INTERNAL MEDICINE

## 2019-04-26 PROCEDURE — 80048 BASIC METABOLIC PNL TOTAL CA: CPT

## 2019-04-26 PROCEDURE — 99233 SBSQ HOSP IP/OBS HIGH 50: CPT | Performed by: INTERNAL MEDICINE

## 2019-04-26 PROCEDURE — 93005 ELECTROCARDIOGRAM TRACING: CPT | Performed by: INTERNAL MEDICINE

## 2019-04-26 PROCEDURE — 93010 ELECTROCARDIOGRAM REPORT: CPT | Performed by: INTERNAL MEDICINE

## 2019-04-26 PROCEDURE — 99233 SBSQ HOSP IP/OBS HIGH 50: CPT | Performed by: HOSPITALIST

## 2019-04-26 PROCEDURE — 700111 HCHG RX REV CODE 636 W/ 250 OVERRIDE (IP): Performed by: HOSPITALIST

## 2019-04-26 PROCEDURE — 80053 COMPREHEN METABOLIC PANEL: CPT

## 2019-04-26 PROCEDURE — 700102 HCHG RX REV CODE 250 W/ 637 OVERRIDE(OP): Performed by: INTERNAL MEDICINE

## 2019-04-26 PROCEDURE — 85027 COMPLETE CBC AUTOMATED: CPT

## 2019-04-26 PROCEDURE — 770006 HCHG ROOM/CARE - MED/SURG/GYN SEMI*

## 2019-04-26 PROCEDURE — 83735 ASSAY OF MAGNESIUM: CPT

## 2019-04-26 PROCEDURE — 700111 HCHG RX REV CODE 636 W/ 250 OVERRIDE (IP): Performed by: INTERNAL MEDICINE

## 2019-04-26 PROCEDURE — 99222 1ST HOSP IP/OBS MODERATE 55: CPT | Performed by: PSYCHIATRY & NEUROLOGY

## 2019-04-26 RX ORDER — SODIUM CHLORIDE 9 MG/ML
INJECTION, SOLUTION INTRAVENOUS
Status: ACTIVE
Start: 2019-04-26 | End: 2019-04-26

## 2019-04-26 RX ORDER — MAGNESIUM SULFATE HEPTAHYDRATE 40 MG/ML
2 INJECTION, SOLUTION INTRAVENOUS ONCE
Status: COMPLETED | OUTPATIENT
Start: 2019-04-26 | End: 2019-04-26

## 2019-04-26 RX ORDER — POTASSIUM CHLORIDE 1.5 G/1.58G
40 POWDER, FOR SOLUTION ORAL ONCE
Status: COMPLETED | OUTPATIENT
Start: 2019-04-26 | End: 2019-04-26

## 2019-04-26 RX ADMIN — ONDANSETRON 4 MG: 2 INJECTION INTRAMUSCULAR; INTRAVENOUS at 06:04

## 2019-04-26 RX ADMIN — MAGNESIUM SULFATE IN WATER 2 G: 40 INJECTION, SOLUTION INTRAVENOUS at 13:56

## 2019-04-26 RX ADMIN — ONDANSETRON 4 MG: 2 INJECTION INTRAMUSCULAR; INTRAVENOUS at 10:08

## 2019-04-26 RX ADMIN — ENOXAPARIN SODIUM 40 MG: 100 INJECTION SUBCUTANEOUS at 06:04

## 2019-04-26 RX ADMIN — POTASSIUM CHLORIDE 40 MEQ: 1.5 POWDER, FOR SOLUTION ORAL at 00:33

## 2019-04-26 ASSESSMENT — ENCOUNTER SYMPTOMS
ABDOMINAL PAIN: 0
VOMITING: 1
FOCAL WEAKNESS: 0
FEVER: 0
NECK PAIN: 0
NERVOUS/ANXIOUS: 0
DEPRESSION: 1
PALPITATIONS: 0
SHORTNESS OF BREATH: 0
DIZZINESS: 0
ABDOMINAL PAIN: 1
TREMORS: 0
CHILLS: 0
BLURRED VISION: 0
HEADACHES: 1
SPUTUM PRODUCTION: 0
COUGH: 1
SORE THROAT: 0
NAUSEA: 1
SPEECH CHANGE: 0
BACK PAIN: 0

## 2019-04-26 ASSESSMENT — LIFESTYLE VARIABLES: SUBSTANCE_ABUSE: 1

## 2019-04-26 NOTE — PROGRESS NOTES
2 RN skin check complete on arrival to unit.     Mild bruising noted on bilateral lower extremities, pt states is from fall a few weeks ago. Small old healing wound present on left big toe. Bruising noted right arm AC.

## 2019-04-26 NOTE — CARE PLAN
Problem: Safety  Goal: Will remain free from falls  Outcome: PROGRESSING AS EXPECTED  Treaded slipper socks on pt, bed low and locked, call light in reach.   Intervention: Assess risk factors for falls  Completed.   Intervention: Implement fall precautions  Completed.       Problem: Pain Management  Goal: Pain level will decrease to patient's comfort goal  Outcome: PROGRESSING AS EXPECTED  Pt c/o upper abdominal pain 3/10 with nausea/vomiting. Gave PRN tylenol and zofran.   Intervention: Follow pain managment plan developed in collaboration with patient and Interdisciplinary Team  Completed.   Intervention: Educate and implement non-pharmacologic comfort measures. Examples: relaxation, distration, play therapy, activity therapy, massage, etc.  Completed.
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
## 2019-04-26 NOTE — PROGRESS NOTES
Nephrology Daily Progress Note    Date of Service  4/26/2019    Chief Complaint  44 y.o. female admitted 4/25/2019 with toxic ingestion    Interval Problem Update  Patient underwent 6 hours of high intensity dialysis; Acidosis resolved. She denies any toxic ingestions.    Review of Systems  Review of Systems   Constitutional: Negative for chills and fever.   Cardiovascular: Negative for chest pain and palpitations.   All other systems reviewed and are negative.       Physical Exam  Temp:  [36.3 °C (97.4 °F)-37 °C (98.6 °F)] 36.3 °C (97.4 °F)  Pulse:  [] 115  Resp:  [14-20] 16  SpO2:  [95 %-100 %] 97 %    Physical Exam   Constitutional: She is oriented to person, place, and time. She appears well-developed and well-nourished.   HENT:   Head: Normocephalic and atraumatic.   Cardiovascular: Normal rate and regular rhythm.    Pulmonary/Chest: Effort normal and breath sounds normal.   Abdominal: Soft. Bowel sounds are normal.   Musculoskeletal: She exhibits no edema or tenderness.   Neurological: She is alert and oriented to person, place, and time.   Skin: Skin is warm and dry.       Fluids    Intake/Output Summary (Last 24 hours) at 04/26/19 0836  Last data filed at 04/26/19 0600   Gross per 24 hour   Intake             2930 ml   Output             2290 ml   Net              640 ml       Laboratory  Recent Labs      04/25/19   0702  04/26/19   0250   WBC  17.9*  9.0   RBC  4.57  3.83*   HEMOGLOBIN  15.4  12.9   HEMATOCRIT  46.5  36.3*   MCV  101.8*  94.8   MCH  33.7*  33.7*   MCHC  33.1*  35.5*   RDW  50.5*  44.0   PLATELETCT  355  199   MPV  9.1  9.1     Recent Labs      04/25/19   0702  04/25/19   2040  04/26/19   0250   SODIUM  133*  135  133*   POTASSIUM  4.5  3.2*  3.4*   CHLORIDE  102  97  98   CO2  6*  27  28   GLUCOSE  167*  133*  118*   BUN  16  8  13   CREATININE  0.93  0.53  0.74   CALCIUM  9.3  8.7  8.9                   Imaging  DX-CHEST-LIMITED (1 VIEW)   Final Result      1.  Right internal jugular  catheter appears appropriately located      2.  No pneumothorax      DX-CHEST-PORTABLE (1 VIEW)   Final Result      No evidence of acute cardiopulmonary process.           Assessment/Plan  1. Suspicion for Toxic alcohol ingestion - s/p HD, no further HD needed  2. Acidosis - as a result of #1, now resolved  3. Mild hypokalemia - from correction of the acidosis, due to transcellular shift    -d/c HD catheter  -Will sign off, please call with any issues

## 2019-04-26 NOTE — PROGRESS NOTES
1950 - Pt arrived on unit from ED, transported by RN and CNA. Unnecessary equipment removed from room. Sinus tachy 120s, -130s. Pt reports no SI/HI, flat affect and cooperative.     2030 - Sitter at bedside. Pt belongings inventoried and locked in CICU safekeeping locker.

## 2019-04-26 NOTE — DISCHARGE PLANNING
Care Transition Team Discharge Planning     Anticipated Discharge Disposition: TBD     Action: This RN CM faxed legal hold documentation to [employer].      Barriers to Discharge: None.      Plan: No other D/C needs identified at this time.
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
## 2019-04-26 NOTE — ED NOTES
Pt remains resting in bed at this time.  Pt verbalizes no needs.  Will continue to monitor.  Dialysis continues at bedside.

## 2019-04-26 NOTE — CARE PLAN
Problem: Communication  Goal: The ability to communicate needs accurately and effectively will improve  Outcome: PROGRESSING AS EXPECTED  Patient alert and oriented, displays correct use of call bell, and is able to express needs.     Problem: Safety  Goal: Will remain free from injury  Outcome: PROGRESSING AS EXPECTED  Patient calls out and waits for assistance with ambulation and line management.  Legal 2000 lifted, no longer requiring a sitter.  Patient denies wanting to harm herself. Bed low and locked, non skid socks on feet.
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
## 2019-04-26 NOTE — ED NOTES
Pt eating boxed meal at this time, provided with additional cup of ice at this time.  Will continue to monitor.

## 2019-04-26 NOTE — PROGRESS NOTES
STAT HD Tx per Dr. NANCY Villarreal x 6 hrs., initiated at 1323 via RIJ catheter, ended at 1923, net UF 1300 ml. See paper flow-sheet for details.    Report given to JOCELINE Rowe RN.

## 2019-04-26 NOTE — ASSESSMENT & PLAN NOTE
This is not been confirmed but suspicious due to anion gap acidosis  Nephrology was concerned with some fluorescent findings on patient with lighting in the emergency room  Patient denies suicidal ideations/depression  On legal hold for now psychiatry consulted

## 2019-04-27 VITALS
TEMPERATURE: 98 F | WEIGHT: 183.42 LBS | HEART RATE: 96 BPM | DIASTOLIC BLOOD PRESSURE: 70 MMHG | SYSTOLIC BLOOD PRESSURE: 108 MMHG | BODY MASS INDEX: 27.17 KG/M2 | RESPIRATION RATE: 18 BRPM | OXYGEN SATURATION: 94 % | HEIGHT: 69 IN

## 2019-04-27 PROBLEM — E87.20 LACTIC ACIDOSIS: Status: RESOLVED | Noted: 2019-03-17 | Resolved: 2019-04-27

## 2019-04-27 PROBLEM — E83.42 HYPOMAGNESEMIA: Status: RESOLVED | Noted: 2019-04-26 | Resolved: 2019-04-27

## 2019-04-27 PROBLEM — T52.8X1A METABOLIC ACIDOSIS DUE TO ETHYLENE GLYCOL: Status: RESOLVED | Noted: 2019-03-17 | Resolved: 2019-04-27

## 2019-04-27 PROBLEM — R65.10 SIRS (SYSTEMIC INFLAMMATORY RESPONSE SYNDROME) (HCC): Status: RESOLVED | Noted: 2019-04-25 | Resolved: 2019-04-27

## 2019-04-27 PROBLEM — F10.929 ACUTE ALCOHOLIC INTOXICATION WITH COMPLICATION (HCC): Status: RESOLVED | Noted: 2019-04-25 | Resolved: 2019-04-27

## 2019-04-27 PROBLEM — E87.6 HYPOKALEMIA: Status: RESOLVED | Noted: 2019-04-26 | Resolved: 2019-04-27

## 2019-04-27 PROBLEM — E87.29 METABOLIC ACIDOSIS DUE TO ETHYLENE GLYCOL: Status: RESOLVED | Noted: 2019-03-17 | Resolved: 2019-04-27

## 2019-04-27 LAB
ALBUMIN SERPL BCP-MCNC: 3.8 G/DL (ref 3.2–4.9)
ALBUMIN/GLOB SERPL: 1.4 G/DL
ALP SERPL-CCNC: 111 U/L (ref 30–99)
ALT SERPL-CCNC: 47 U/L (ref 2–50)
ANION GAP SERPL CALC-SCNC: 10 MMOL/L (ref 0–11.9)
AST SERPL-CCNC: 51 U/L (ref 12–45)
BILIRUB SERPL-MCNC: 0.8 MG/DL (ref 0.1–1.5)
BUN SERPL-MCNC: 13 MG/DL (ref 8–22)
CALCIUM SERPL-MCNC: 9.2 MG/DL (ref 8.5–10.5)
CHLORIDE SERPL-SCNC: 99 MMOL/L (ref 96–112)
CO2 SERPL-SCNC: 27 MMOL/L (ref 20–33)
CREAT SERPL-MCNC: 0.73 MG/DL (ref 0.5–1.4)
ERYTHROCYTE [DISTWIDTH] IN BLOOD BY AUTOMATED COUNT: 43.2 FL (ref 35.9–50)
ETHYLENE GLYCOL SERPLBLD-MCNC: <5 MG/DL
ETHYLENE GLYCOL SERPLBLD-MCNC: <5 MG/DL
GLOBULIN SER CALC-MCNC: 2.8 G/DL (ref 1.9–3.5)
GLUCOSE SERPL-MCNC: 109 MG/DL (ref 65–99)
HCT VFR BLD AUTO: 35.8 % (ref 37–47)
HGB BLD-MCNC: 12.5 G/DL (ref 12–16)
MAGNESIUM SERPL-MCNC: 2.2 MG/DL (ref 1.5–2.5)
MCH RBC QN AUTO: 33.6 PG (ref 27–33)
MCHC RBC AUTO-ENTMCNC: 34.9 G/DL (ref 33.6–35)
MCV RBC AUTO: 96.2 FL (ref 81.4–97.8)
METHANOL SERPL-MCNC: <5 MG/DL
PLATELET # BLD AUTO: 186 K/UL (ref 164–446)
PMV BLD AUTO: 9.6 FL (ref 9–12.9)
POTASSIUM SERPL-SCNC: 3 MMOL/L (ref 3.6–5.5)
PROT SERPL-MCNC: 6.6 G/DL (ref 6–8.2)
RBC # BLD AUTO: 3.72 M/UL (ref 4.2–5.4)
SODIUM SERPL-SCNC: 136 MMOL/L (ref 135–145)
WBC # BLD AUTO: 8.3 K/UL (ref 4.8–10.8)

## 2019-04-27 PROCEDURE — 36415 COLL VENOUS BLD VENIPUNCTURE: CPT

## 2019-04-27 PROCEDURE — A9270 NON-COVERED ITEM OR SERVICE: HCPCS | Performed by: HOSPITALIST

## 2019-04-27 PROCEDURE — 700111 HCHG RX REV CODE 636 W/ 250 OVERRIDE (IP): Performed by: HOSPITALIST

## 2019-04-27 PROCEDURE — 700102 HCHG RX REV CODE 250 W/ 637 OVERRIDE(OP): Performed by: HOSPITALIST

## 2019-04-27 PROCEDURE — 99239 HOSP IP/OBS DSCHRG MGMT >30: CPT | Performed by: HOSPITALIST

## 2019-04-27 PROCEDURE — 83735 ASSAY OF MAGNESIUM: CPT

## 2019-04-27 PROCEDURE — 80053 COMPREHEN METABOLIC PANEL: CPT

## 2019-04-27 PROCEDURE — 85027 COMPLETE CBC AUTOMATED: CPT

## 2019-04-27 RX ORDER — POTASSIUM CHLORIDE 20 MEQ/1
40 TABLET, EXTENDED RELEASE ORAL ONCE
Status: COMPLETED | OUTPATIENT
Start: 2019-04-27 | End: 2019-04-27

## 2019-04-27 RX ADMIN — Medication 100 MG: at 04:20

## 2019-04-27 RX ADMIN — ACETAMINOPHEN 650 MG: 325 TABLET, FILM COATED ORAL at 04:21

## 2019-04-27 RX ADMIN — POTASSIUM CHLORIDE 40 MEQ: 1500 TABLET, EXTENDED RELEASE ORAL at 12:47

## 2019-04-27 RX ADMIN — ENOXAPARIN SODIUM 40 MG: 100 INJECTION SUBCUTANEOUS at 04:21

## 2019-04-27 NOTE — CARE PLAN
Problem: Venous Thromboembolism (VTW)/Deep Vein Thrombosis (DVT) Prevention:  Goal: Patient will participate in Venous Thrombosis (VTE)/Deep Vein Thrombosis (DVT)Prevention Measures  Outcome: PROGRESSING AS EXPECTED  Patient ambulating in halls, medicated with lovenox per orders

## 2019-04-27 NOTE — DISCHARGE INSTRUCTIONS
Discharge Instructions    Discharged to home by car with relative. Discharged via wheelchair, hospital escort: Yes.  Special equipment needed: Not Applicable    Be sure to schedule a follow-up appointment with your primary care doctor or any specialists as instructed.     Discharge Plan:   Diet Plan: Discussed  Activity Level: Discussed  Confirmed Follow up Appointment: Patient to Call and Schedule Appointment  Confirmed Symptoms Management: Discussed  Medication Reconciliation Updated: Yes  Influenza Vaccine Indication: Patient Refuses (Already had per pt)    I understand that a diet low in cholesterol, fat, and sodium is recommended for good health. Unless I have been given specific instructions below for another diet, I accept this instruction as my diet prescription.   Other diet: regular    Special Instructions: None    · Is patient discharged on Warfarin / Coumadin?   No   Metabolic Acidosis  Metabolic acidosis is a condition in which there is too much acid in the blood. It happens because of a chemical imbalance in your cells. Metabolic acidosis can happen at any age, and there are many different causes. It may be a symptom of a sudden, short-lived (acute) condition, or of a lifelong (chronic) condition.  Metabolic acidosis can be mild, or it can be severe and life-threatening, depending on the cause. Metabolic acidosis can be corrected if the cause is identified and properly treated.  What are the causes?  There are many possible causes of metabolic acidosis. The most common causes are:  · The body producing too much acid.  · Losing too much of a chemical that balances acid levels (bicarbonate). This can result from diarrhea or from certain surgeries on the stomach and intestines.  · Excessive buildup of acidic substances (ketone bodies) in the blood due to untreated type 1 diabetes.  · Excessive buildup of a chemical (lactic acid) that forms when the body is low on oxygen. Lactic acid buildup can result  from:  ¨ Decreased flow of blood, oxygen, and nutrients to vital organs (shock).  ¨ Intense physical activity.  ¨ Disease.  ¨ Infection.  · Exposure to a poisonous substance (toxin), such as:  ¨ Carbon monoxide.  ¨ Cyanide.  ¨ Antifreeze (ethylene glycol).  ¨ Methanol.  · Certain medicines, such as acetazolamide or large doses of aspirin.  · Kidney disease or kidney infection.  · Too much iron in the body.  · Excessive alcohol use.  · Lack of healthy red blood cells (anemia).  What are the signs or symptoms?  Symptoms of this condition vary depending on the cause and severity. Common symptoms include:  · Rapid breathing.  · Difficulty breathing.  · Nausea or vomiting.  · Headache.  · Confusion.  · Weakness.  · Feeling restless, drowsy, and emotionless (lethargy).  Mild acidosis may not cause any symptoms. Very severe acidosis can result in shock, coma, or death.  How is this diagnosed?  This condition is diagnosed based on a physical exam and your medical history. You may have tests, such as:  · Blood tests. These may include:  ¨ An arterial blood gas (ABG) test or a venous blood gas (VBG) test. These tests measure the acidity levels (pH balance) of your blood.  ¨ A serum electrolytes test. This test measures the amounts of minerals in your blood.  · Urine tests.  How is this treated?  Treatment for metabolic acidosis depends on the underlying condition and the severity of symptoms. The goal of treatment is to balance the amount of acid in the body and to treat the underlying cause of metabolic acidosis. Mild metabolic acidosis can be treated by fluids given through an IV tube. Severe forms of metabolic acidosis require hospitalization and medicines to help treat the underlying problem. Bicarbonates may be needed if your condition is very severe.  Follow these instructions at home:  · Take over-the-counter and prescription medicines only as told by your health care provider.  · If you were prescribed an antibiotic  medicine, take it as told by your health care provider. Do not stop taking the antibiotic even if you start to feel better.  · Drink enough fluid to keep your urine clear or pale yellow.  · Follow instructions from your health care provider about eating or drinking restrictions.  · Pay attention to your symptoms and any changes in your symptoms.  · Keep all follow-up visits as told by your health care provider. This is important.  How is this prevented?  Take these actions to prevent metabolic acidosis:  · Manage any chronic conditions you have.  · Avoid exposure to toxins.  · Avoid drinking excessive amounts of alcohol.  · Understand your medicines and any supplements you take, and their possible side effects.  · Watch for any symptoms of metabolic acidosis to develop.  Contact a health care provider if:  · You have any new symptoms.  · You have side effects from medicines you are taking.  · You have nausea, vomiting, or diarrhea that does not get better with medicine or that gets worse.  · You have body aches or lethargy that gets worse.  · You have dark urine or you urinate less often than you usually do.  · You have a decreased appetite.  · You have a fever.  Get help right away if:  · You have shortness of breath, chest pain, or a fast heartbeat (palpitations).  · You feel like you are losing consciousness, or you faint.  · You feel drowsy or confused.  · You have severe pain in your joints and limbs.  · You have severe abdominal pain.  These symptoms may represent a serious problem that is an emergency. Do not wait to see if the symptoms will go away. Get medical help right away. Call your local emergency services (911 in the [place] Do not drive yourself to the hospital.   This information is not intended to replace advice given to you by your health care provider. Make sure you discuss any questions you have with your health care provider.  Document Released: 04/03/2012 Document Revised: 05/22/2017 Document  Reviewed: 05/04/2016  [employer] Interactive Patient Education © 2017 [employer]      Depression / Suicide Risk    As you are discharged from this [facility] facility, it is important to learn how to keep safe from harming yourself.    Recognize the warning signs:  · Abrupt changes in personality, positive or negative- including increase in energy   · Giving away possessions  · Change in eating patterns- significant weight changes-  positive or negative  · Change in sleeping patterns- unable to sleep or sleeping all the time   · Unwillingness or inability to communicate  · Depression  · Unusual sadness, discouragement and loneliness  · Talk of wanting to die  · Neglect of personal appearance   · Rebelliousness- reckless behavior  · Withdrawal from people/activities they love  · Confusion- inability to concentrate     If you or a loved one observes any of these behaviors or has concerns about self-harm, here's what you can do:  · Talk about it- your feelings and reasons for harming yourself  · Remove any means that you might use to hurt yourself (examples: pills, rope, extension cords, firearm)  · Get professional help from the community (Mental Health, Substance Abuse, psychological counseling)  · Do not be alone:Call your Safe Contact- someone whom you trust who will be there for you.  · Call your local CRISIS HOTLINE 154-5422 or 587-625-9283  · Call your local Children's Mobile Crisis Response Team [place] (219) 766-7549 or www.[employer]  · Call the toll free National Suicide Prevention Hotlines   · National Suicide Prevention Lifeline 723-799-HVIF (8746)  · National Hope Line Network 800-SUICIDE (036-6597)
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
## 2019-05-08 ENCOUNTER — OFFICE VISIT (OUTPATIENT)
Dept: INTERNAL MEDICINE | Facility: MEDICAL CENTER | Age: 44
End: 2019-05-08
Payer: MEDICAID

## 2019-05-08 VITALS
DIASTOLIC BLOOD PRESSURE: 74 MMHG | HEIGHT: 69 IN | HEART RATE: 77 BPM | BODY MASS INDEX: 28.14 KG/M2 | OXYGEN SATURATION: 96 % | WEIGHT: 190 LBS | SYSTOLIC BLOOD PRESSURE: 110 MMHG | TEMPERATURE: 97.3 F

## 2019-05-08 DIAGNOSIS — Z00.00 HEALTHCARE MAINTENANCE: ICD-10-CM

## 2019-05-08 DIAGNOSIS — R74.8 HIGH SERUM OSMOLAR GAP: ICD-10-CM

## 2019-05-08 DIAGNOSIS — D72.829 LEUKOCYTOSIS, UNSPECIFIED TYPE: ICD-10-CM

## 2019-05-08 DIAGNOSIS — R73.09 ELEVATED GLUCOSE: ICD-10-CM

## 2019-05-08 DIAGNOSIS — Z80.3 FAMILY HISTORY OF BREAST CANCER: ICD-10-CM

## 2019-05-08 DIAGNOSIS — E87.6 HYPOKALEMIA: ICD-10-CM

## 2019-05-08 DIAGNOSIS — E87.29 HIGH ANION GAP METABOLIC ACIDOSIS: ICD-10-CM

## 2019-05-08 DIAGNOSIS — F51.01 PRIMARY INSOMNIA: ICD-10-CM

## 2019-05-08 DIAGNOSIS — Z12.31 BREAST CANCER SCREENING BY MAMMOGRAM: ICD-10-CM

## 2019-05-08 DIAGNOSIS — F41.9 ANXIETY: ICD-10-CM

## 2019-05-08 DIAGNOSIS — R79.89 ABNORMAL LFTS: ICD-10-CM

## 2019-05-08 DIAGNOSIS — E66.3 OVERWEIGHT: ICD-10-CM

## 2019-05-08 PROBLEM — R41.0 DISORIENTATION: Status: RESOLVED | Noted: 2019-03-17 | Resolved: 2019-05-08

## 2019-05-08 PROBLEM — R44.3 HALLUCINATIONS: Status: RESOLVED | Noted: 2019-03-17 | Resolved: 2019-05-08

## 2019-05-08 PROCEDURE — 99214 OFFICE O/P EST MOD 30 MIN: CPT | Mod: GC | Performed by: INTERNAL MEDICINE

## 2019-05-08 ASSESSMENT — ENCOUNTER SYMPTOMS
SHORTNESS OF BREATH: 0
VOMITING: 0
COUGH: 0
DOUBLE VISION: 0
NERVOUS/ANXIOUS: 1
POLYDIPSIA: 0
CONSTIPATION: 0
PALPITATIONS: 0
MYALGIAS: 0
DIARRHEA: 0
FEVER: 0
NAUSEA: 0
HEADACHES: 0
DEPRESSION: 0
SINUS PAIN: 0
CHILLS: 0
FALLS: 0
DIZZINESS: 0
BLURRED VISION: 0
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
## 2019-05-24 NOTE — ADDENDUM NOTE
Encounter addended by: Jeremy M Gonda, M.D. on: 5/24/2019  2:58 AM<BR>    Actions taken: Charge Capture section accepted, Sign clinical note

## 2020-07-23 ENCOUNTER — NURSE TRIAGE (OUTPATIENT)
Dept: HEALTH INFORMATION MANAGEMENT | Facility: OTHER | Age: 45
End: 2020-07-23

## 2020-07-23 NOTE — TELEPHONE ENCOUNTER
1. Caller Name: Chula Uribe                 Call Back Number: [phone]  [facility] PCP or Specialty Provider: Yes         2.  In the last two weeks, has the patient had any new or worsening symptoms (not explained by alternative diagnosis)? Yes, the patient reports the following COVID-19 consistent symptoms: chills, fatigue, nausea, vomiting and diarrhea.    3.  Does patient have any comoribidities? None     4.  Has the patient traveled in the last 14 days OR had any known contact with someone who is suspected or confirmed to have COVID-19?  Yes, person @ work who had symptoms    5. POTENTIAL PUI (LOW): Offered virtual visit to limit potential exposure to others; patient also given self care instructions        Note routed to [facility] Provider: JD only.

## 2020-07-24 ENCOUNTER — TELEMEDICINE (OUTPATIENT)
Dept: MEDICAL GROUP | Facility: IMAGING CENTER | Age: 45
End: 2020-07-24
Payer: COMMERCIAL

## 2020-07-24 VITALS — BODY MASS INDEX: 30.82 KG/M2 | WEIGHT: 185 LBS | HEIGHT: 65 IN

## 2020-07-24 DIAGNOSIS — Z20.822 SUSPECTED COVID-19 VIRUS INFECTION: ICD-10-CM

## 2020-07-24 DIAGNOSIS — Z13.0 SCREENING FOR DEFICIENCY ANEMIA: ICD-10-CM

## 2020-07-24 DIAGNOSIS — Z13.220 SCREENING FOR HYPERLIPIDEMIA: ICD-10-CM

## 2020-07-24 DIAGNOSIS — Z76.89 ENCOUNTER TO ESTABLISH CARE WITH NEW DOCTOR: ICD-10-CM

## 2020-07-24 DIAGNOSIS — Z12.39 BREAST SCREENING: ICD-10-CM

## 2020-07-24 DIAGNOSIS — Z13.1 SCREENING FOR DIABETES MELLITUS (DM): ICD-10-CM

## 2020-07-24 PROCEDURE — 99203 OFFICE O/P NEW LOW 30 MIN: CPT | Mod: 95,CR | Performed by: NURSE PRACTITIONER

## 2020-07-24 RX ORDER — AMOXICILLIN 500 MG/1
CAPSULE ORAL
COMMUNITY
Start: 2020-07-21 | End: 2021-01-18

## 2020-07-24 RX ORDER — IBUPROFEN 800 MG/1
TABLET ORAL
COMMUNITY
Start: 2020-07-21 | End: 2021-01-18

## 2020-07-24 ASSESSMENT — FIBROSIS 4 INDEX: FIB4 SCORE: 1.76

## 2020-07-24 ASSESSMENT — PAIN SCALES - GENERAL: PAINLEVEL: NO PAIN

## 2020-07-24 NOTE — LETTER
July 24, 2020    Chula Uribe  [address] [place] [address]        To whom it may concern:    Please excuse Chula Uribe from work from 7/20/2020 and until she has COVID-19 testing results. It is my recommendation for Chula to continue social isolation and distancing until she has her COVID-19 testing results return. She may work from home if she feels she is able to due to ongoing viral symptoms.    If you have any questions or concerns, please don't hesitate to call.    Sincerely,    NORBERT Valverde.    Electronically Signed

## 2020-07-24 NOTE — PROGRESS NOTES
"Telemedicine Visit: New Patient   This encounter was conducted via Zoom .   Verbal consent was obtained. Patient's identity was verified.  Patient is aware that this is a billable encounter.  Subjective:   CC:   Chief Complaint   Patient presents with   • [facility]     no prior PCP.    • Other     monday - tooth taken out dentist, one amoxicillin, and motrin.  tuesday, walked dog, fatigue, nauseous, vomiting to wednesday. wednesday no vomit. nauseous and thursday nauseous. chills. middle of the night, wake up middle of the night, cough. feeling better.    • Other     pap smear - been a while. mammogram.   • Referral Needed     mmg     Chula Uribe is a 45 y.o. female presenting to establish care. Reports medical history of anxiety, insomnia, and elevated glucose. Presents today to discuss the evaluation and management of ongoing viral symptoms.    States she had a tooth pulled on Monday July 20 th without complication. States she is currently taking Amoxicillin. States she was taking Ibuprofen for pain, but has stopped due to ongoing viral symptoms. States she has a positive sick contact at work that is currently out awaiting for COVID-19 results since July 17th. States she is currently working from home due to symptoms. States on Tuesday July 21st she became nauseated and fatigue. States on Wednesday she started vomiting and having worse fatigue. States on Thursday she started to have chills, developed a dry cough, and continued to have nausea. States today she continues to have nausea, \"hot and cold\" feeling, diarrhea, and left ear pain. States she does not have a thermometer at home and is unaware if she has or has had a fever, but feels like she has a fever. Denies rash, runny nose, nasal congestion, sore throat, and vomiting. States at beginning of illness she had a sore throat, but unable to remember which day. States she is feeling better. States she is tolerating eating better. "     ROS:  Constitutional: Denies fever, night sweats, weight loss/gain and/or malaise. Chills and fatigue, see HPI.  HENT: Denies nasal congestion, hearing loss, enlarged thyroid, or difficulty swallowing. Sore throat and left ear pain, see HPI.  Eyes: Denies changes in vision, pain. Wears corrective wear.   Respiratory: Denies SOB at rest or activity.  Dry cough, see HPI.  Cardiovascular: Denies tachycardia, chest pain, palpitations, or leg swelling.   Gastrointestinal: Denies constipation, abdominal pain, loss appetite, reflux, or hematochezia. N/V/D, see HPI.  Genitourinary: Denies difficulty voiding, dysuria, nocturia, or hematuria.   Skin: Negative for rash or worrisome moles.   Neurological: Negative for dizziness, focal weakness and headaches.   Endo/Heme/Allergies: Denies bruise/bleed easily, allergies.   Psychiatric/Behavioral: Denies depression. History of depression and difficulty sleeping.    No Known Allergies    Current medicines (including changes today)  Current Outpatient Medications   Medication Sig Dispense Refill   • amoxicillin (AMOXIL) 500 MG Cap TAKE 2 CAPSULES BY MOUTH NOW AND 1 TABLET THREE TIMES DAILY UNTIL GONE     • ibuprofen (MOTRIN) 800 MG Tab TAKE 1 TABLET BY MOUTH THREE TIMES DAILY AS NEEDED FOR DENTAL PAIN. DO NOT EXCEED 3 TABLETS IN 24 HOURS. NO ALCOHOL.       No current facility-administered medications for this visit.      She  has a past medical history of Anxiety.  She  has no past surgical history on file.    Family History   Problem Relation Age of Onset   • Osteoporosis Mother    • Hyperlipidemia Mother    • Cancer Father         Brain tumor   • Kidney Disease Father    • No Known Problems Sister    • Dementia Maternal Grandmother         alzheimers   • Genetic Disorder Sister         down syndrome   • Breast Cancer Maternal Aunt         Triple negative breast cancer diagnosed in 60s.   • Parkinson's Disease Maternal Aunt    • No Known Problems Sister    • No Known Problems  "Sister      Family Status   Relation Name Status   • Mo  Alive   • Fa     • Sis  Alive   • MGMo  Alive   • MGFa     • PGMo     • PGFa  Alive   • Sis  Alive   • MAunt  (Not Specified)     Patient Active Problem List    Diagnosis Date Noted   • High serum osmolar gap 2019     Priority: High   • Primary insomnia 2019   • Anxiety 2019   • Elevated glucose 2019   • Abnormal LFTs 2019      Objective:   Ht 1.651 m (5' 5\")   Wt 83.9 kg (185 lb)   BMI 30.79 kg/m²   Respiratory rate observed during visit: 16 bpm    Physical Exam:  Constitutional: Alert, no distress, well-groomed.  Skin: No rashes in visible areas.  Eye: Round. Conjunctiva clear, lids normal. No icterus.   ENMT: Lips pink without lesions, good dentition, moist mucous membranes. Phonation normal.  Neck: No masses, no thyromegaly. Moves freely without pain.  CV: Pulse as reported by patient  Respiratory: Unlabored respiratory effort, no cough or audible wheeze  Psych: Alert and oriented x3, normal affect and mood.     Assessment and Plan:   1. Encounter to establish care with new doctor  2. Breast screening  3. Screening for diabetes mellitus (DM)  4. Screening for deficiency anemia  5. Screening for hyperlipidemia  Reviewed with patient medication use and side effects. Medical, past, surgical history reviewed with patient. Discussed with patient the risk and benefits of receiving vaccines. Discussed CDC recommendations for immunizations and USPSTF guidelines for screening exams.  Discussed receiving Tdap next time she is in the clinic, verbalized understanding. Encouraged patient to wash hands regularly and avoid sick contacts while supporting immune system.  Instructed to schedule an annual physical with a pap smear.  Discussed preventive screening labs with patient, verbalized understanding. Will evaluate plan of care once labs are obtained.    - Lipid Profile; Future  - [id number]; Future  - " Comp Metabolic Panel; Future  - CBC WITH DIFFERENTIAL; Future    6. Suspected COVID-19 virus infection  This is a stable condition. Discussed with patient illness is viral in nature. Encouraged to increase or maintain hydration. Discussed drinking room temperature water and/or hot teas with slippery elm, licorice root and/or marshmallow root to soothe throat and cough, verbalized understanding. Discussed avoiding cold water at this time, verbalized understanding. Instructed to gargling with warm salt water up to TID, verbalized understanding. Discussed eating BRAT diet until she is no longer having diarrhea and/or nausea, verbalized understanding. RTC if symptoms worsen or develops fever.  Discussed seeking emergency services if she experiences worse symptoms.  Discussed with patient due to symptoms and positive sick contact that is suspected to have COVID-19 patient is to remain at home social distancing and isolating as well as any family members that she lives with until she can get COVID-19 testing and results back. Letter written excusing patient from working in her office, may work from home as tolerated.  Discussed not taking Ibuprofen until symptoms improve due to current reports taking Ibuprofen regularly can complicate COVID-19 infection.     - Miscellaneous Test    Follow-up: Return if symptoms worsen or fail to improve.

## 2020-07-27 ENCOUNTER — HOSPITAL ENCOUNTER (OUTPATIENT)
Dept: LAB | Facility: MEDICAL CENTER | Age: 45
End: 2020-07-27
Attending: NURSE PRACTITIONER
Payer: COMMERCIAL

## 2020-07-27 LAB
COVID ORDER STATUS COVID19: NORMAL
SARS-COV-2 RNA RESP QL NAA+PROBE: NOTDETECTED
SPECIMEN SOURCE: NORMAL

## 2020-07-27 PROCEDURE — U0003 INFECTIOUS AGENT DETECTION BY NUCLEIC ACID (DNA OR RNA); SEVERE ACUTE RESPIRATORY SYNDROME CORONAVIRUS 2 (SARS-COV-2) (CORONAVIRUS DISEASE [COVID-19]), AMPLIFIED PROBE TECHNIQUE, MAKING USE OF HIGH THROUGHPUT TECHNOLOGIES AS DESCRIBED BY CMS-2020-01-R: HCPCS

## 2020-07-27 PROCEDURE — C9803 HOPD COVID-19 SPEC COLLECT: HCPCS

## 2021-01-18 ENCOUNTER — OFFICE VISIT (OUTPATIENT)
Dept: MEDICAL GROUP | Facility: IMAGING CENTER | Age: 46
End: 2021-01-18
Payer: COMMERCIAL

## 2021-01-18 VITALS
OXYGEN SATURATION: 97 % | DIASTOLIC BLOOD PRESSURE: 62 MMHG | HEART RATE: 74 BPM | BODY MASS INDEX: 29.22 KG/M2 | WEIGHT: 175.4 LBS | HEIGHT: 65 IN | RESPIRATION RATE: 14 BRPM | TEMPERATURE: 98.8 F | SYSTOLIC BLOOD PRESSURE: 112 MMHG

## 2021-01-18 DIAGNOSIS — Z30.09 BIRTH CONTROL COUNSELING: ICD-10-CM

## 2021-01-18 DIAGNOSIS — Z00.00 WELLNESS EXAMINATION: ICD-10-CM

## 2021-01-18 DIAGNOSIS — Z23 NEED FOR VACCINATION: ICD-10-CM

## 2021-01-18 DIAGNOSIS — F41.9 ANXIETY: ICD-10-CM

## 2021-01-18 PROCEDURE — 99396 PREV VISIT EST AGE 40-64: CPT | Performed by: NURSE PRACTITIONER

## 2021-01-18 RX ORDER — HYDROXYZINE HYDROCHLORIDE 25 MG/1
25 TABLET, FILM COATED ORAL
Qty: 60 TAB | Refills: 0 | Status: SHIPPED | OUTPATIENT
Start: 2021-01-18 | End: 2021-02-03 | Stop reason: SDUPTHER

## 2021-01-18 SDOH — HEALTH STABILITY: PHYSICAL HEALTH: ON AVERAGE, HOW MANY DAYS PER WEEK DO YOU ENGAGE IN MODERATE TO STRENUOUS EXERCISE (LIKE A BRISK WALK)?: 5 DAYS

## 2021-01-18 SDOH — HEALTH STABILITY: PHYSICAL HEALTH: ON AVERAGE, HOW MANY MINUTES DO YOU ENGAGE IN EXERCISE AT THIS LEVEL?: 60 MIN

## 2021-01-18 ASSESSMENT — ANXIETY QUESTIONNAIRES
5. BEING SO RESTLESS THAT IT IS HARD TO SIT STILL: NEARLY EVERY DAY
3. WORRYING TOO MUCH ABOUT DIFFERENT THINGS: NEARLY EVERY DAY
GAD7 TOTAL SCORE: 18
2. NOT BEING ABLE TO STOP OR CONTROL WORRYING: MORE THAN HALF THE DAYS
1. FEELING NERVOUS, ANXIOUS, OR ON EDGE: NEARLY EVERY DAY
7. FEELING AFRAID AS IF SOMETHING AWFUL MIGHT HAPPEN: MORE THAN HALF THE DAYS
4. TROUBLE RELAXING: NEARLY EVERY DAY
6. BECOMING EASILY ANNOYED OR IRRITABLE: MORE THAN HALF THE DAYS

## 2021-01-18 ASSESSMENT — PAIN SCALES - GENERAL: PAINLEVEL: NO PAIN

## 2021-01-18 ASSESSMENT — FIBROSIS 4 INDEX: FIB4 SCORE: 1.76

## 2021-01-18 ASSESSMENT — PATIENT HEALTH QUESTIONNAIRE - PHQ9: CLINICAL INTERPRETATION OF PHQ2 SCORE: 0

## 2021-01-19 NOTE — PROGRESS NOTES
Subjective:   CC:   Chief Complaint   Patient presents with   • Gynecologic Exam   • Contraception     discuss birth control options      HPI:   Chula Uribe is a 45 y.o. female who presents for annual exam. She is feeling well.    Reports that she would like to discuss birth control options.  States that she recently has become sexually active again and would like a birth control option.  States that she has tried oral birth control in the past and would forget to take them.  States that she has tried Depo injection and Nexplanon and did not tolerate this due to weight gain and intermittent bleeding.  States she is interested and possibly getting her Mirena IUD at this time.  States that she needs a referral to OB/GYN due to not having one at this time.    States that she has been experiencing increased anxiety due to work.  States that she is having difficulty sleeping and she is not sleeping for more than 2 to 3 hours without waking up frequently.  States that her insomnia is worsening her anxiety.  States that she would like a referral to counseling and possibly starting medication to help her sleep due to her anxiety.    Depression Screening  Little interest or pleasure in doing things?  0 - not at all  Feeling down, depressed , or hopeless? 0 - not at all  Patient Health Questionnaire Score: 0    If depressive symptoms identified deferred to follow up visit unless specifically addressed in assesment and plan.    Interpretation of PHQ-9 Total Score   Score Severity   1-4 Minimal Depression   5-9 Mild Depression   10-14 Moderate Depression   15-19 Moderately Severe Depression   20-27 Severe Depression    FISH-7 Questionnaire  Feeling nervous, anxious, or on edge: Nearly every day  Not being able to sop or control worrying: More than half the days  Worrying too much about different things: Nearly every day  Trouble relaxing: Nearly every day  Being so restless that it's hard to sit still: Nearly every  day  Becoming easily annoyed or irritable: More than half the days  Feeling afraid as if something awful might happen: More than half the days  Total: 18    Interpretation of FISH 7 Total Score   Score Severity :  0-4 No Anxiety   5-9 Mild Anxiety  10-14 Moderate Anxiety  15-21 Severe Anxiety    Ob-Gyn/ History:    Patient has GYN provider: No.  /Para: 5/5  Last Pap Smear: Multiple years ago.  No history of abnormal pap smears.  Gyn Surgery: None.  Current Contraceptive Method: Condoms.  Yes, currently sexually active.  Last menstrual period: .  Periods regular. moderate bleeding, 3 days. Cramping is no.   She does not take OTC analgesics for cramps.  No significant bloating/fluid retention, pelvic pain, or dyspareunia. No vaginal discharge  Post-menopausal bleeding: N/A  Urinary incontinence:None  Folate intake: None    Health Maintenance  Advanced directive:  No. Discussed with patient what an Advance Directive is and what it consist of, as well as, the benefit of beginning  to explore this subject, verbalized understanding.  Osteoporosis Screen/ DEXA: N/A.  PT/vit D for falls prevention: N/A.  Cholesterol Screening: Ordered on 2020, patient encouraged to complete lab test.  Diabetes Screening: Ordered on 2020, patient encouraged to complete lab test.  Aspirin Use: N/A.  Diet: Well-balanced, no diet restrictions at this time.  Has lost 20 to 25 pounds since .  Exercise: Walking regularly 2 to 3 miles.  Substance Abuse: None  Safe in relationship.   Seat belts, bike helmet, gun safety discussed. No guns at home.  Sun protection used.    Cancer screening  Colorectal Cancer Screening: N/A.  Lung Cancer Screening: N/A.    Cervical Cancer Screening: Deferred at this time due to patient wanting a IUD and referral to OB/GYN given today for IUD placement and Pap smear.  Breast Cancer Screening: Declined at this time.  Patient planning on establishing care with OB/GYN and will complete  mammogram.    Infectious disease screening/Immunizations  --STI Screening: Declined, no concerns at this time.  --Practices safe sex.  --HIV Screening: Declined, no concerns at this time.  --Hepatitis C Screening: N/A.  --Immunizations:    Influenza: Discussed.   HPV: N/A.   Tetanus: Discussed.   Shingles: N/A.   Pneumococcal : N/A.    She  has a past medical history of Anxiety.  She  has no past surgical history on file.    Family History   Problem Relation Age of Onset   • Osteoporosis Mother    • Hyperlipidemia Mother    • Cancer Father         Brain tumor   • Kidney Disease Father    • No Known Problems Sister    • Dementia Maternal Grandmother         alzheimers   • Genetic Disorder Sister         down syndrome   • Breast Cancer Maternal Aunt         Triple negative breast cancer diagnosed in 60s.   • Parkinson's Disease Maternal Aunt    • No Known Problems Sister    • No Known Problems Sister      Social History     Socioeconomic History   • Marital status: Legally      Spouse name: Not on file   • Number of children: Not on file   • Years of education: Not on file   • Highest education level: Not on file   Occupational History   • Not on file   Social Needs   • Financial resource strain: Not on file   • Food insecurity     Worry: Not on file     Inability: Not on file   • Transportation needs     Medical: Not on file     Non-medical: Not on file   Tobacco Use   • Smoking status: Never Smoker   • Smokeless tobacco: Never Used   Substance and Sexual Activity   • Alcohol use: Not Currently   • Drug use: No   • Sexual activity: Yes     Partners: Male     Birth control/protection: Condom   Lifestyle   • Physical activity     Days per week: 5 days     Minutes per session: 60 min   • Stress: Not on file   Relationships   • Social connections     Talks on phone: Not on file     Gets together: Not on file     Attends [employer]ism service: Not on file     Active member of club or organization: Not on file      Attends meetings of clubs or organizations: Not on file     Relationship status: Not on file   • Intimate partner violence     Fear of current or ex partner: Not on file     Emotionally abused: Not on file     Physically abused: Not on file     Forced sexual activity: Not on file   Other Topics Concern   • Not on file   Social History Narrative   • Not on file     Patient Active Problem List    Diagnosis Date Noted   • High serum osmolar gap 03/17/2019     Priority: High   • Primary insomnia 05/08/2019   • Anxiety 05/08/2019   • Elevated glucose 05/08/2019   • Abnormal LFTs 03/17/2019     Current Outpatient Medications   Medication Sig Dispense Refill   • hydrOXYzine HCl (ATARAX) 25 MG Tab Take 1 Tab by mouth at bedtime as needed for Anxiety (May repeat dose times one if awake after one hour and/or wakes up in the night.). 60 Tab 0     No current facility-administered medications for this visit.      No Known Allergies    Review of Systems:  Constitutional: Denies fever, chills, fatigue, night sweats, weight loss/gain and/or malaise.  Intentional weight loss.    HENT: Denies nasal congestion, sore throat, hearing loss, enlarged thyroid, or difficulty swallowing.   Eyes: Denies changes in vision, pain. Wears corrective wear.   Respiratory: Denies cough, SOB at rest or activity.    Cardiovascular: Denies tachycardia, chest pain, palpitations, or leg swelling.   Gastrointestinal: Denies  N/V/D/C, abdominal pain, loss appetite, reflux, or hematochezia. Genitourinary: Denies difficulty voiding, dysuria, nocturia, or hematuria.   Skin: Negative for rash or worrisome moles.   Neurological: Negative for dizziness, focal weakness and headaches.   Endo/Heme/Allergies: Denies bruise/bleed easily, allergies.   Psychiatric/Behavioral:  History of depression.  Anxiety and difficulty sleeping, see HPI.    Objective:     /62 (BP Location: Left arm, Patient Position: Sitting, BP Cuff Size: Adult)   Pulse 74   Temp 37.1 °C  "(98.8 °F) (Temporal)   Resp 14   Ht 1.651 m (5' 5\")   Wt 79.6 kg (175 lb 6.4 oz)   LMP 12/28/2020   SpO2 97%   BMI 29.19 kg/m²   Body mass index is 29.19 kg/m².  Wt Readings from Last 4 Encounters:   01/18/21 79.6 kg (175 lb 6.4 oz)   07/24/20 83.9 kg (185 lb)   05/08/19 86.2 kg (190 lb)   04/27/19 83.2 kg (183 lb 6.8 oz)     Physical Exam:  General: Normal appearing. No distress.  HEENT: Normocephalic. Eyes conjunctiva clear lids without ptosis, PERRLA, ears normal shape and contour, canals are clear bilaterally, tympanic membranes pearly gray, intact, non-bulging, no drainage noted. Oral and nasal examine deferred due to current COVID-19 outbreak, no acute concerns. Patient wore a mask during visit.  Neck: Supple without JVD or abnormal masses. Small soft mobile thyroid palpated, no nodules palpated, non-tender.  Pulmonary: Clear to ausculation.  Normal effort. No rales, ronchi, or wheezing.  Cardiovascular: Regular rate and rhythm without murmur. Pedal and radial pulses are intact and equal bilaterally.  Abdomen: Soft, nontender, nondistended. Normal bowel sounds. Liver and spleen are not palpable.  Lymph: No cervical or supraclavicular lymph nodes are palpable  Skin: Warm and dry.  No obvious lesions. No rash noted.  Musculoskeletal: Normal gait. No extremity cyanosis, clubbing, or edema. Full AROM of neck and extremities.   Psych: Normal mood and affect. Judgment and insight is normal.  Breast: Deferred at this time  : Deferred at this time.    Neurological: Alert and oriented x 3. DTRs 2+/3 and symmetric. CN 2-12 grossly intact, Sensory intact. Negative Rhomberg.    Assessment and Plan:   1. Wellness examination  2. Need for vaccination  Reviewed with patient medication use and side effects. Medical, past, surgical history reviewed with patient. Discussed with patient the risk and benefits of receiving vaccines. Discussed CDC recommendations for immunizations and USPSTF guidelines for screening exams.  " Discussed receiving influenza and Tdap vaccine today, verbalized understanding and willingness. Encouraged patient to wash hands regularly and avoid sick contacts while supporting immune system.  Discussed continuing an accumulation of 150 minutes or more of moderate-intensity activity each week as tolerated. Discussed a healthy diet that is low in fat, sodium, and cholesterol, such as the mediterranean diet that is typically high in fruits, vegetables, whole grains, beans, nuts, and seeds, includes olive oil as an important source of monounsaturated fat, DASH diet, and/or also consider a plant-based diet.  Discussed the overall benefit of a well-balanced diet, regular exercise, and stress management, verbalized understanding.  Patient instructed to complete previously ordered labs, verbalized understanding willingness. Discussed current recommendations with patient for routine mammogram screening.  Discussed ACOG guidelines are starting at age 40 and annually.  Discussed USPSTF guidelines are starting at 50 years old and every 2 years, verbalized understanding.    -     Tdap =>6yo IM  -     Influenza Vaccine Quad Injection (PF)    Patient left before giving vaccine, notified to return to clinic for nurse visit if she would like to have vaccines given.     3. Anxiety  This is a chronic stable condition. Discussed and encouraged to practice breathing exercises when she identifies her anxiety increasing, verbalized understanding. Discussed the benefit of improving diet and exercise regimen,  verbalized understanding. Instructed to take hydroxyzine as needed for increase anxiety.  Instructed to take hydroxyzine 25 mg once as needed for increase anxiety, may repeat dose after one hour if she continues to feel anxious and/or having insomnia. Discussed possible side effects with patient, including sedation. Instructed to not take with other sedative medications or alcohol. Discussed seeking emergency services if she  experiences worse symptoms. Discussed referral to behavioral health for further evaluation, verbalized understanding and willingness to participation in referral.     -     REFERRAL TO BEHAVIORAL HEALTH  -     hydrOXYzine HCl (ATARAX) 25 MG Tab; Take 1 Tab by mouth at bedtime as needed for Anxiety (May repeat dose times one if awake after one hour and/or wakes up in the night.).    4. Birth control counseling  Discussed with patient possible birth controls.  Discussed with patient that PCP does not place IUDs and that she will need to be referred to OB/GYN as desired.  Discussed with patient not completing Pap smear at this time due to planning on establishing care with OB/GYN for IUD placement patient, verbalized understanding willingness to seek care with OB/GYN and to have Pap smear done at that time.  -     REFERRAL TO OB/GYN    Follow-up: Return in about 8 weeks (around 3/15/2021) for Mood.

## 2021-02-03 DIAGNOSIS — F41.9 ANXIETY: ICD-10-CM

## 2021-02-03 RX ORDER — HYDROXYZINE HYDROCHLORIDE 25 MG/1
50 TABLET, FILM COATED ORAL
Qty: 120 TAB | Refills: 0 | Status: SHIPPED | OUTPATIENT
Start: 2021-02-03 | End: 2021-03-24 | Stop reason: SDUPTHER

## 2021-02-04 NOTE — TELEPHONE ENCOUNTER
----- Message from Chula Uribe sent at 2/3/2021 11:29 AM PST -----  Regarding: RE:Vaccines  Contact: [phone]  Silas Mendoza,   I have an appointment with the mental health but not until the 17th of Feb.   Can you request a refill for the anxiety pills? They have been working but had to take 2, one did not do a thing.  Thank you in advance.....

## 2021-02-09 ENCOUNTER — HOSPITAL ENCOUNTER (OUTPATIENT)
Dept: LAB | Facility: MEDICAL CENTER | Age: 46
End: 2021-02-09
Attending: NURSE PRACTITIONER
Payer: COMMERCIAL

## 2021-02-09 DIAGNOSIS — Z13.0 SCREENING FOR DEFICIENCY ANEMIA: ICD-10-CM

## 2021-02-09 DIAGNOSIS — Z13.220 SCREENING FOR HYPERLIPIDEMIA: ICD-10-CM

## 2021-02-09 DIAGNOSIS — Z13.1 SCREENING FOR DIABETES MELLITUS (DM): ICD-10-CM

## 2021-02-09 LAB
ALBUMIN SERPL BCP-MCNC: 4.1 G/DL (ref 3.2–4.9)
ALBUMIN/GLOB SERPL: 1.2 G/DL
ALP SERPL-CCNC: 87 U/L (ref 30–99)
ALT SERPL-CCNC: 44 U/L (ref 2–50)
ANION GAP SERPL CALC-SCNC: 12 MMOL/L (ref 7–16)
AST SERPL-CCNC: 39 U/L (ref 12–45)
BASOPHILS # BLD AUTO: 1.4 % (ref 0–1.8)
BASOPHILS # BLD: 0.09 K/UL (ref 0–0.12)
BILIRUB SERPL-MCNC: 0.5 MG/DL (ref 0.1–1.5)
BUN SERPL-MCNC: 13 MG/DL (ref 8–22)
CALCIUM SERPL-MCNC: 9.4 MG/DL (ref 8.4–10.2)
CHLORIDE SERPL-SCNC: 104 MMOL/L (ref 96–112)
CHOLEST SERPL-MCNC: 165 MG/DL (ref 100–199)
CO2 SERPL-SCNC: 23 MMOL/L (ref 20–33)
CREAT SERPL-MCNC: 0.87 MG/DL (ref 0.5–1.4)
EOSINOPHIL # BLD AUTO: 0.19 K/UL (ref 0–0.51)
EOSINOPHIL NFR BLD: 2.9 % (ref 0–6.9)
ERYTHROCYTE [DISTWIDTH] IN BLOOD BY AUTOMATED COUNT: 55 FL (ref 35.9–50)
FASTING STATUS PATIENT QL REPORTED: NORMAL
GLOBULIN SER CALC-MCNC: 3.3 G/DL (ref 1.9–3.5)
GLUCOSE SERPL-MCNC: 86 MG/DL (ref 65–99)
HCT VFR BLD AUTO: 41.3 % (ref 37–47)
HDLC SERPL-MCNC: 64 MG/DL
HGB BLD-MCNC: 13.4 G/DL (ref 12–16)
IMM GRANULOCYTES # BLD AUTO: 0.02 K/UL (ref 0–0.11)
IMM GRANULOCYTES NFR BLD AUTO: 0.3 % (ref 0–0.9)
LDLC SERPL CALC-MCNC: 82 MG/DL
LYMPHOCYTES # BLD AUTO: 2.33 K/UL (ref 1–4.8)
LYMPHOCYTES NFR BLD: 35.5 % (ref 22–41)
MCH RBC QN AUTO: 30.7 PG (ref 27–33)
MCHC RBC AUTO-ENTMCNC: 32.4 G/DL (ref 33.6–35)
MCV RBC AUTO: 94.5 FL (ref 81.4–97.8)
MONOCYTES # BLD AUTO: 0.47 K/UL (ref 0–0.85)
MONOCYTES NFR BLD AUTO: 7.2 % (ref 0–13.4)
NEUTROPHILS # BLD AUTO: 3.46 K/UL (ref 2–7.15)
NEUTROPHILS NFR BLD: 52.7 % (ref 44–72)
NRBC # BLD AUTO: 0 K/UL
NRBC BLD-RTO: 0 /100 WBC
PLATELET # BLD AUTO: 264 K/UL (ref 164–446)
PMV BLD AUTO: 9.5 FL (ref 9–12.9)
POTASSIUM SERPL-SCNC: 4.1 MMOL/L (ref 3.6–5.5)
PROT SERPL-MCNC: 7.4 G/DL (ref 6–8.2)
RBC # BLD AUTO: 4.37 M/UL (ref 4.2–5.4)
SODIUM SERPL-SCNC: 139 MMOL/L (ref 135–145)
TRIGL SERPL-MCNC: 97 MG/DL (ref 0–149)
WBC # BLD AUTO: 6.6 K/UL (ref 4.8–10.8)

## 2021-02-09 PROCEDURE — 80061 LIPID PANEL: CPT

## 2021-02-09 PROCEDURE — 85025 COMPLETE CBC W/AUTO DIFF WBC: CPT

## 2021-02-09 PROCEDURE — 36415 COLL VENOUS BLD VENIPUNCTURE: CPT

## 2021-02-09 PROCEDURE — 80053 COMPREHEN METABOLIC PANEL: CPT

## 2021-03-08 ENCOUNTER — APPOINTMENT (OUTPATIENT)
Dept: RADIOLOGY | Facility: MEDICAL CENTER | Age: 46
End: 2021-03-08
Attending: NURSE PRACTITIONER
Payer: COMMERCIAL

## 2021-03-08 DIAGNOSIS — Z12.31 VISIT FOR SCREENING MAMMOGRAM: ICD-10-CM

## 2021-03-24 DIAGNOSIS — F41.9 ANXIETY: ICD-10-CM

## 2021-03-24 RX ORDER — HYDROXYZINE HYDROCHLORIDE 25 MG/1
50 TABLET, FILM COATED ORAL
Qty: 120 TABLET | Refills: 0 | Status: ON HOLD | OUTPATIENT
Start: 2021-03-24 | End: 2021-04-21

## 2021-03-24 NOTE — TELEPHONE ENCOUNTER
Received request via: Patient    Was the patient seen in the last year in this department? Yes    Does the patient have an active prescription (recently filled or refills available) for medication(s) requested? No     Patient scheduled to follow-up today 3/24/21.

## 2021-04-20 ENCOUNTER — APPOINTMENT (OUTPATIENT)
Dept: RADIOLOGY | Facility: MEDICAL CENTER | Age: 46
DRG: 918 | End: 2021-04-20
Attending: EMERGENCY MEDICINE
Payer: COMMERCIAL

## 2021-04-20 ENCOUNTER — HOSPITAL ENCOUNTER (INPATIENT)
Facility: MEDICAL CENTER | Age: 46
LOS: 1 days | DRG: 918 | End: 2021-04-21
Attending: EMERGENCY MEDICINE | Admitting: STUDENT IN AN ORGANIZED HEALTH CARE EDUCATION/TRAINING PROGRAM
Payer: COMMERCIAL

## 2021-04-20 PROBLEM — R78.0 ELEVATED ETOH LEVEL: Status: ACTIVE | Noted: 2021-04-20

## 2021-04-20 PROBLEM — T50.902A INTENTIONAL DRUG OVERDOSE (HCC): Status: ACTIVE | Noted: 2021-04-20

## 2021-04-20 PROBLEM — R73.9 HYPERGLYCEMIA: Status: ACTIVE | Noted: 2021-04-20

## 2021-04-20 PROBLEM — R00.0 TACHYCARDIA: Status: ACTIVE | Noted: 2021-04-20

## 2021-04-20 LAB
ALBUMIN SERPL BCP-MCNC: 4.1 G/DL (ref 3.2–4.9)
ALBUMIN/GLOB SERPL: 1.5 G/DL
ALP SERPL-CCNC: 109 U/L (ref 30–99)
ALT SERPL-CCNC: 37 U/L (ref 2–50)
AMPHET UR QL SCN: NEGATIVE
ANION GAP SERPL CALC-SCNC: 14 MMOL/L (ref 7–16)
APAP SERPL-MCNC: <5 UG/ML (ref 10–30)
APPEARANCE UR: CLEAR
APTT PPP: 24.5 SEC (ref 24.7–36)
AST SERPL-CCNC: 34 U/L (ref 12–45)
BARBITURATES UR QL SCN: NEGATIVE
BASOPHILS # BLD AUTO: 0.9 % (ref 0–1.8)
BASOPHILS # BLD: 0.08 K/UL (ref 0–0.12)
BENZODIAZ UR QL SCN: NEGATIVE
BILIRUB SERPL-MCNC: 0.2 MG/DL (ref 0.1–1.5)
BILIRUB UR QL STRIP.AUTO: NEGATIVE
BUN SERPL-MCNC: 14 MG/DL (ref 8–22)
BZE UR QL SCN: NEGATIVE
CALCIUM SERPL-MCNC: 8.4 MG/DL (ref 8.5–10.5)
CANNABINOIDS UR QL SCN: NEGATIVE
CHLORIDE SERPL-SCNC: 101 MMOL/L (ref 96–112)
CO2 SERPL-SCNC: 20 MMOL/L (ref 20–33)
COLOR UR: YELLOW
CREAT SERPL-MCNC: 1.01 MG/DL (ref 0.5–1.4)
EKG IMPRESSION: NORMAL
EOSINOPHIL # BLD AUTO: 0.15 K/UL (ref 0–0.51)
EOSINOPHIL NFR BLD: 1.8 % (ref 0–6.9)
ERYTHROCYTE [DISTWIDTH] IN BLOOD BY AUTOMATED COUNT: 49.8 FL (ref 35.9–50)
EST. AVERAGE GLUCOSE BLD GHB EST-MCNC: 94 MG/DL
ETHANOL BLD-MCNC: 121 MG/DL (ref 0–10)
GLOBULIN SER CALC-MCNC: 2.8 G/DL (ref 1.9–3.5)
GLUCOSE SERPL-MCNC: 121 MG/DL (ref 65–99)
GLUCOSE UR STRIP.AUTO-MCNC: NEGATIVE MG/DL
HBA1C MFR BLD: 4.9 % (ref 4–5.6)
HCG SERPL QL: NEGATIVE
HCT VFR BLD AUTO: 37 % (ref 37–47)
HGB BLD-MCNC: 12.5 G/DL (ref 12–16)
IMM GRANULOCYTES # BLD AUTO: 0.04 K/UL (ref 0–0.11)
IMM GRANULOCYTES NFR BLD AUTO: 0.5 % (ref 0–0.9)
INR PPP: 0.94 (ref 0.87–1.13)
KETONES UR STRIP.AUTO-MCNC: NEGATIVE MG/DL
LACTATE BLD-SCNC: 1.9 MMOL/L (ref 0.5–2)
LACTATE BLD-SCNC: 2.9 MMOL/L (ref 0.5–2)
LEUKOCYTE ESTERASE UR QL STRIP.AUTO: NEGATIVE
LYMPHOCYTES # BLD AUTO: 2.66 K/UL (ref 1–4.8)
LYMPHOCYTES NFR BLD: 31.5 % (ref 22–41)
MAGNESIUM SERPL-MCNC: 2.2 MG/DL (ref 1.5–2.5)
MCH RBC QN AUTO: 32.2 PG (ref 27–33)
MCHC RBC AUTO-ENTMCNC: 33.8 G/DL (ref 33.6–35)
MCV RBC AUTO: 95.4 FL (ref 81.4–97.8)
METHADONE UR QL SCN: NEGATIVE
MICRO URNS: ABNORMAL
MONOCYTES # BLD AUTO: 0.41 K/UL (ref 0–0.85)
MONOCYTES NFR BLD AUTO: 4.9 % (ref 0–13.4)
NEUTROPHILS # BLD AUTO: 5.1 K/UL (ref 2–7.15)
NEUTROPHILS NFR BLD: 60.4 % (ref 44–72)
NITRITE UR QL STRIP.AUTO: NEGATIVE
NRBC # BLD AUTO: 0 K/UL
NRBC BLD-RTO: 0 /100 WBC
OPIATES UR QL SCN: NEGATIVE
OSMOLALITY SERPL: 322 MOSM/KG H2O (ref 278–298)
OXYCODONE UR QL SCN: NEGATIVE
PCP UR QL SCN: NEGATIVE
PH UR STRIP.AUTO: >=9 [PH] (ref 5–8)
PLATELET # BLD AUTO: 284 K/UL (ref 164–446)
PMV BLD AUTO: 9.8 FL (ref 9–12.9)
POTASSIUM SERPL-SCNC: 3.5 MMOL/L (ref 3.6–5.5)
PROPOXYPH UR QL SCN: NEGATIVE
PROT SERPL-MCNC: 6.9 G/DL (ref 6–8.2)
PROT UR QL STRIP: NEGATIVE MG/DL
PROTHROMBIN TIME: 12.8 SEC (ref 12–14.6)
RBC # BLD AUTO: 3.88 M/UL (ref 4.2–5.4)
RBC UR QL AUTO: NEGATIVE
SALICYLATES SERPL-MCNC: <1 MG/DL (ref 15–25)
SARS-COV-2 RNA RESP QL NAA+PROBE: NOTDETECTED
SODIUM SERPL-SCNC: 135 MMOL/L (ref 135–145)
SP GR UR STRIP.AUTO: 1.02
SPECIMEN SOURCE: NORMAL
TROPONIN T SERPL-MCNC: 7 NG/L (ref 6–19)
UROBILINOGEN UR STRIP.AUTO-MCNC: 0.2 MG/DL
WBC # BLD AUTO: 8.4 K/UL (ref 4.8–10.8)

## 2021-04-20 PROCEDURE — 82077 ASSAY SPEC XCP UR&BREATH IA: CPT

## 2021-04-20 PROCEDURE — 81003 URINALYSIS AUTO W/O SCOPE: CPT

## 2021-04-20 PROCEDURE — A9270 NON-COVERED ITEM OR SERVICE: HCPCS | Performed by: GENERAL PRACTICE

## 2021-04-20 PROCEDURE — 700105 HCHG RX REV CODE 258: Performed by: STUDENT IN AN ORGANIZED HEALTH CARE EDUCATION/TRAINING PROGRAM

## 2021-04-20 PROCEDURE — 85730 THROMBOPLASTIN TIME PARTIAL: CPT

## 2021-04-20 PROCEDURE — 700102 HCHG RX REV CODE 250 W/ 637 OVERRIDE(OP): Performed by: STUDENT IN AN ORGANIZED HEALTH CARE EDUCATION/TRAINING PROGRAM

## 2021-04-20 PROCEDURE — 700111 HCHG RX REV CODE 636 W/ 250 OVERRIDE (IP): Performed by: STUDENT IN AN ORGANIZED HEALTH CARE EDUCATION/TRAINING PROGRAM

## 2021-04-20 PROCEDURE — 80179 DRUG ASSAY SALICYLATE: CPT

## 2021-04-20 PROCEDURE — 99223 1ST HOSP IP/OBS HIGH 75: CPT | Performed by: STUDENT IN AN ORGANIZED HEALTH CARE EDUCATION/TRAINING PROGRAM

## 2021-04-20 PROCEDURE — 83605 ASSAY OF LACTIC ACID: CPT | Mod: 91

## 2021-04-20 PROCEDURE — U0003 INFECTIOUS AGENT DETECTION BY NUCLEIC ACID (DNA OR RNA); SEVERE ACUTE RESPIRATORY SYNDROME CORONAVIRUS 2 (SARS-COV-2) (CORONAVIRUS DISEASE [COVID-19]), AMPLIFIED PROBE TECHNIQUE, MAKING USE OF HIGH THROUGHPUT TECHNOLOGIES AS DESCRIBED BY CMS-2020-01-R: HCPCS

## 2021-04-20 PROCEDURE — 770020 HCHG ROOM/CARE - TELE (206)

## 2021-04-20 PROCEDURE — 96366 THER/PROPH/DIAG IV INF ADDON: CPT

## 2021-04-20 PROCEDURE — 700105 HCHG RX REV CODE 258: Performed by: EMERGENCY MEDICINE

## 2021-04-20 PROCEDURE — 85610 PROTHROMBIN TIME: CPT

## 2021-04-20 PROCEDURE — 93005 ELECTROCARDIOGRAM TRACING: CPT | Performed by: EMERGENCY MEDICINE

## 2021-04-20 PROCEDURE — 700101 HCHG RX REV CODE 250: Performed by: EMERGENCY MEDICINE

## 2021-04-20 PROCEDURE — 85025 COMPLETE CBC W/AUTO DIFF WBC: CPT

## 2021-04-20 PROCEDURE — 83930 ASSAY OF BLOOD OSMOLALITY: CPT

## 2021-04-20 PROCEDURE — 90791 PSYCH DIAGNOSTIC EVALUATION: CPT

## 2021-04-20 PROCEDURE — 700102 HCHG RX REV CODE 250 W/ 637 OVERRIDE(OP): Performed by: GENERAL PRACTICE

## 2021-04-20 PROCEDURE — 96376 TX/PRO/DX INJ SAME DRUG ADON: CPT

## 2021-04-20 PROCEDURE — 80307 DRUG TEST PRSMV CHEM ANLYZR: CPT

## 2021-04-20 PROCEDURE — 71045 X-RAY EXAM CHEST 1 VIEW: CPT

## 2021-04-20 PROCEDURE — 700111 HCHG RX REV CODE 636 W/ 250 OVERRIDE (IP): Performed by: EMERGENCY MEDICINE

## 2021-04-20 PROCEDURE — 96365 THER/PROPH/DIAG IV INF INIT: CPT

## 2021-04-20 PROCEDURE — 84484 ASSAY OF TROPONIN QUANT: CPT

## 2021-04-20 PROCEDURE — 51798 US URINE CAPACITY MEASURE: CPT

## 2021-04-20 PROCEDURE — 99223 1ST HOSP IP/OBS HIGH 75: CPT | Performed by: PSYCHIATRY & NEUROLOGY

## 2021-04-20 PROCEDURE — 99285 EMERGENCY DEPT VISIT HI MDM: CPT

## 2021-04-20 PROCEDURE — HZ2ZZZZ DETOXIFICATION SERVICES FOR SUBSTANCE ABUSE TREATMENT: ICD-10-PCS | Performed by: STUDENT IN AN ORGANIZED HEALTH CARE EDUCATION/TRAINING PROGRAM

## 2021-04-20 PROCEDURE — 84703 CHORIONIC GONADOTROPIN ASSAY: CPT

## 2021-04-20 PROCEDURE — 83735 ASSAY OF MAGNESIUM: CPT

## 2021-04-20 PROCEDURE — 80143 DRUG ASSAY ACETAMINOPHEN: CPT

## 2021-04-20 PROCEDURE — 80053 COMPREHEN METABOLIC PANEL: CPT

## 2021-04-20 PROCEDURE — U0005 INFEC AGEN DETEC AMPLI PROBE: HCPCS

## 2021-04-20 PROCEDURE — 83036 HEMOGLOBIN GLYCOSYLATED A1C: CPT

## 2021-04-20 PROCEDURE — 70450 CT HEAD/BRAIN W/O DYE: CPT

## 2021-04-20 PROCEDURE — A9270 NON-COVERED ITEM OR SERVICE: HCPCS | Performed by: STUDENT IN AN ORGANIZED HEALTH CARE EDUCATION/TRAINING PROGRAM

## 2021-04-20 PROCEDURE — 96372 THER/PROPH/DIAG INJ SC/IM: CPT

## 2021-04-20 PROCEDURE — 36415 COLL VENOUS BLD VENIPUNCTURE: CPT

## 2021-04-20 PROCEDURE — 96375 TX/PRO/DX INJ NEW DRUG ADDON: CPT

## 2021-04-20 RX ORDER — BISACODYL 10 MG
10 SUPPOSITORY, RECTAL RECTAL
Status: DISCONTINUED | OUTPATIENT
Start: 2021-04-20 | End: 2021-04-20

## 2021-04-20 RX ORDER — LORAZEPAM 2 MG/ML
0.5 INJECTION INTRAMUSCULAR ONCE
Status: COMPLETED | OUTPATIENT
Start: 2021-04-20 | End: 2021-04-20

## 2021-04-20 RX ORDER — ACETAMINOPHEN 325 MG/1
650 TABLET ORAL EVERY 6 HOURS PRN
Status: DISCONTINUED | OUTPATIENT
Start: 2021-04-20 | End: 2021-04-21 | Stop reason: HOSPADM

## 2021-04-20 RX ORDER — ONDANSETRON 4 MG/1
4 TABLET, ORALLY DISINTEGRATING ORAL EVERY 4 HOURS PRN
Status: DISCONTINUED | OUTPATIENT
Start: 2021-04-20 | End: 2021-04-21 | Stop reason: HOSPADM

## 2021-04-20 RX ORDER — LORAZEPAM 0.5 MG/1
0.5 TABLET ORAL EVERY 4 HOURS PRN
Status: DISCONTINUED | OUTPATIENT
Start: 2021-04-20 | End: 2021-04-21 | Stop reason: HOSPADM

## 2021-04-20 RX ORDER — LORAZEPAM 2 MG/ML
1 INJECTION INTRAMUSCULAR
Status: DISCONTINUED | OUTPATIENT
Start: 2021-04-20 | End: 2021-04-21 | Stop reason: HOSPADM

## 2021-04-20 RX ORDER — POLYETHYLENE GLYCOL 3350 17 G/17G
1 POWDER, FOR SOLUTION ORAL
Status: DISCONTINUED | OUTPATIENT
Start: 2021-04-20 | End: 2021-04-21 | Stop reason: HOSPADM

## 2021-04-20 RX ORDER — GAUZE BANDAGE 2" X 2"
100 BANDAGE TOPICAL DAILY
Status: DISCONTINUED | OUTPATIENT
Start: 2021-04-20 | End: 2021-04-21 | Stop reason: HOSPADM

## 2021-04-20 RX ORDER — METOPROLOL TARTRATE 1 MG/ML
5 INJECTION, SOLUTION INTRAVENOUS
Status: DISCONTINUED | OUTPATIENT
Start: 2021-04-20 | End: 2021-04-21 | Stop reason: HOSPADM

## 2021-04-20 RX ORDER — SODIUM CHLORIDE 9 MG/ML
1000 INJECTION, SOLUTION INTRAVENOUS ONCE
Status: COMPLETED | OUTPATIENT
Start: 2021-04-20 | End: 2021-04-20

## 2021-04-20 RX ORDER — AMOXICILLIN 250 MG
2 CAPSULE ORAL 2 TIMES DAILY
Status: DISCONTINUED | OUTPATIENT
Start: 2021-04-20 | End: 2021-04-20

## 2021-04-20 RX ORDER — LORAZEPAM 2 MG/1
4 TABLET ORAL
Status: DISCONTINUED | OUTPATIENT
Start: 2021-04-20 | End: 2021-04-21 | Stop reason: HOSPADM

## 2021-04-20 RX ORDER — LORAZEPAM 2 MG/ML
0.5 INJECTION INTRAMUSCULAR EVERY 4 HOURS PRN
Status: DISCONTINUED | OUTPATIENT
Start: 2021-04-20 | End: 2021-04-21 | Stop reason: HOSPADM

## 2021-04-20 RX ORDER — LORAZEPAM 2 MG/ML
1 INJECTION INTRAMUSCULAR ONCE
Status: COMPLETED | OUTPATIENT
Start: 2021-04-20 | End: 2021-04-20

## 2021-04-20 RX ORDER — BISACODYL 10 MG
10 SUPPOSITORY, RECTAL RECTAL
Status: DISCONTINUED | OUTPATIENT
Start: 2021-04-20 | End: 2021-04-21 | Stop reason: HOSPADM

## 2021-04-20 RX ORDER — LORAZEPAM 2 MG/ML
1.5 INJECTION INTRAMUSCULAR
Status: DISCONTINUED | OUTPATIENT
Start: 2021-04-20 | End: 2021-04-21 | Stop reason: HOSPADM

## 2021-04-20 RX ORDER — LORAZEPAM 2 MG/ML
2 INJECTION INTRAMUSCULAR
Status: DISCONTINUED | OUTPATIENT
Start: 2021-04-20 | End: 2021-04-21 | Stop reason: HOSPADM

## 2021-04-20 RX ORDER — PROCHLORPERAZINE EDISYLATE 5 MG/ML
5-10 INJECTION INTRAMUSCULAR; INTRAVENOUS EVERY 4 HOURS PRN
Status: DISCONTINUED | OUTPATIENT
Start: 2021-04-20 | End: 2021-04-21 | Stop reason: HOSPADM

## 2021-04-20 RX ORDER — PROMETHAZINE HYDROCHLORIDE 25 MG/1
12.5-25 TABLET ORAL EVERY 4 HOURS PRN
Status: DISCONTINUED | OUTPATIENT
Start: 2021-04-20 | End: 2021-04-21 | Stop reason: HOSPADM

## 2021-04-20 RX ORDER — ALBUTEROL SULFATE 90 UG/1
2 AEROSOL, METERED RESPIRATORY (INHALATION) EVERY 4 HOURS PRN
COMMUNITY
Start: 2021-03-31 | End: 2021-05-22

## 2021-04-20 RX ORDER — LORAZEPAM 1 MG/1
1 TABLET ORAL EVERY 4 HOURS PRN
Status: DISCONTINUED | OUTPATIENT
Start: 2021-04-20 | End: 2021-04-21 | Stop reason: HOSPADM

## 2021-04-20 RX ORDER — SODIUM CHLORIDE, SODIUM LACTATE, POTASSIUM CHLORIDE, CALCIUM CHLORIDE 600; 310; 30; 20 MG/100ML; MG/100ML; MG/100ML; MG/100ML
INJECTION, SOLUTION INTRAVENOUS CONTINUOUS
Status: DISCONTINUED | OUTPATIENT
Start: 2021-04-20 | End: 2021-04-20

## 2021-04-20 RX ORDER — POLYETHYLENE GLYCOL 3350 17 G/17G
1 POWDER, FOR SOLUTION ORAL
Status: DISCONTINUED | OUTPATIENT
Start: 2021-04-20 | End: 2021-04-20

## 2021-04-20 RX ORDER — FOLIC ACID 1 MG/1
1 TABLET ORAL DAILY
Status: DISCONTINUED | OUTPATIENT
Start: 2021-04-20 | End: 2021-04-21 | Stop reason: HOSPADM

## 2021-04-20 RX ORDER — LORAZEPAM 2 MG/1
2 TABLET ORAL
Status: DISCONTINUED | OUTPATIENT
Start: 2021-04-20 | End: 2021-04-21 | Stop reason: HOSPADM

## 2021-04-20 RX ORDER — AMOXICILLIN 250 MG
2 CAPSULE ORAL 2 TIMES DAILY PRN
Status: DISCONTINUED | OUTPATIENT
Start: 2021-04-20 | End: 2021-04-21 | Stop reason: HOSPADM

## 2021-04-20 RX ORDER — ONDANSETRON 2 MG/ML
4 INJECTION INTRAMUSCULAR; INTRAVENOUS EVERY 4 HOURS PRN
Status: DISCONTINUED | OUTPATIENT
Start: 2021-04-20 | End: 2021-04-21 | Stop reason: HOSPADM

## 2021-04-20 RX ORDER — CLONIDINE HYDROCHLORIDE 0.1 MG/1
0.1 TABLET ORAL EVERY 6 HOURS PRN
Status: DISCONTINUED | OUTPATIENT
Start: 2021-04-20 | End: 2021-04-21 | Stop reason: HOSPADM

## 2021-04-20 RX ORDER — PROMETHAZINE HYDROCHLORIDE 25 MG/1
12.5-25 SUPPOSITORY RECTAL EVERY 4 HOURS PRN
Status: DISCONTINUED | OUTPATIENT
Start: 2021-04-20 | End: 2021-04-21 | Stop reason: HOSPADM

## 2021-04-20 RX ORDER — ENALAPRILAT 1.25 MG/ML
1.25 INJECTION INTRAVENOUS EVERY 6 HOURS PRN
Status: DISCONTINUED | OUTPATIENT
Start: 2021-04-20 | End: 2021-04-21 | Stop reason: HOSPADM

## 2021-04-20 RX ADMIN — THIAMINE HYDROCHLORIDE: 100 INJECTION, SOLUTION INTRAMUSCULAR; INTRAVENOUS at 00:44

## 2021-04-20 RX ADMIN — LORAZEPAM 0.5 MG: 2 INJECTION INTRAMUSCULAR; INTRAVENOUS at 23:39

## 2021-04-20 RX ADMIN — SODIUM CHLORIDE, POTASSIUM CHLORIDE, SODIUM LACTATE AND CALCIUM CHLORIDE: 600; 310; 30; 20 INJECTION, SOLUTION INTRAVENOUS at 16:06

## 2021-04-20 RX ADMIN — LORAZEPAM 0.5 MG: 2 INJECTION INTRAMUSCULAR; INTRAVENOUS at 03:23

## 2021-04-20 RX ADMIN — LORAZEPAM 1 MG: 2 INJECTION INTRAMUSCULAR; INTRAVENOUS at 04:52

## 2021-04-20 RX ADMIN — Medication 100 MG: at 07:56

## 2021-04-20 RX ADMIN — THERA TABS 1 TABLET: TAB at 07:56

## 2021-04-20 RX ADMIN — ENOXAPARIN SODIUM 40 MG: 40 INJECTION SUBCUTANEOUS at 07:56

## 2021-04-20 RX ADMIN — LORAZEPAM 0.5 MG: 0.5 TABLET ORAL at 16:10

## 2021-04-20 RX ADMIN — SODIUM CHLORIDE, POTASSIUM CHLORIDE, SODIUM LACTATE AND CALCIUM CHLORIDE: 600; 310; 30; 20 INJECTION, SOLUTION INTRAVENOUS at 06:38

## 2021-04-20 RX ADMIN — LORAZEPAM 0.5 MG: 2 INJECTION INTRAMUSCULAR; INTRAVENOUS at 00:42

## 2021-04-20 RX ADMIN — SODIUM CHLORIDE 1000 ML: 9 INJECTION, SOLUTION INTRAVENOUS at 04:36

## 2021-04-20 RX ADMIN — FOLIC ACID 1 MG: 1 TABLET ORAL at 07:56

## 2021-04-20 RX ADMIN — LORAZEPAM 2 MG: 2 TABLET ORAL at 06:46

## 2021-04-20 ASSESSMENT — PATIENT HEALTH QUESTIONNAIRE - PHQ9
8. MOVING OR SPEAKING SO SLOWLY THAT OTHER PEOPLE COULD HAVE NOTICED. OR THE OPPOSITE, BEING SO FIGETY OR RESTLESS THAT YOU HAVE BEEN MOVING AROUND A LOT MORE THAN USUAL: NOT AT ALL
3. TROUBLE FALLING OR STAYING ASLEEP OR SLEEPING TOO MUCH: SEVERAL DAYS
3. TROUBLE FALLING OR STAYING ASLEEP OR SLEEPING TOO MUCH: SEVERAL DAYS
6. FEELING BAD ABOUT YOURSELF - OR THAT YOU ARE A FAILURE OR HAVE LET YOURSELF OR YOUR FAMILY DOWN: NOT AL ALL
5. POOR APPETITE OR OVEREATING: SEVERAL DAYS
9. THOUGHTS THAT YOU WOULD BE BETTER OFF DEAD, OR OF HURTING YOURSELF: NOT AT ALL
4. FEELING TIRED OR HAVING LITTLE ENERGY: NOT AT ALL
5. POOR APPETITE OR OVEREATING: SEVERAL DAYS
7. TROUBLE CONCENTRATING ON THINGS, SUCH AS READING THE NEWSPAPER OR WATCHING TELEVISION: NOT AT ALL
9. THOUGHTS THAT YOU WOULD BE BETTER OFF DEAD, OR OF HURTING YOURSELF: NOT AT ALL
2. FEELING DOWN, DEPRESSED, IRRITABLE, OR HOPELESS: SEVERAL DAYS
6. FEELING BAD ABOUT YOURSELF - OR THAT YOU ARE A FAILURE OR HAVE LET YOURSELF OR YOUR FAMILY DOWN: NOT AL ALL
1. LITTLE INTEREST OR PLEASURE IN DOING THINGS: NOT AT ALL
7. TROUBLE CONCENTRATING ON THINGS, SUCH AS READING THE NEWSPAPER OR WATCHING TELEVISION: NOT AT ALL
8. MOVING OR SPEAKING SO SLOWLY THAT OTHER PEOPLE COULD HAVE NOTICED. OR THE OPPOSITE, BEING SO FIGETY OR RESTLESS THAT YOU HAVE BEEN MOVING AROUND A LOT MORE THAN USUAL: NOT AT ALL
SUM OF ALL RESPONSES TO PHQ9 QUESTIONS 1 AND 2: 1
2. FEELING DOWN, DEPRESSED, IRRITABLE, OR HOPELESS: SEVERAL DAYS
SUM OF ALL RESPONSES TO PHQ QUESTIONS 1-9: 3
SUM OF ALL RESPONSES TO PHQ9 QUESTIONS 1 AND 2: 1
SUM OF ALL RESPONSES TO PHQ QUESTIONS 1-9: 3
4. FEELING TIRED OR HAVING LITTLE ENERGY: NOT AT ALL
1. LITTLE INTEREST OR PLEASURE IN DOING THINGS: NOT AT ALL

## 2021-04-20 ASSESSMENT — COGNITIVE AND FUNCTIONAL STATUS - GENERAL
TOILETING: A LITTLE
CLIMB 3 TO 5 STEPS WITH RAILING: A LITTLE
MOBILITY SCORE: 21
MOBILITY SCORE: 22
SUGGESTED CMS G CODE MODIFIER MOBILITY: CJ
SUGGESTED CMS G CODE MODIFIER MOBILITY: CJ
CLIMB 3 TO 5 STEPS WITH RAILING: A LITTLE
TOILETING: A LITTLE
WALKING IN HOSPITAL ROOM: A LITTLE
SUGGESTED CMS G CODE MODIFIER DAILY ACTIVITY: CI
STANDING UP FROM CHAIR USING ARMS: A LITTLE
DAILY ACTIVITIY SCORE: 23
WALKING IN HOSPITAL ROOM: A LITTLE

## 2021-04-20 ASSESSMENT — LIFESTYLE VARIABLES
AGITATION: NORMAL ACTIVITY
PAROXYSMAL SWEATS: NO SWEAT VISIBLE
HOW MANY TIMES IN THE PAST YEAR HAVE YOU HAD 5 OR MORE DRINKS IN A DAY: 0
ANXIETY: MILDLY ANXIOUS
HEADACHE, FULLNESS IN HEAD: MODERATE
PAROXYSMAL SWEATS: NO SWEAT VISIBLE
ORIENTATION AND CLOUDING OF SENSORIUM: ORIENTED AND CAN DO SERIAL ADDITIONS
AGITATION: NORMAL ACTIVITY
TOTAL SCORE: 11
HAVE YOU EVER FELT YOU SHOULD CUT DOWN ON YOUR DRINKING: NO
TREMOR: *
TOTAL SCORE: 0
TREMOR: NO TREMOR
VISUAL DISTURBANCES: NOT PRESENT
HEADACHE, FULLNESS IN HEAD: MODERATE
HAVE PEOPLE ANNOYED YOU BY CRITICIZING YOUR DRINKING: NO
AGITATION: NORMAL ACTIVITY
VISUAL DISTURBANCES: NOT PRESENT
AUDITORY DISTURBANCES: NOT PRESENT
TREMOR: NO TREMOR
TOTAL SCORE: 5
ANXIETY: *
EVER FELT BAD OR GUILTY ABOUT YOUR DRINKING: YES
AUDITORY DISTURBANCES: NOT PRESENT
AGITATION: NORMAL ACTIVITY
NAUSEA AND VOMITING: *
PAROXYSMAL SWEATS: NO SWEAT VISIBLE
TREMOR: *
TOTAL SCORE: 1
AGITATION: NORMAL ACTIVITY
ANXIETY: MILDLY ANXIOUS
DOES PATIENT WANT TO STOP DRINKING: NO
NAUSEA AND VOMITING: NO NAUSEA AND NO VOMITING
TREMOR: *
VISUAL DISTURBANCES: NOT PRESENT
ANXIETY: NO ANXIETY (AT EASE)
AUDITORY DISTURBANCES: NOT PRESENT
TREMOR: *
AUDITORY DISTURBANCES: NOT PRESENT
AUDITORY DISTURBANCES: NOT PRESENT
ORIENTATION AND CLOUDING OF SENSORIUM: ORIENTED AND CAN DO SERIAL ADDITIONS
HEADACHE, FULLNESS IN HEAD: MILD
ORIENTATION AND CLOUDING OF SENSORIUM: ORIENTED AND CAN DO SERIAL ADDITIONS
ORIENTATION AND CLOUDING OF SENSORIUM: ORIENTED AND CAN DO SERIAL ADDITIONS
TOTAL SCORE: 1
NAUSEA AND VOMITING: NO NAUSEA AND NO VOMITING
ANXIETY: MILDLY ANXIOUS
ALCOHOL_USE: YES
ORIENTATION AND CLOUDING OF SENSORIUM: ORIENTED AND CAN DO SERIAL ADDITIONS
AGITATION: NORMAL ACTIVITY
VISUAL DISTURBANCES: NOT PRESENT
NAUSEA AND VOMITING: NO NAUSEA AND NO VOMITING
AUDITORY DISTURBANCES: NOT PRESENT
PAROXYSMAL SWEATS: NO SWEAT VISIBLE
PAROXYSMAL SWEATS: NO SWEAT VISIBLE
VISUAL DISTURBANCES: NOT PRESENT
AVERAGE NUMBER OF DAYS PER WEEK YOU HAVE A DRINK CONTAINING ALCOHOL: 1
ORIENTATION AND CLOUDING OF SENSORIUM: ORIENTED AND CAN DO SERIAL ADDITIONS
NAUSEA AND VOMITING: NO NAUSEA AND NO VOMITING
TOTAL SCORE: 5
ANXIETY: *
HEADACHE, FULLNESS IN HEAD: NOT PRESENT
NAUSEA AND VOMITING: MILD NAUSEA WITH NO VOMITING
HEADACHE, FULLNESS IN HEAD: MILD
VISUAL DISTURBANCES: NOT PRESENT
EVER HAD A DRINK FIRST THING IN THE MORNING TO STEADY YOUR NERVES TO GET RID OF A HANGOVER: NO
TOTAL SCORE: 1
ON A TYPICAL DAY WHEN YOU DRINK ALCOHOL HOW MANY DRINKS DO YOU HAVE: 3
PAROXYSMAL SWEATS: NO SWEAT VISIBLE
CONSUMPTION TOTAL: NEGATIVE
HEADACHE, FULLNESS IN HEAD: MODERATE
TOTAL SCORE: 6
TOTAL SCORE: 6

## 2021-04-20 ASSESSMENT — ENCOUNTER SYMPTOMS
HEADACHES: 1
NERVOUS/ANXIOUS: 1
PALPITATIONS: 1

## 2021-04-20 ASSESSMENT — FIBROSIS 4 INDEX
FIB4 SCORE: 0.89
FIB4 SCORE: 1

## 2021-04-20 ASSESSMENT — PAIN DESCRIPTION - PAIN TYPE
TYPE: ACUTE PAIN

## 2021-04-20 NOTE — ED NOTES
Pt up to restroom with steady gait, pt obtained scant UA sample, pt aware lab may require more urine and will notify this RN if she is able to void again

## 2021-04-20 NOTE — PROGRESS NOTES
· 2 RN skin check completed with [facility]  · Devices in place pillows in place for positioning.  · Skin assessed under devices yes- skin intact.  · Confirmed pressure ulcers found on n/a.  · New potential pressure ulcers noted on n/a. Wound consult placed? n/a. Photo uploaded? n/a.   · The following interventions are in place- pillows in use for positioning

## 2021-04-20 NOTE — PROGRESS NOTES
Hospital Medicine Daily Progress Note    Date of Service  4/20/2021    Chief Complaint  46 y.o. female admitted 4/20/2021 with altered mental status    Hospital Course  This is a 46 year old female with PMHx of who presented 4/20/2021 with altered mental status noted secondary to drug overdose. Patient ingested rubbing alcohol, benadryl and atarax (quantity unknown). Of note, patient had one of her daughters die one month ago.     UDS negative, salicylates, tylenol negative, alcohol level 121 on admission.     Poison control advises 6-8 hours observation.     Interval Problem Update  Patient was seen and examined at bedside.  Patient reports she binge drank the night prior and took her usual 2 to 4 tablets of Atarax.  Patient denies any suicidal or homicidal ideation.  She was evaluated by psychiatry once medically cleared she will be transferred to inpatient psychiatry.    Patient continues to be tachycardic we will observe overnight, once this resolves she can be transferred to inpatient psychiatry.    Consultants/Specialty  Psychiatry  Social work    Code Status  Full Code    Disposition  TBD    Review of Systems  Review of Systems   All other systems reviewed and are negative.       Physical Exam  Temp:  [36.3 °C (97.3 °F)-37.1 °C (98.8 °F)] 36.3 °C (97.3 °F)  Pulse:  [] 96  Resp:  [15-22] 17  BP: (110-146)/(63-87) 134/77  SpO2:  [94 %-100 %] 98 %    Physical Exam  Vitals and nursing note reviewed.   Constitutional:       General: She is not in acute distress.     Appearance: Normal appearance.   HENT:      Head: Normocephalic and atraumatic.      Mouth/Throat:      Mouth: Mucous membranes are moist.      Pharynx: No oropharyngeal exudate.   Eyes:      Extraocular Movements: Extraocular movements intact.      Pupils: Pupils are equal, round, and reactive to light.   Cardiovascular:      Rate and Rhythm: Regular rhythm. Tachycardia present.      Pulses: Normal pulses.      Heart sounds: No murmur. No  friction rub. No gallop.    Pulmonary:      Effort: Pulmonary effort is normal. No respiratory distress.      Breath sounds: No wheezing, rhonchi or rales.   Abdominal:      General: Bowel sounds are normal. There is no distension.      Palpations: Abdomen is soft. There is no mass.      Tenderness: There is no abdominal tenderness.   Musculoskeletal:         General: No swelling or tenderness. Normal range of motion.      Cervical back: Normal range of motion. No rigidity. No muscular tenderness.      Right lower leg: No edema.      Left lower leg: No edema.   Skin:     General: Skin is warm and dry.      Capillary Refill: Capillary refill takes less than 2 seconds.      Findings: No erythema or rash.   Neurological:      General: No focal deficit present.      Mental Status: She is alert and oriented to person, place, and time.      Motor: No weakness.      Gait: Gait normal.         Fluids    Intake/Output Summary (Last 24 hours) at 4/20/2021 1549  Last data filed at 4/20/2021 0530  Gross per 24 hour   Intake 2000 ml   Output --   Net 2000 ml       Laboratory  Recent Labs     04/20/21  0014   WBC 8.4   RBC 3.88*   HEMOGLOBIN 12.5   HEMATOCRIT 37.0   MCV 95.4   MCH 32.2   MCHC 33.8   RDW 49.8   PLATELETCT 284   MPV 9.8     Recent Labs     04/20/21  0014   SODIUM 135   POTASSIUM 3.5*   CHLORIDE 101   CO2 20   GLUCOSE 121*   BUN 14   CREATININE 1.01   CALCIUM 8.4*     Recent Labs     04/20/21  0014   APTT 24.5*   INR 0.94               Imaging  CT-HEAD W/O   Final Result      No acute intracranial abnormality.      DX-CHEST-PORTABLE (1 VIEW)   Final Result      No acute cardiopulmonary abnormality.           Assessment/Plan  * Intentional drug overdose (HCC)- (present on admission)  Assessment & Plan  Poison control consulted in ED  Continue to monitor with telemetry and IV fluids  Overdose with Benadryl, ethanol, isopropyl alcohol  Legal hold in place    Elevated ETOH level- (present on admission)  Assessment &  Plan  CIWA protocol in place  IVF at 125cc/hour    Hyperglycemia- (present on admission)  Assessment & Plan  a1C 4.9    Tachycardia- (present on admission)  Assessment & Plan  Admitted with telemetry  Benzodiazepines as needed  Continue to monitor       VTE prophylaxis: SCDs, Lovenox

## 2021-04-20 NOTE — DISCHARGE PLANNING
Pt Daughter Imani [phone] called and stated she had a message from Shelia regarding her mother. Imani states her mother was admitted to hospital for overdose.     DAKOTA reviewed chart and Per Naheed Note Pt was requesting her daughter be contacted so that she could inform her job that she would be missing work.   SW gave daughter this information and she stated she would follow up with Pt place of employment.

## 2021-04-20 NOTE — H&P
"Hospital Medicine History & Physical Note    Date of Service  2021    Primary Care Physician  JOSEY Valverde    Consultants      Code Status  Full Code    Chief Complaint  Chief Complaint   Patient presents with   • Drug Overdose     Patient had EMS called by daughter, upon returning home, pt found very altered, A&Ox3, slurring speech. Pt is a known alcoholic but pt told her daughter that she took hyrdoxyzine, benadryl, and pt stated at home she drank isopropyl alcohol. Pt is now denying taking any medications or drinking today. Denies SI/HI. Pt's daughter  1 month ago and per family pt has been struggling.        History of Presenting Illness  46 y.o. female who presented 2021 with chief complaint of altered mental status secondary to drug overdose. Patient denies drinking any alcohol or taking any drugs today. She has no complaints other than a very mild frontal headache. She denies recent alcohol use. Denies illicit drug use. Per the patient's daughter with whom she lives, the patient has a history of depression and anxiety and has been drinking alcohol daily for the past several days due to increased life stressors. Tonight, the patient's daughter awoke at approximately 11:00 PM and noticed that her mother was sitting \"zoned out\". She appeared to have a hard time speaking and when she did speak her speech was slurred. Her daughter asked her what she had taken and initially she said she took 4-25 mg Atarax. She then corrected herself and stated that she took 12 of these pills. She also admitted to drinking approximately 8 ounces of rubbing alcohol and a questionable amount of Benadryl. Apparently her Atarax had 20 pills left yesterday and today when EMS arrived there were zero left. Her daughter notes that the patient kept telling her to \"let me go\" which her daughter interpreted as the patient wanting to die. Daughter is uncertain what time the patient ingested the alcohol or drugs. " Daughter denies that the patient uses illicit drugs.       Notable findings include: Glucose 121, GFR 59, potassium 3.5, EtOH level 121.0, serum osmolality 322,     Isopropyl EtOH, atarax, and benadryl,  ethanol OD    Patient is admitted to hospitalist service for medical management. Poison control advises 6-8 hr obs.Continues to be tachycardic and admitted with telemetry.   Legal hold is in place.       Review of Systems  Review of Systems   Cardiovascular: Positive for palpitations.   Neurological: Positive for headaches.   Psychiatric/Behavioral: The patient is nervous/anxious.    All other systems reviewed and are negative.      Past Medical History   has a past medical history of Anxiety.    Surgical History   has no past surgical history on file.     Family History  family history includes Breast Cancer in her maternal aunt; Cancer in her father; Dementia in her maternal grandmother; Genetic Disorder in her sister; Hyperlipidemia in her mother; Kidney Disease in her father; No Known Problems in her sister, sister, and sister; Osteoporosis in her mother; Parkinson's Disease in her maternal aunt.     Social History   reports that she has never smoked. She has never used smokeless tobacco. She reports current alcohol use of about 3.0 oz of alcohol per week. She reports that she does not use drugs.    Allergies  No Known Allergies    Medications  Prior to Admission Medications   Prescriptions Last Dose Informant Patient Reported? Taking?   hydrOXYzine HCl (ATARAX) 25 MG Tab   No No   Sig: Take 2 Tablets by mouth at bedtime as needed for Anxiety for up to 90 doses.      Facility-Administered Medications: None       Physical Exam  Temp:  [37.1 °C (98.8 °F)] 37.1 °C (98.8 °F)  Pulse:  [105-138] 106  Resp:  [15-20] 19  BP: (110-146)/(63-80) 146/80  SpO2:  [94 %-100 %] 98 %    Physical Exam     Constitutional: Resting comfortably in NAD   HENT: Normocephalic, no obvious evidence of acute trauma.  Eyes: No scleral  icterus. Normal conjunctiva   Neck: Comfortable movement without any obvious restriction in the range of motion.  Cardiovascular: Tachycardia, NSR, with no murmurs, rubs or gallops  Thorax & Lungs: No respiratory distress. No wheezing, rales or rhonchi heard on ausculation.  there is no obvious chest wall tenderness. I appreciate normal air movement throughout.   Abdomen: The abdomen is not visibly distended. Upon palpation, I find it to be without tenderness.  No mass appreciated.  Skin: The exposed portions of skin reveal no obvious rash or other abnormalities.  Extremities/Musculoskeletal: no lower extremity edema with no asymmetry.  Neurologic: Alert & oriented. No focal deficits observed.   Psychiatric: Normal affect appropriate for the clinical situation.    Laboratory:  Recent Labs     04/20/21  0014   WBC 8.4   RBC 3.88*   HEMOGLOBIN 12.5   HEMATOCRIT 37.0   MCV 95.4   MCH 32.2   MCHC 33.8   RDW 49.8   PLATELETCT 284   MPV 9.8     Recent Labs     04/20/21  0014   SODIUM 135   POTASSIUM 3.5*   CHLORIDE 101   CO2 20   GLUCOSE 121*   BUN 14   CREATININE 1.01   CALCIUM 8.4*     Recent Labs     04/20/21  0014   ALTSGPT 37   ASTSGOT 34   ALKPHOSPHAT 109*   TBILIRUBIN 0.2   GLUCOSE 121*     Recent Labs     04/20/21  0014   APTT 24.5*   INR 0.94     No results for input(s): NTPROBNP in the last 72 hours.      Recent Labs     04/20/21  0014   TROPONINT 7       Imaging:  CT-HEAD W/O   Final Result      No acute intracranial abnormality.      DX-CHEST-PORTABLE (1 VIEW)   Final Result      No acute cardiopulmonary abnormality.            Assessment/Plan:  I anticipate this patient will require at least two midnights for appropriate medical management, necessitating inpatient admission.    Elevated ETOH level- (present on admission)  Assessment & Plan  CIWA protocol in place  IVF at 125cc/hour    Hyperglycemia- (present on admission)  Assessment & Plan  Glucose 121  a1C ordered to assess    Tachycardia- (present on  admission)  Assessment & Plan  Admitted with telemetry  Benzodiazepines as needed  Continue to monitor    Intentional drug overdose (HCC)- (present on admission)  Assessment & Plan  Poison control consulted in ED  Continue to monitor with telemetry and IV fluids  Overdose with Benadryl, ethanol, isopropyl alcohol  Legal hold in place

## 2021-04-20 NOTE — ED TRIAGE NOTES
"Chief Complaint   Patient presents with   • Drug Overdose     Patient had EMS called by daughter, upon returning home, pt found very altered, A&Ox3, slurring speech. Pt is a known alcoholic but pt told her daughter that she took hyrdoxyzine, benadryl, and pt stated at home she drank isopropyl alcohol. Pt is now denying taking any medications or drinking today. Denies SI/HI. Pt's daughter  1 month ago and per family pt has been struggling.        Pt was put on a legal hold by [facility].    /70   Pulse (!) 138   Temp 37.1 °C (98.8 °F) (Temporal)   Resp 18   Ht 1.651 m (5' 5\")   Wt 74.8 kg (165 lb)   LMP  (LMP Unknown)   SpO2 96%   BMI 27.46 kg/m²     "

## 2021-04-20 NOTE — DISCHARGE PLANNING
Anticipated Discharge Disposition: inpatient psychiatric facility    Action: faxed copy of legal hold paperwork to [facility] [id number]    Barriers to Discharge: legal hold    Plan: inpatient psychiatric facility upon medical clearance

## 2021-04-20 NOTE — ED NOTES
Pt rounded on, resting in bed, respirations even and unlabored, repositioning self as needed, updated on POC, sitter in view. Lactic will be drawn once IVF infusion is complete.

## 2021-04-20 NOTE — ED NOTES
Assumed care of patient. Pt assessed, AAO x 4, slurred speech. Pt continues to deny SI, pt is on legal hold by PD so all non medically necessary items removed, sitter in direct view, and all pt belongings removed from room to ensure pt safety. Traction at BS for bladder scan. Patient's concerns addressed.  Fall precautions in place.  Pt repositioned and comfortable.  Call light within reach. Will continue to monitor. This RN masked and in appropriate PPE during encounter.

## 2021-04-20 NOTE — ED NOTES
Report received from [facility] Dhaliwal.  Patient ambulatory to and from bathroom.  Patient calm and cooperative.   Sitter remains in direct view of patient.

## 2021-04-20 NOTE — HOSPITAL COURSE
This is a 46 year old female with PMHx of who presented 4/20/2021 with altered mental status noted secondary to drug overdose. Patient ingested rubbing alcohol, benadryl and atarax (quantity unknown). Of note, patient had her sister die, less than one month ago.     UDS negative, salicylates, tylenol negative, alcohol level 121 on admission.     Poison control advises 6-8 hours observation.

## 2021-04-20 NOTE — ED NOTES
Bedside report given via IPASS tool. Sitter in direct view, All questions answered. No acute pt changes.

## 2021-04-20 NOTE — DISCHARGE PLANNING
Alert Team:    Will initiate ED Consult once patient is clinically sober. Diagnostic Alcohol at 0014 was 121.0.  1:1 sitter observing patient safety outside of patient room. Alert Team will continue to monitor.

## 2021-04-20 NOTE — ASSESSMENT & PLAN NOTE
Poison control consulted in ED  Continue to monitor with telemetry and IV fluids  Overdose with Benadryl, ethanol, isopropyl alcohol  Legal hold in place

## 2021-04-20 NOTE — CONSULTS
"PSYCHIATRIC INTAKE EVALUATION     *Reason for admission: Alcohol withdrawal             *Reason for consult: Possible suicide attempt by overdose       *Requesting Physician/APN: Félix Honeycutt M.D.          Legal Hold status:  on legal hold         *Chief Complaint: Altered mental status         *HPI (includes Psychiatric ROS): Chula Uribe is a 46 year old female with a PMH significant for anxiety who presented to the ED for suspected overdose last night. Per the patient's daughter with whom she lives, \"the patient has a history of depression and anxiety and has been drinking alcohol daily for the past several days due to increased life stressors. T[place]ight, the patient's daughter awoke at approximately 11:00 PM and noticed that her mother was sitting \"zoned out\". She appeared to have a hard time speaking and when she did speak her speech was slurred. Her daughter asked her what she had taken and initially she said she took 4, 25 mg Atarax. She then corrected herself and stated that she took 12 of these pills. She also admitted to drinking approximately 8 ounces of rubbing alcohol and a questionable amount of Benadryl. Apparently her Atarax had 20 pills left yesterday and today when EMS arrived there were zero left. Her daughter notes that the patient kept telling her to \"let me go\" which her daughter interpreted as the patient wanting to die. Daughter is uncertain what time the patient ingested the alcohol or drugs. Daughter denies that the patient uses illicit drugs.\"    The patient reports that she took 4, 25 mg hydroxyzine pills last night to help her sleep. She does admit to drinking \"too much\" alcohol yesterday, but is unsure how much she drank. She states that she was drinking vodka with orange juice. The patient denies taking any other pills or drinking isopropyl alcohol. She reports that she was brought to the ED by ambulance from and urgent care where she was visiting her aunt. The patient denies " that this was an attempt to harm/kill herself. She denies wishing she was dead or thoughts of harming or killing herself currently. She is happy that she is alive.     The patient is a difficult historian as she has extreme difficulty concentrating, forgetting what she was talking about or what question she was answering, requiring frequent redirection. She is very tangential in her answers. It appears the patient has several recent stressors. Her sister  from COVID related complications 3 weeks ago. It seems that she was very close with her sister. Additionally, she reports that her aunt (her mother's sister) is being treated for cancer which she worries about a lot. She is unsure what type of cancer her aunt has or what the prognosis is. Per patient, she is likely losing her job this week as well. She is unable to express why, although it sounds like maybe the company is going out of business.     She has difficulty describing her mood, but when asked if she has felt depressed she nods yes. This depressed mood has been worse in the past 3 weeks since the death of her sister. She reports associated difficulty sleeping due to too many things on her mind. She states that she is only able to sleep if she takes hydroxyzine. Per patient, her doctor told her she can take up to 4, 25 mg hydroxyzine at a time for sleep. She takes multiple hydroxyzine every week night to help her sleep and sometimes on the weekends as well. She reports that her appetite, energy, interest are normal. She is not sure how her concentration has been. Patient denies any thoughts of guilt or worthlessness. Per patient, she experiences anxiety daily and worries about many things. This interferes with her sleep.     Denies symptoms consistent with nii, psychosis, PTSD, OCD.        *Medical Review Of Symptoms (not dx conditions):   Review of Systems   Constitutional: Negative for chills, fever and weight loss.   HENT: Negative for congestion  and sore throat.     Eyes: Negative for blurred vision and double vision.   Respiratory: Positive for cough. Negative for shortness of breath.     Cardiovascular: Negative for chest pain and palpitations.   Gastrointestinal: Negative for abdominal pain, constipation, diarrhea, nausea and vomiting.   Genitourinary: Negative for dysuria, frequency, hematuria and urgency.   Musculoskeletal: Negative for back pain and joint pain.   Neurological: Positive for tremors and headaches. Negative for dizziness, seizures and loss of consciousness.   Psychiatric/Behavioral: Positive for depression and substance abuse. Negative for hallucinations and suicidal ideas. The patient is nervous/anxious and has insomnia.          *Psychiatric Examination:   Vitals:   Vitals:   04/20/21 0704 04/20/21 0800 04/20/21 0836 04/20/21 0939   BP: 140/81 115/83 118/87   Pulse: 96 95 (!) 111 88   Resp: (!) 21 20 17   Temp: 36.6 °C (97.9 °F)   TempSrc: Temporal   SpO2: 99% 96% 98% 99%   Weight:   Height:     General Appearance:  good eye contact and cooperative but very easily distracted with poor concentration   Abnormal Movements: restless   Gait and Posture: not observed   Speech: soft and slurred   Thought Process: slow rate   Associations:   tangential   Abnormal or Psychotic Thoughts: none   Judgement and Insight: impaired     Orientation: A&O x3.   Recent and Remote Memory: impaired   Attention Span and Concentration: distracted and diminished   Language:comprehends spoken commands and fluent   Fund of Knowledge: good   Mood and Affect: depressed and appropriate to mood   SI/HI:  suicidal - no and homicidal - no   MMSE score and/or clock drawing:not applicable     *PAST MEDICAL/PSYCH/FAMILY/SOCIAL(as reported by patient):       *medical hx:             Past Medical History:   Diagnosis Date   · Anxiety     History reviewed. No pertinent surgical history.     *psychiatric hx:     SAs:Patient denies a history of previous suicide attempts. Per  chart review, the patient has a hx of SA and a psych hosp of 3 days in [place]. When asked about this, patient does not recall this event.   Guns:does not have any   Hx of Violence: none   Hospitalizations:One per chart review. Patient denies any psychiatric hospital admissions.   Med Hx: Hydroxyzine 25 mg for anxiety   Dx Hx: Generalized anxiety disorder. Patient denies ever having a formal diagnosis of depression.   Other: Patient sees a therapist every 2 weeks. She is unable to express what she sees this therapist for or what types of things they work on.     *family Psych hx: Patient denies knowledge of family psychiatric history including any diagnoses, substance use disorders, suicide attempts/completions, psychiatric hospitalizations.         *social hx: Patient states that she has lived in [place] with her daughter for the past 2 years. Prior to this she lived in [facility]. She feels safe at home. She has been  2x in the past but is not  or in a relationship currently. She did not graduate from high school but did get her GED and took a few college courses. 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   Alcohol: Patient has struggled with alcohol use for many years. She was sober for 3 years. It is unclear how long the patient has been drinking again, or how much she drinks. She states she has gotten shaky and anxious with alcohol withdrawal in the past. She denies a history of alcohol withdrawal seizures, DTs, or hospitalizations for alcohol withdrawal.   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 She has been clean since that time. She smokes marijuana occasionally. She denies any other recreational drug use.     *MEDICAL HX: labs, MARS, medications, etc were reviewed. Only those findings of potential interest to psychiatry are noted below:     *Current Medical issues:   see below       *Allergies:   No Known Allergies    *Current Medications:     Current Facility-Administered Medications:   ·  lactated ringers infusion, , Intravenous, Continuous, Hina Anton M.D., Last Rate: 125 mL/hr at 04/20/21 0638, New Bag at 04/20/21 0638   ·  enoxaparin (LOVENOX) inj 40 mg, 40 mg, Subcutaneous, DAILY, Hina Anton M.D., 40 mg at 04/20/21 0756   ·  acetaminophen (Tylenol) tablet 650 mg, 650 mg, Oral, Q6HRS PRN, Hina Antno M.D.   ·  cloNIDine (CATAPRES) tablet 0.1 mg, 0.1 mg, Oral, Q6HRS PRN, Hina Anton M.D.   ·  enalaprilat (VASOTEC) injection 1.25 mg, 1.25 mg, Intravenous, Q6HRS PRN, Hina Anton M.D.   ·  ondansetron (ZOFRAN) syringe/vial injection 4 mg, 4 mg, Intravenous, Q4HRS PRN, Hina Anton M.D.   ·  ondansetron (ZOFRAN ODT) dispertab 4 mg, 4 mg, Oral, Q4HRS PRN, Hina Anton M.D.   ·  promethazine (PHENERGAN) tablet 12.5-25 mg, 12.5-25 mg, Oral, Q4HRS PRN, Hina Anton M.D.   ·  promethazine (PHENERGAN) suppository 12.5-25 mg, 12.5-25 mg, Rectal, Q4HRS PRN, Hina Anton M.D.   ·  prochlorperazine (COMPAZINE) injection 5-10 mg, 5-10 mg, Intravenous, Q4HRS PRN, Hina Anton M.D.   ·  LORazepam (ATIVAN) tablet 0.5 mg, 0.5 mg, Oral, Q4HRS PRN, Hina Anton M.D.   ·  LORazepam (ATIVAN) tablet 1 mg, 1 mg, Oral, Q4HRS PRN **OR** LORazepam (ATIVAN) injection 0.5 mg, 0.5 mg, Intravenous, Q4HRS PRN, Hina Anton M.D.   ·  LORazepam (ATIVAN) tablet 2 mg, 2 mg, Oral, Q2HRS PRN, 2 mg at 04/20/21 0646 **OR** LORazepam (ATIVAN) injection 1 mg, 1 mg, Intravenous, Q2HRS PRN, Hina Anton M.D.   ·  LORazepam (ATIVAN) tablet 3 mg, 3 mg, Oral, Q HOUR PRN **OR** LORazepam (ATIVAN) injection 1.5 mg, 1.5 mg, Intravenous, Q HOUR PRN, Hina Anton M.D.   ·  LORazepam (ATIVAN) tablet 4 mg, 4 mg, Oral, Q15 MIN PRN **OR** LORazepam (ATIVAN) injection 2 mg, 2 mg, Intravenous, Q15 MIN PRN, Hina Anton M.D.   ·  Metoprolol Tartrate (LOPRESSOR) injection 5 mg, 5 mg, Intravenous, Q5 MIN  PRN, Hina Anton M.D.   ·  senna-docusate (PERICOLACE or SENOKOT S) 8.6-50 MG per tablet 2 tablet, 2 tablet, Oral, BID PRN **AND** polyethylene glycol/lytes (MIRALAX) PACKET 1 Packet, 1 Packet, Oral, QDAY PRN **AND** magnesium hydroxide (MILK OF MAGNESIA) suspension 30 mL, 30 mL, Oral, QDAY PRN **AND** bisacodyl (DULCOLAX) suppository 10 mg, 10 mg, Rectal, QDAY PRN, Karen Bill, D.O.   ·  folic acid (FOLVITE) tablet 1 mg, 1 mg, Oral, DAILY, Karen Fly, D.O., 1 mg at 04/20/21 0756   ·  thiamine (Vitamin B-1) tablet 100 mg, 100 mg, Oral, DAILY, Karenharjit Bill, D.O., 100 mg at 04/20/21 0756   ·  multivitamin (THERAGRAN) tablet 1 tablet, 1 tablet, Oral, DAILY, Karenmeghana Bill, D.O., 1 tablet at 04/20/21 0756     Current Outpatient Medications:   ·  VENTOLIN  (90 Base) MCG/ACT Aero Soln inhalation aerosol, Inhale 2 Puffs every four hours as needed., Disp: , Rfl:   ·  hydrOXYzine HCl (ATARAX) 25 MG Tab, Take 2 Tablets by mouth at bedtime as needed for Anxiety for up to 90 doses., Disp: 120 tablet, Rfl: 0   *ECG: personally reviewed QTc 388   *Cranial Imaging: personally reviewed CT with no acute intracranial abnormality.      EEG: None on file       *Labs:   Recent Labs     04/20/21   0014   WBC 8.4   RBC 3.88*   HEMOGLOBIN 12.5   HEMATOCRIT 37.0   MCV 95.4   MCH 32.2   RDW 49.8   PLATELETCT 284   MPV 9.8   NEUTSPOLYS 60.40   LYMPHOCYTES 31.50   MONOCYTES 4.90   EOSINOPHILS 1.80   BASOPHILS 0.90     Lab Results   Component Value Date/Time   SODIUM 135 04/20/2021 12:14 AM   POTASSIUM 3.5 (L) 04/20/2021 12:14 AM   CHLORIDE 101 04/20/2021 12:14 AM   CO2 20 04/20/2021 12:14 AM   GLUCOSE 121 (H) 04/20/2021 12:14 AM   BUN 14 04/20/2021 12:14 AM   CREATININE 1.01 04/20/2021 12:14 AM          Lab Results   Component Value Date/Time   BREATHALIZER 0.122 (A) 03/16/2019 2346     No components found for: BLOODALCOHOL   Lab Results   Component Value Date/Time   AMPHUR Negative 04/20/2021 0248   BARBSURINE Negative  04/20/2021 0248   BENZODIAZU Negative 04/20/2021 0248   COCAINEMET Negative 04/20/2021 0248   METHADONE Negative 04/20/2021 0248   OPIATES Negative 04/20/2021 0248   OXYCODN Negative 04/20/2021 0248   PCPURINE Negative 04/20/2021 0248   PROPOXY Negative 04/20/2021 0248   CANNABINOID Negative 04/20/2021 0248     No results found for: TSH, FREET4        *ASSESSMENT/RECOMENDATIONS:   Chula Uribe is a 46 year old female with a PMH significant for anxiety who was found in an altered mental status and was brought to the ED for drug overdose and alcohol intoxication.. It is unclear at this time whether this was a suicide attempt, patient denies that it was. She has several recent significant stressors, including the death of her sister 3 weeks ago, as well as increased alcohol use for at least the past day as well as a history of previous suicide attempt. The patient will remain on a legal hold at this time. Will discuss with the patient further when she is more able to participate.       Dx: Alcohol intoxication/withdrawal  unspecified anxiety disorder  Alcohol use disorder, severe  unspecified depressive disorder   R/o grief   R/o adjustment disorder  Rule out substance-induced mood disorder    Medical:   Alcohol withdrawal   Hyperglycemia   Tachycardia   Drug overdose     Plan:   Legal hold: extended  Hold psychotropic medications at this time  Please transfer patient to inpatient psychiatric hospital when medically cleared and a bed is available  Psychiatry will continue to follow      Observation status: 1:1 sitter   Visitors: Family only  Phone: Hospital phone, can speak with family only, supervised  Personal belongings: no    This note was created using voice recognition software (Dragon). The accuracy of the dictation is limited by the abilities of the software. I have reviewed the note prior to signing. However, error related to voice recognition software and /or scribes may still exist and should be  interpreted within the appropriate context.

## 2021-04-20 NOTE — ED NOTES
Bladder scan 176ml. Pt was given instruction on proper collection of UA- Pt verbalized understanding. Pt unable to void at this time.

## 2021-04-20 NOTE — DISCHARGE PLANNING
Alert Team:    Completed ED consult; IP Psychiatry consult ordered; patient to be admitted to hospital. Called Daughter-Imani at [phone] at patient's request to ask daughter to inform patient's work of her she will out. Provided ER MSW number to assist with daughter connecting with patient if this writer is unavailable.

## 2021-04-20 NOTE — CONSULTS
[facility]   TRIAGE ASSESSMENT    Name: Chula Uribe  MRN: [id number]  : 1975  Age: 46 y.o.  Date of assessment: 2021  PCP: JOSEY Valverde  Persons in attendance: Patient    CHIEF COMPLAINT/PRESENTING ISSUE (as stated by Patient, ER RN, MARIN ):   Chief Complaint   Patient presents with   • Drug Overdose     Patient had EMS called by daughter, upon returning home, pt found very altered, A&Ox3, slurring speech. Pt is a known alcoholic but pt told her daughter that she took hyrdoxyzine, benadryl, and pt stated at home she drank isopropyl alcohol. Pt is now denying taking any medications or drinking today. Denies SI/HI. Pt's daughter  1 month ago and per family pt has been struggling.       Patient reports recent stressors increasing her depression from death of her sister last month on the , her aunt's lung cancer diagnosis and another sister's castañeda with Covid. Patient admits to relapsing yesterday after 3 years of sobriety and only taking pills to help her sleep. Collateral information obtained by PD from patient's daughter reports the patient having vocalized SI prior to incident. Patient is guarded, resistant and minimizing incident; mood is depressed; affect is flat; somnolent and oriented x 4; patient's judgement and insight is impaired; eye contact is poor. Patient denies SI, HI or AVH; thought content is WNL and thought process is logical. Patient denies any history of SAs or self-harm behaviors. Patient would benefit further psychiatric evaluation at this time.       CURRENT LIVING SITUATION/SOCIAL SUPPORT: Patient reports living with her 20 y/o daughter. Reports good support system from family. Currently employed [employer].     BEHAVIORAL HEALTH TREATMENT HISTORY  Does patient/parent report a history of prior behavioral health treatment for patient?   Patient reports engaged in biweekly therapy with a therapist named Candice x 2 months.     SAFETY ASSESSMENT - SELF  Does  "patient acknowledge current or past symptoms of dangerousness to self? no  Does parent/significant other report patient has current or past symptoms of dangerousness to self? N\A  Does presenting problem suggest symptoms of dangerousness to self? Patient presents to ER after ingesting Benadryl and hydroxyzine medication with isopropyl alcohol. Now clinically sober patient denies this to a suicidal attempt only stating, \"I just wanted to go to sleep.\" PD reports collateral information from patient daughter reports patient had been vocalizing SI prior to attempt. Patient denies any history of SA or SH behavior.     SAFETY ASSESSMENT - OTHERS  Does patient acknowledge current or past symptoms of aggressive behavior or risk to others? no  Does parent/significant other report patient has current or past symptoms of aggressive behavior or risk to others?  N\A  Does presenting problem suggest symptoms of dangerousness to others? No; denies HI or aggression hsitory.     Crisis Safety Plan completed and copy given to patient? N\A    ABUSE/NEGLECT SCREENING  Does patient report feeling “unsafe” in his/her home, or afraid of anyone?  no  Does patient report any history of physical, sexual, or emotional abuse?  no  Does parent or significant other report any of the above? N\A  Is there evidence of neglect by self?  no  Is there evidence of neglect by a caregiver? no  Does the patient/parent report any history of CPS/APS/police involvement related to suspected abuse/neglect or domestic violence? no  Based on the information provided during the current assessment, is a mandated report of suspected abuse/neglect being made?  No    SUBSTANCE USE SCREENING  Yes:  Deven all substances used in the past 30 days: Patient reports relapsing after 3 years of sobriety.       Last Use Amount   [x]   Alcohol 4/19/2021    []   Marijuana     []   Heroin     []   Prescription Opioids  (used without prescription, for    recreation, or in excess of " "prescribed amount)     []   Other Prescription  (used without prescription, for    recreation, or in excess of prescribed amount)     []   Cocaine      []   Methamphetamine     []   \"\" drugs (ectasy, MDMA)     []   Other substances        UDS results: negative  Breathalyzer results: 121.0, 0.00    What consequences does the patient associate with any of the above substance use and or addictive behaviors? Health problems: Alcohol addiction    Risk factors for detox (check all that apply):  []  Seizures   []  Diaphoretic (sweating)   []  Tremors   []  Hallucinations   []  Increased blood pressure   []  Decreased blood pressure   []  Other   [x]  None      [x] Patient education on risk factors for detoxification and instructed to return to ER as needed.      MENTAL STATUS   Participation: Limited verbal participation, Guarded and Resistant  Grooming: Casual  Orientation: Fully Oriented and Drowsy/Somnolent  Behavior: Calm  Eye contact: Poor  Mood: Depressed  Affect: Flat  Thought process: Logical  Thought content: Within normal limits  Speech: Soft and Hypotalkative  Perception: Within normal limits  Memory:  No gross evidence of memory deficits  Insight: Poor  Judgment:  Poor  Other:    Collateral information:   Source:  [] Significant other present in person:   [] Significant other by telephone  [] [facility]   [x] [facility] Staff  [x] [facility]  [x] Other: ERP    [] Unable to complete full assessment due to:  [] Acute intoxication  [] Patient declined to participate/engage  [] Patient verbally unresponsive  [] Significant cognitive deficits  [] Significant perceptual distortions or behavioral disorganization  [x] Other: N/A     CLINICAL IMPRESSIONS:  Primary:  suicidal attempt  Secondary: depression, grief, alcohol use       IDENTIFIED NEEDS/PLAN:  [Trigger DISPOSITION list for any items marked]    [x]  Imminent safety risk - self [] Imminent safety risk - others   []  Acute " substance withdrawal []  Psychosis/Impaired reality testing   [x]  Mood/anxiety []  Substance use/Addictive behavior   [x]  Maladaptive behaviro [x]  Parent/child conflict   []  Family/Couples conflict []  Biomedical   []  Housing []  Financial   []   Legal  Occupational/Educational   []  Domestic violence []  Other:     Recommended Plan of Care:  Actively being addressed by Legal Hold and [facility] and 1:1 Observation for high risk on [place] Suicide Scale.     Does patient express agreement with the above plan? yes    Referral appointment(s) scheduled? N\A    Alert team only: Patient placed on legal hold after suicidal attempt by over dose while intoxicated. Patient will require admission to hospital due to severity of attempt.   I have discussed findings and recommendations with Dr. Honeycutt who is in agreement with these recommendations.       Shelia Gilman, R.N.  4/20/2021

## 2021-04-20 NOTE — ED PROVIDER NOTES
"ED Provider Note    CHIEF COMPLAINT  Chief Complaint   Patient presents with   • Drug Overdose     Patient had EMS called by daughter, upon returning home, pt found very altered, A&Ox3, slurring speech. Pt is a known alcoholic but pt told her daughter that she took hyrdoxyzine, benadryl, and pt stated at home she drank isopropyl alcohol. Pt is now denying taking any medications or drinking today. Denies SI/HI. Pt's daughter  1 month ago and per family pt has been struggling.        HPI  Chula Uribe is a 46 y.o. female who presents with a chief complaint of altered mental status secondary to drug overdose. Patient denies drinking any alcohol or taking any drugs today. She has no complaints other than a very mild frontal headache. She denies recent alcohol use. Denies illicit drug use. Per the patient's daughter with whom she lives, the patient has a history of depression and anxiety and has been drinking alcohol daily for the past several days due to increased life stressors. Tonight, the patient's daughter awoke at approximately 11:00 PM and noticed that her mother was sitting \"zoned out\". She appeared to have a hard time speaking and when she did speak her speech was slurred. Her daughter asked her what she had taken and initially she said she took 4-25 mg Atarax. She then corrected herself and stated that she took 12 of these pills. She also admitted to drinking approximately 8 ounces of rubbing alcohol and a questionable amount of Benadryl. Apparently her Atarax had 20 pills left yesterday and today when EMS arrived there were zero left. Her daughter notes that the patient kept telling her to \"let me go\" which her daughter interpreted as the patient wanting to die. Daughter is uncertain what time the patient ingested the alcohol or drugs. Daughter denies that the patient uses illicit drugs.    REVIEW OF SYSTEMS  See HPI for further details. Drug overdose. Alcohol intoxication. Rubbing alcohol ingestion. " "All other systems are negative.     PAST MEDICAL HISTORY   has a past medical history of Anxiety.    SOCIAL HISTORY  Social History     Tobacco Use   • Smoking status: Never Smoker   • Smokeless tobacco: Never Used   Substance and Sexual Activity   • Alcohol use: Yes     Alcohol/week: 3.0 oz     Types: 5 Shots of liquor per week     Comment: pt is denying using alcohol   • Drug use: No   • Sexual activity: Yes     Partners: Male     Birth control/protection: Condom       SURGICAL HISTORY  patient denies any surgical history    CURRENT MEDICATIONS  Home Medications     Reviewed by Felecia Hood R.N. (Registered Nurse) on 04/20/21 at 0012  Med List Status: Not Addressed   Medication Last Dose Status   hydrOXYzine HCl (ATARAX) 25 MG Tab  Active                ALLERGIES  No Known Allergies    PHYSICAL EXAM  VITAL SIGNS: /70   Pulse (!) 138   Temp 37.1 °C (98.8 °F) (Temporal)   Resp 18   Ht 1.651 m (5' 5\")   Wt 74.8 kg (165 lb)   LMP  (LMP Unknown)   SpO2 96%   BMI 27.46 kg/m²    Pulse ox interpretation: I interpret this pulse ox as normal.  Constitutional: Alert in no apparent distress.  HENT: No signs of trauma, Bilateral external ears normal, Nose normal. Dry mucous membranes.  Eyes: Pupils are equal and reactive, Conjunctiva normal, Non-icteric.   Neck: Normal range of motion, No tenderness, Supple, No stridor.   Lymphatic: No lymphadenopathy noted.   Cardiovascular: Tachycardic with regular rhythm, no murmurs. Pulses symmetrical.  Thorax & Lungs: Normal breath sounds, No respiratory distress, No wheezing, No chest tenderness.   Abdomen: Bowel sounds normal, Soft, No tenderness, No masses, No pulsatile masses. No peritoneal signs.  Skin: Warm, Dry, No erythema, No rash.   Back: Normal alignment.  Extremities: Intact distal pulses, No edema, No tenderness, No cyanosis.  Musculoskeletal: Good range of motion in all major joints. No tenderness to palpation or major deformities noted.   Neurologic: " Alert, oriented x3, slightly tremulous, slow to answer questions but no focal deficits noted. Does not appear to be responding to internal stimuli  Psychiatric: Withdrawn.     DIAGNOSTIC STUDIES / PROCEDURES    LABS  Results for orders placed or performed during the hospital encounter of 04/20/21   HCG QUAL SERUM   Result Value Ref Range    Beta-Hcg Qualitative Serum Negative Negative   CBC WITH DIFFERENTIAL   Result Value Ref Range    WBC 8.4 4.8 - 10.8 K/uL    RBC 3.88 (L) 4.20 - 5.40 M/uL    Hemoglobin 12.5 12.0 - 16.0 g/dL    Hematocrit 37.0 37.0 - 47.0 %    MCV 95.4 81.4 - 97.8 fL    MCH 32.2 27.0 - 33.0 pg    MCHC 33.8 33.6 - 35.0 g/dL    RDW 49.8 35.9 - 50.0 fL    Platelet Count 284 164 - 446 K/uL    MPV 9.8 9.0 - 12.9 fL    Neutrophils-Polys 60.40 44.00 - 72.00 %    Lymphocytes 31.50 22.00 - 41.00 %    Monocytes 4.90 0.00 - 13.40 %    Eosinophils 1.80 0.00 - 6.90 %    Basophils 0.90 0.00 - 1.80 %    Immature Granulocytes 0.50 0.00 - 0.90 %    Nucleated RBC 0.00 /100 WBC    Neutrophils (Absolute) 5.10 2.00 - 7.15 K/uL    Lymphs (Absolute) 2.66 1.00 - 4.80 K/uL    Monos (Absolute) 0.41 0.00 - 0.85 K/uL    Eos (Absolute) 0.15 0.00 - 0.51 K/uL    Baso (Absolute) 0.08 0.00 - 0.12 K/uL    Immature Granulocytes (abs) 0.04 0.00 - 0.11 K/uL    NRBC (Absolute) 0.00 K/uL   COMP METABOLIC PANEL   Result Value Ref Range    Sodium 135 135 - 145 mmol/L    Potassium 3.5 (L) 3.6 - 5.5 mmol/L    Chloride 101 96 - 112 mmol/L    Co2 20 20 - 33 mmol/L    Anion Gap 14.0 7.0 - 16.0    Glucose 121 (H) 65 - 99 mg/dL    Bun 14 8 - 22 mg/dL    Creatinine 1.01 0.50 - 1.40 mg/dL    Calcium 8.4 (L) 8.5 - 10.5 mg/dL    AST(SGOT) 34 12 - 45 U/L    ALT(SGPT) 37 2 - 50 U/L    Alkaline Phosphatase 109 (H) 30 - 99 U/L    Total Bilirubin 0.2 0.1 - 1.5 mg/dL    Albumin 4.1 3.2 - 4.9 g/dL    Total Protein 6.9 6.0 - 8.2 g/dL    Globulin 2.8 1.9 - 3.5 g/dL    A-G Ratio 1.5 g/dL   LACTIC ACID   Result Value Ref Range    Lactic Acid 2.9 (H) 0.5 - 2.0  mmol/L   LACTIC ACID   Result Value Ref Range    Lactic Acid 1.9 0.5 - 2.0 mmol/L   TROPONIN   Result Value Ref Range    Troponin T 7 6 - 19 ng/L   APTT   Result Value Ref Range    APTT 24.5 (L) 24.7 - 36.0 sec   PROTHROMBIN TIME   Result Value Ref Range    PT 12.8 12.0 - 14.6 sec    INR 0.94 0.87 - 1.13   URINALYSIS    Specimen: Urine, Clean Catch   Result Value Ref Range    Color Yellow     Character Clear     Specific Gravity 1.021 <1.035    Ph >=9.0 (A) 5.0 - 8.0    Glucose Negative Negative mg/dL    Ketones Negative Negative mg/dL    Protein Negative Negative mg/dL    Bilirubin Negative Negative    Urobilinogen, Urine 0.2 Negative    Nitrite Negative Negative    Leukocyte Esterase Negative Negative    Occult Blood Negative Negative    Micro Urine Req see below    Salicylate   Result Value Ref Range    Salicylates, Quant. <1 (L) 15 - 25 mg/dL   ACETAMINOPHEN   Result Value Ref Range    Acetaminophen -Tylenol <5 (L) 10 - 30 ug/mL   DIAGNOSTIC ALCOHOL   Result Value Ref Range    Diagnostic Alcohol 121.0 (H) 0.0 - 10.0 mg/dL   OSMOLALITY SERUM   Result Value Ref Range    Osmolality Serum 322 (H) 278 - 298 mOsm/kg H2O   URINE DRUG SCREEN   Result Value Ref Range    Amphetamines Urine Negative Negative    Barbiturates Negative Negative    Benzodiazepines Negative Negative    Cocaine Metabolite Negative Negative    Methadone Negative Negative    Opiates Negative Negative    Oxycodone Negative Negative    Phencyclidine -Pcp Negative Negative    Propoxyphene Negative Negative    Cannabinoid Metab Negative Negative   ESTIMATED GFR   Result Value Ref Range    GFR If African American >60 >60 mL/min/1.73 m 2    GFR If Non African American 59 (A) >60 mL/min/1.73 m 2   SARS-CoV-2 PCR (24 hour In-House): Collect [facility] swab in [facility]    Specimen: Nasopharyngeal; Respirate   Result Value Ref Range    SARS-CoV-2 Source NP Swab     SARS-CoV-2 by PCR NotDetected    Magnesium   Result Value Ref Range    Magnesium 2.2 1.5 - 2.5 mg/dL    HEMOGLOBIN A1C   Result Value Ref Range    Glycohemoglobin 4.9 4.0 - 5.6 %    Est Avg Glucose 94 mg/dL   EKG   Result Value Ref Range    Report       [facility]    Test Date:  2021  Pt Name:    EMELIA BENTON            Department: ER  MRN:        [id number]                      Room:        22  Gender:     Female                       Technician: [id number]  :        1975                   Requested By:XOCHITL MILLS  Order #:    [id number]                    Reading MD:    Measurements  Intervals                                Axis  Rate:       122                          P:          80  NV:         148                          QRS:        38  QRSD:       72                           T:          222  QT:         272  QTc:        388    Interpretive Statements  SINUS TACHYCARDIA  NONSPECIFIC T ABNORMALITIES, DIFFUSE LEADS  Compared to ECG 2019 00:22:28  Prolonged QT interval no longer present  T-wave abnormality still present       RADIOLOGY  CT head  CXR    COURSE & MEDICAL DECISION MAKING  Pertinent Labs & Imaging studies reviewed. (See chart for details)  This is a 46-year-old female with a history of alcohol abuse who is here after ingesting an unknown amount of Atarax, Benadryl, and isopropyl alcohol with ethanol and what sounds like a suicide attempt based on information received from the patient's daughter. Patient arrives tachycardic but afebrile with otherwise normal vital signs. She appears dehydrated and confused. No focal neurologic deficits to suggest that this is a stroke. No obvious head trauma. We will obtain head CT to rule out intracranial mass or bleed as a source of her confusion. Patient is unable or unwilling to provide any information about the events that transpired prior to her arrival. She denies any suicidal or homicidal ideation. She does not appear to be responding to internal stimuli.    Patient started on Ativan and a rally bag.  EKG reveals normal intervals.    Nursing staff spoke with poison control who recommended benzos for seizures. She will need 6 to 8 hours of monitoring after which time she can be medically cleared if she is hemodynamically stable, alert, and oriented without significant lab abnormalities or seizures.    CBC without acute abnormality. Metabolic panel demonstrates hypokalemia to 3.5, hyperglycemia to 121, hypocalcemia to 8.4, alkphos 109, lactic acid 2.9, serum osms 322. Acetaminophen and salicylates are negative. Diagnostic alcohol is 121. UDS negative. HCG negative. Patient given IV fluids and benzos per poison control recommendations. Repeat lactic acid is 1.9.    CT head is without acute abnormality. CXR without acute abnormality.    After 6 hours patient is still tachycardic and mildly confused. I have placed her on a legal hold as her daughter tells me she thinks this was a suicide attempt. I spoke with poison control about continued tachycardia and elevated serum osmols. No additional recommendations beyond admission to telemetry if patient's HR does not return to normal.    Discussed with hospitalist and admitted in guarded condition. She will require psychiatric evaluation or transfer to a psychiatric facility prior to discharge.    HYDRATION  HYDRATION: Based on the patient's presentation of Tachycardia the patient was given IV fluids. IV Hydration was used because oral hydration was not adequate alone. Upon recheck following hydration, the patient was Improved.    FINAL IMPRESSION  1. Drug overdose  2. Alcohol intoxication  3. Isopropyl alcohol ingestion  4. Suicide attempt         Electronically signed by: Félix Honeycutt M.D., 4/20/2021 12:19 AM

## 2021-04-20 NOTE — PROGRESS NOTES
Assumed care of pt. Bedside report received from RNAbelardo. Pt aaox4 and no signs of distress noted at this time. Tele box is on and rhythm verifeid. POC discussed with pt and pt verbalized no questions at this time. Bed low and locked. Treaded socks on. Call light and personal belongings removed from room. All needs met at this time. Will continue POC

## 2021-04-20 NOTE — ED NOTES
Pt ambulated to bathroom with 1 assist. Pt still slightly unsteady on her feet. Unable to urinate. Pt back in room, connected to the monitor

## 2021-04-21 VITALS
WEIGHT: 176.37 LBS | OXYGEN SATURATION: 99 % | DIASTOLIC BLOOD PRESSURE: 89 MMHG | HEART RATE: 105 BPM | BODY MASS INDEX: 29.38 KG/M2 | HEIGHT: 65 IN | TEMPERATURE: 98.2 F | SYSTOLIC BLOOD PRESSURE: 135 MMHG | RESPIRATION RATE: 16 BRPM

## 2021-04-21 PROBLEM — R78.0 ELEVATED ETOH LEVEL: Status: RESOLVED | Noted: 2021-04-20 | Resolved: 2021-04-21

## 2021-04-21 PROBLEM — R00.0 TACHYCARDIA: Status: RESOLVED | Noted: 2021-04-20 | Resolved: 2021-04-21

## 2021-04-21 PROBLEM — R73.9 HYPERGLYCEMIA: Status: RESOLVED | Noted: 2021-04-20 | Resolved: 2021-04-21

## 2021-04-21 LAB
ALBUMIN SERPL BCP-MCNC: 3.5 G/DL (ref 3.2–4.9)
ALBUMIN/GLOB SERPL: 1.3 G/DL
ALP SERPL-CCNC: 96 U/L (ref 30–99)
ALT SERPL-CCNC: 29 U/L (ref 2–50)
ANION GAP SERPL CALC-SCNC: 6 MMOL/L (ref 7–16)
AST SERPL-CCNC: 28 U/L (ref 12–45)
BASOPHILS # BLD AUTO: 1.4 % (ref 0–1.8)
BASOPHILS # BLD: 0.09 K/UL (ref 0–0.12)
BILIRUB SERPL-MCNC: 1.1 MG/DL (ref 0.1–1.5)
BUN SERPL-MCNC: 10 MG/DL (ref 8–22)
CALCIUM SERPL-MCNC: 8.5 MG/DL (ref 8.5–10.5)
CHLORIDE SERPL-SCNC: 107 MMOL/L (ref 96–112)
CHOLEST SERPL-MCNC: 133 MG/DL (ref 100–199)
CO2 SERPL-SCNC: 23 MMOL/L (ref 20–33)
CREAT SERPL-MCNC: 0.79 MG/DL (ref 0.5–1.4)
EOSINOPHIL # BLD AUTO: 0.2 K/UL (ref 0–0.51)
EOSINOPHIL NFR BLD: 3 % (ref 0–6.9)
ERYTHROCYTE [DISTWIDTH] IN BLOOD BY AUTOMATED COUNT: 50.8 FL (ref 35.9–50)
GLOBULIN SER CALC-MCNC: 2.7 G/DL (ref 1.9–3.5)
GLUCOSE SERPL-MCNC: 81 MG/DL (ref 65–99)
HCT VFR BLD AUTO: 36.5 % (ref 37–47)
HDLC SERPL-MCNC: 55 MG/DL
HGB BLD-MCNC: 12.2 G/DL (ref 12–16)
IMM GRANULOCYTES # BLD AUTO: 0.02 K/UL (ref 0–0.11)
IMM GRANULOCYTES NFR BLD AUTO: 0.3 % (ref 0–0.9)
LDLC SERPL CALC-MCNC: 49 MG/DL
LYMPHOCYTES # BLD AUTO: 2.61 K/UL (ref 1–4.8)
LYMPHOCYTES NFR BLD: 39.5 % (ref 22–41)
MAGNESIUM SERPL-MCNC: 1.9 MG/DL (ref 1.5–2.5)
MCH RBC QN AUTO: 32.2 PG (ref 27–33)
MCHC RBC AUTO-ENTMCNC: 33.4 G/DL (ref 33.6–35)
MCV RBC AUTO: 96.3 FL (ref 81.4–97.8)
MONOCYTES # BLD AUTO: 0.41 K/UL (ref 0–0.85)
MONOCYTES NFR BLD AUTO: 6.2 % (ref 0–13.4)
NEUTROPHILS # BLD AUTO: 3.27 K/UL (ref 2–7.15)
NEUTROPHILS NFR BLD: 49.6 % (ref 44–72)
NRBC # BLD AUTO: 0 K/UL
NRBC BLD-RTO: 0 /100 WBC
PLATELET # BLD AUTO: 266 K/UL (ref 164–446)
PMV BLD AUTO: 9.7 FL (ref 9–12.9)
POTASSIUM SERPL-SCNC: 4 MMOL/L (ref 3.6–5.5)
PROT SERPL-MCNC: 6.2 G/DL (ref 6–8.2)
RBC # BLD AUTO: 3.79 M/UL (ref 4.2–5.4)
SODIUM SERPL-SCNC: 136 MMOL/L (ref 135–145)
TRIGL SERPL-MCNC: 144 MG/DL (ref 0–149)
WBC # BLD AUTO: 6.6 K/UL (ref 4.8–10.8)

## 2021-04-21 PROCEDURE — 85025 COMPLETE CBC W/AUTO DIFF WBC: CPT

## 2021-04-21 PROCEDURE — A9270 NON-COVERED ITEM OR SERVICE: HCPCS | Performed by: GENERAL PRACTICE

## 2021-04-21 PROCEDURE — 97161 PT EVAL LOW COMPLEX 20 MIN: CPT

## 2021-04-21 PROCEDURE — 80061 LIPID PANEL: CPT

## 2021-04-21 PROCEDURE — 99239 HOSP IP/OBS DSCHRG MGMT >30: CPT | Performed by: GENERAL PRACTICE

## 2021-04-21 PROCEDURE — 700102 HCHG RX REV CODE 250 W/ 637 OVERRIDE(OP): Performed by: GENERAL PRACTICE

## 2021-04-21 PROCEDURE — 80053 COMPREHEN METABOLIC PANEL: CPT

## 2021-04-21 PROCEDURE — 99232 SBSQ HOSP IP/OBS MODERATE 35: CPT | Performed by: PSYCHIATRY & NEUROLOGY

## 2021-04-21 PROCEDURE — 36415 COLL VENOUS BLD VENIPUNCTURE: CPT

## 2021-04-21 PROCEDURE — 700111 HCHG RX REV CODE 636 W/ 250 OVERRIDE (IP): Performed by: STUDENT IN AN ORGANIZED HEALTH CARE EDUCATION/TRAINING PROGRAM

## 2021-04-21 PROCEDURE — 83735 ASSAY OF MAGNESIUM: CPT

## 2021-04-21 RX ADMIN — FOLIC ACID 1 MG: 1 TABLET ORAL at 06:17

## 2021-04-21 RX ADMIN — THERA TABS 1 TABLET: TAB at 06:17

## 2021-04-21 RX ADMIN — Medication 100 MG: at 06:17

## 2021-04-21 RX ADMIN — ENOXAPARIN SODIUM 40 MG: 40 INJECTION SUBCUTANEOUS at 06:17

## 2021-04-21 ASSESSMENT — LIFESTYLE VARIABLES
HEADACHE, FULLNESS IN HEAD: VERY MILD
VISUAL DISTURBANCES: NOT PRESENT
NAUSEA AND VOMITING: NO NAUSEA AND NO VOMITING
AUDITORY DISTURBANCES: NOT PRESENT
ANXIETY: NO ANXIETY (AT EASE)
AUDITORY DISTURBANCES: NOT PRESENT
HEADACHE, FULLNESS IN HEAD: NOT PRESENT
ORIENTATION AND CLOUDING OF SENSORIUM: ORIENTED AND CAN DO SERIAL ADDITIONS
AGITATION: NORMAL ACTIVITY
HEADACHE, FULLNESS IN HEAD: NOT PRESENT
AUDITORY DISTURBANCES: NOT PRESENT
PAROXYSMAL SWEATS: NO SWEAT VISIBLE
AGITATION: NORMAL ACTIVITY
TOTAL SCORE: 1
AUDITORY DISTURBANCES: NOT PRESENT
TOTAL SCORE: 3
TOTAL SCORE: 0
ORIENTATION AND CLOUDING OF SENSORIUM: ORIENTED AND CAN DO SERIAL ADDITIONS
NAUSEA AND VOMITING: NO NAUSEA AND NO VOMITING
PAROXYSMAL SWEATS: NO SWEAT VISIBLE
VISUAL DISTURBANCES: NOT PRESENT
AGITATION: *
ORIENTATION AND CLOUDING OF SENSORIUM: ORIENTED AND CAN DO SERIAL ADDITIONS
TOTAL SCORE: 2
TREMOR: NO TREMOR
VISUAL DISTURBANCES: NOT PRESENT
NAUSEA AND VOMITING: NO NAUSEA AND NO VOMITING
PAROXYSMAL SWEATS: NO SWEAT VISIBLE
ORIENTATION AND CLOUDING OF SENSORIUM: ORIENTED AND CAN DO SERIAL ADDITIONS
HEADACHE, FULLNESS IN HEAD: NOT PRESENT
PAROXYSMAL SWEATS: NO SWEAT VISIBLE
VISUAL DISTURBANCES: NOT PRESENT
TREMOR: NO TREMOR
TREMOR: NO TREMOR
TREMOR: TREMOR NOT VISIBLE BUT CAN BE FELT, FINGERTIP TO FINGERTIP
NAUSEA AND VOMITING: NO NAUSEA AND NO VOMITING
ANXIETY: NO ANXIETY (AT EASE)
ANXIETY: MILDLY ANXIOUS
AGITATION: NORMAL ACTIVITY
ANXIETY: MILDLY ANXIOUS

## 2021-04-21 ASSESSMENT — GAIT ASSESSMENTS
DEVIATION: NO DEVIATION
DISTANCE (FEET): 500
GAIT LEVEL OF ASSIST: SUPERVISED

## 2021-04-21 ASSESSMENT — COGNITIVE AND FUNCTIONAL STATUS - GENERAL
MOBILITY SCORE: 22
CLIMB 3 TO 5 STEPS WITH RAILING: A LITTLE
SUGGESTED CMS G CODE MODIFIER MOBILITY: CJ
WALKING IN HOSPITAL ROOM: A LITTLE

## 2021-04-21 ASSESSMENT — PAIN DESCRIPTION - PAIN TYPE
TYPE: ACUTE PAIN
TYPE: ACUTE PAIN

## 2021-04-21 NOTE — THERAPY
Physical Therapy   Initial Evaluation     Patient Name: Chula Uribe  Age:  46 y.o., Sex:  female  Medical Record #: [id number]  Today's Date: 4/21/2021          Assessment  Patient is 46 y.o. female admitted with drug overdose. Pt lives with her 17 year old daughter and was independent and working prior. Pt appears to be back to her functional baseline. Pt ambulated around unit with no AD, no weakness or sensory deficits. No further acute PT needs.     Plan    Recommend Physical Therapy for Evaluation only    DC Equipment Recommendations: None  Discharge Recommendations: Anticipate that the patient will have no further physical therapy needs after discharge from the hospital          04/21/21 0840   Prior Living Situation   Prior Services None   Housing / Facility 1 Story House   Steps Into Home 0   Steps In Home 0   Equipment Owned None   Lives with - Patient's Self Care Capacity Child Less than 18 Years of Age   Comments pt lives with her 17 year old daughter   Prior Level of Functional Mobility   Bed Mobility Independent   Transfer Status Independent   Ambulation Independent   Distance Ambulation (Feet)   (community)   Assistive Devices Used None   Comments independent prior    Cognition    Cognition / Consciousness WDL   Comments cooperative   Gait Analysis   Gait Level Of Assist Supervised   Assistive Device None   Distance (Feet) 500   # of Times Distance was Traveled 1   Deviation No deviation   # of Stairs Climbed 0   Weight Bearing Status no restrictions   Comments no LOB   Bed Mobility    Supine to Sit Modified Independent   Sit to Supine Modified Independent   Scooting Modified Independent   Rolling Modified Independent   Functional Mobility   Sit to Stand Supervised   Transfer Method Stand Step   Mobility in hallway in hallway   Anticipated Discharge Equipment and Recommendations   DC Equipment Recommendations None   Discharge Recommendations Anticipate that the patient will have no further physical  therapy needs after discharge from the hospital

## 2021-04-21 NOTE — CONSULTS
PSYCHIATRIC FOLLOW-UP:(established)  *Reason for admission: Possible suicide attempt by overdose, alcohol intoxication/withdrawal      *Legal Hold Status on Admission: On hold           Chart reviewed.         *HPI:  Chula Uribe is a 46 year old female with a PMH significant for anxiety and alcohol use disorder who was admitted for alcohol withdrawal and suspected drug overdose as a suicide attempt. This morning, the patient is awake and alert. She states that she drank too much alcohol and this is what brought her to the ED. Per patient, she had been sober for 3 years and only started drinking again 4 days ago. She states that she was at a party with her daughter and thought that she could drink socially but was unable to stop drinking as recent stressors were weighing on her. She is upset with herself for this lapse in her sobriety. She reports that she took her normal 4, 25 mg hydroxyzine to help her sleep. Per patient, she takes 2 hydroxyzine to help her sleep at night. If this doesn't work, she will take 2 more pills. She denies taking additional pills and denies that this was a suicide attempt. Although she has been more depressed since the death of her sister about 1 month ago, she denies SI/SH and is happy she is alive. She sees her therapist every Monday and this helps with her anxiety as well as dealing with her sister's death. Her anxiety does seem to be quite a problem for her. She would be interested in a medication that helps with her anxiety throughout the day rather than just with sleep.  She was prescribed an antidepressant, but never filled the prescription.  She does not know the name of the antidepressant. she denies HI/AVH. She is eager to go home and states that she will be safe there. She is worried about missing work because of being in the hospital. The patient feels that she will be able to refrain from drinking again as this was just a short lapse in her sobriety and she now knows  "she is unable to drink even just socially.      With the patient's permission, I spoke with her daughter, Imani. She states that she was concerned as her mother was slurring her speech and saying to \"let her go\", not wanting the daughter to call for an ambulance. The daughter interpreted this as the patient wanting to end her life. Daughter states that her mother told her that she took a handful of pills and drank rubbing alcohol, although she did not actually see the patient doing these things. Now, the daughter states that she thinks these things were just a result of the the patient drinking too much. She feels the patient would be safe at home. Her mother started drinking again 4 days ago after having been doing really well for a long time. She has been sad since her sister passed away a month ago, but has still been going to work and doing all her normal activities.               *Psychiatric Examination:  Vitals:   Vitals:    04/21/21 1219   BP: 135/89   Pulse: (!) 105   Resp: 16   Temp: 36.8 °C (98.2 °F)   SpO2: 99%       General Appearance: Fair grooming, appears stated age, calm and cooperative with fair eye contact  Abnormal Movements: None  Gait and Posture: Normal  Speech: Normal volume, tone and rhythm  Thought processes: Linear   associations: No loose association  Abnormal or Psychotic Thoughts: No psychosis observed or endorsed  Judgement and Insight: Limited/limited  Orientation: Grossly oriented  Recent and Remote Memory: Appears intact  Attention Span and Concentration: Intact  Language: Fluid  Fund of Knowledge: Not tested  Mood and Affect: Depressed and anxious; patient appears depressed and is very tearful during evaluation  SI/HI: Denies       *Labs personally reviewed:   Recent Results (from the past 24 hour(s))   CBC with Differential    Collection Time: 04/21/21  5:45 AM   Result Value Ref Range    WBC 6.6 4.8 - 10.8 K/uL    RBC 3.79 (L) 4.20 - 5.40 M/uL    Hemoglobin 12.2 12.0 - 16.0 g/dL "    Hematocrit 36.5 (L) 37.0 - 47.0 %    MCV 96.3 81.4 - 97.8 fL    MCH 32.2 27.0 - 33.0 pg    MCHC 33.4 (L) 33.6 - 35.0 g/dL    RDW 50.8 (H) 35.9 - 50.0 fL    Platelet Count 266 164 - 446 K/uL    MPV 9.7 9.0 - 12.9 fL    Neutrophils-Polys 49.60 44.00 - 72.00 %    Lymphocytes 39.50 22.00 - 41.00 %    Monocytes 6.20 0.00 - 13.40 %    Eosinophils 3.00 0.00 - 6.90 %    Basophils 1.40 0.00 - 1.80 %    Immature Granulocytes 0.30 0.00 - 0.90 %    Nucleated RBC 0.00 /100 WBC    Neutrophils (Absolute) 3.27 2.00 - 7.15 K/uL    Lymphs (Absolute) 2.61 1.00 - 4.80 K/uL    Monos (Absolute) 0.41 0.00 - 0.85 K/uL    Eos (Absolute) 0.20 0.00 - 0.51 K/uL    Baso (Absolute) 0.09 0.00 - 0.12 K/uL    Immature Granulocytes (abs) 0.02 0.00 - 0.11 K/uL    NRBC (Absolute) 0.00 K/uL   MAGNESIUM    Collection Time: 04/21/21  5:47 AM   Result Value Ref Range    Magnesium 1.9 1.5 - 2.5 mg/dL   Comp Metabolic Panel (CMP)    Collection Time: 04/21/21  5:47 AM   Result Value Ref Range    Sodium 136 135 - 145 mmol/L    Potassium 4.0 3.6 - 5.5 mmol/L    Chloride 107 96 - 112 mmol/L    Co2 23 20 - 33 mmol/L    Anion Gap 6.0 (L) 7.0 - 16.0    Glucose 81 65 - 99 mg/dL    Bun 10 8 - 22 mg/dL    Creatinine 0.79 0.50 - 1.40 mg/dL    Calcium 8.5 8.5 - 10.5 mg/dL    AST(SGOT) 28 12 - 45 U/L    ALT(SGPT) 29 2 - 50 U/L    Alkaline Phosphatase 96 30 - 99 U/L    Total Bilirubin 1.1 0.1 - 1.5 mg/dL    Albumin 3.5 3.2 - 4.9 g/dL    Total Protein 6.2 6.0 - 8.2 g/dL    Globulin 2.7 1.9 - 3.5 g/dL    A-G Ratio 1.3 g/dL   Lipid Profile (Lipid Panel) Fasting    Collection Time: 04/21/21  5:47 AM   Result Value Ref Range    Cholesterol,Tot 133 100 - 199 mg/dL    Triglycerides 144 0 - 149 mg/dL    HDL 55 >=40 mg/dL    LDL 49 <100 mg/dL   ESTIMATED GFR    Collection Time: 04/21/21  5:47 AM   Result Value Ref Range    GFR If African American >60 >60 mL/min/1.73 m 2    GFR If Non African American >60 >60 mL/min/1.73 m 2         Assessment: Patient is very tearful and  depressed during evaluation.  She continues to deny suicide attempt, however she has multiple stressors, and has been recently coping with alcohol.  She is not on an antidepressant at home, but is following up with therapist.  Patient needs further psychiatric stabilization and observation for safe discharge.  I agree with patient going to our  today.      Dx:  Alcohol intoxication/withdrawal, resolved  unspecified anxiety disorder  Alcohol use disorder, severe  unspecified depressive disorder   R/o grief   R/o adjustment disorder  Rule out substance-induced mood disorder     Medical:   Alcohol withdrawal   Hyperglycemia   Tachycardia   Drug overdose    Plan:  1- Legal hold:extended  2- Psychotropic medications: will deffer to receiving facility   3- Pt is being transferred to [facility] today.  4- Psychiatry  signing off.     Thank you for the consult.       Sitter:yes  Phone:yes  Visitors:yes  Personal belongings: personal phone only    This note was created using voice recognition software (Dragon). The accuracy of the dictation is limited by the abilities of the software. I have reviewed the note prior to signing. However, error related to voice recognition software and /or scribes may still exist and should be interpreted within the appropriate context.

## 2021-04-21 NOTE — DISCHARGE PLANNING
Anticipated Discharge Disposition: inpatient psychiatric facility- [facility]    Action:   0832 pt medically cleared. Medical clearance section completed and signed by Dr. Bill. Faxed legal hold paperwork to [facility] for blanket inpatient psychiatric referral  0938: received call from Fanny at [facility] stating pt's accepted by Dr. Porter and can accept transfer today. Faxed [facility] to [facility]  0954: transfer packet w/ COBRA completed. Updated BSRN.  0985: notified daughter Imani of transfer [phone]    Barriers to Discharge: legal hold    Plan: [facility] via [facility] 12:00 today

## 2021-04-21 NOTE — CARE PLAN
Problem: Communication  Goal: The ability to communicate needs accurately and effectively will improve  Outcome: PROGRESSING AS EXPECTED     Problem: Safety  Goal: Will remain free from injury  Outcome: PROGRESSING AS EXPECTED  Safety sitter at bedside. Garbage cans, linen, sharps, and harmful items removed from room. Plastic tray ordered.     Problem: Infection  Goal: Will remain free from infection  Outcome: PROGRESSING AS EXPECTED     Problem: Knowledge Deficit  Goal: Knowledge of disease process/condition, treatment plan, diagnostic tests, and medications will improve  Outcome: PROGRESSING AS EXPECTED  Goal: Knowledge of the prescribed therapeutic regimen will improve  Outcome: PROGRESSING AS EXPECTED     Problem: Discharge Barriers/Planning  Goal: Patient's continuum of care needs will be met  Outcome: PROGRESSING AS EXPECTED   Plan for pt to be discharged to inpatient psych today.  Problem: Safety:  Goal: Will remain free from injury  Outcome: PROGRESSING AS EXPECTED

## 2021-04-21 NOTE — PROGRESS NOTES
PT transported to [facility] with [facility]. IV Dc'd. Discharge instructions provided to pt. Pt verbalized understanding. Pt states all questions have been answered. Copy of discharge paperwork provided to pt, and put in COBRA packet and sent with [facility].  All personal belongings send with [facility]. Pt escorted off of the unit walking with [facility] without incident. Transportation provided by [facility] to [facility].

## 2021-04-21 NOTE — PROGRESS NOTES
Assumed care of pt. Bedside report received from day shift RNFozia. Pt aaox4 , on RA no signs of distress noted at this time. Tele box is on and rhythm verifed. POC discussed with pt and pt verbalized no questions at this time. Bed low and locked. Treaded socks on. Sitter present at bedside. All needs met at this time. Will continue POC

## 2021-04-21 NOTE — DISCHARGE SUMMARY
Discharge Summary    CHIEF COMPLAINT ON ADMISSION  Chief Complaint   Patient presents with   • Drug Overdose     Patient had EMS called by daughter, upon returning home, pt found very altered, A&Ox3, slurring speech. Pt is a known alcoholic but pt told her daughter that she took hyrdoxyzine, benadryl, and pt stated at home she drank isopropyl alcohol. Pt is now denying taking any medications or drinking today. Denies SI/HI. Pt's daughter  1 month ago and per family pt has been struggling.        Reason for Admission  ems     CODE STATUS  Full Code    HPI & HOSPITAL COURSE  This is a 46 year old female with PMHx of who presented 2021 with altered mental status noted secondary to drug overdose. Patient ingested rubbing alcohol, benadryl and atarax (quantity unknown). Of note, patient had her sister die, less than one month ago.     UDS negative, salicylates, tylenol negative, alcohol level 121 on admission.     Poison control advises 6-8 hours observation.    Patient has been evaluated by psychiatry, they recommend extension of her legal hold.  And transfer to inpatient psychiatry unit.  Patient is medically clear for transfer.    Therefore, she is discharged in good and stable condition to an inpatient rehabilitation hospital.    The patient met 2-midnight criteria for an inpatient stay at the time of discharge.      FOLLOW UP ITEMS POST DISCHARGE  PCP  Psychiatry    DISCHARGE DIAGNOSES  Principal Problem:    Intentional drug overdose (HCC) POA: Yes  Resolved Problems:    Tachycardia POA: Yes    Hyperglycemia POA: Yes    Elevated ETOH level POA: Yes      FOLLOW UP  Future Appointments   Date Time Provider Department Center   2021  5:40 PM [facility] [address] [facility] [address] [facility] [address]   2021  3:00 PM Ilia Zhao M.D. DANIELA [address] [place] [address], A.P.R.N.  [address] [place] [id number] [phone]    In 2 weeks        MEDICATIONS ON DISCHARGE     Medication List      CONTINUE taking these  medications      Instructions   Ventolin  (90 Base) MCG/ACT Aers inhalation aerosol  Generic drug: albuterol   Inhale 2 Puffs every four hours as needed.  Dose: 2 Puff        STOP taking these medications    hydrOXYzine HCl 25 MG Tabs  Commonly known as: ATARAX            Allergies  No Known Allergies    DIET  Orders Placed This Encounter   Procedures   • Diet Order Diet: Regular; Tray Modifications (optional): No Sharps (Paperware)     Paperware only on meal tray.     Standing Status:   Standing     Number of Occurrences:   1     Order Specific Question:   Diet:     Answer:   Regular [1]     Order Specific Question:   Tray Modifications (optional)     Answer:   No Sharps (Paperware)       ACTIVITY  As tolerated.  Weight bearing as tolerated    LINES, DRAINS, AND WOUNDS  This is an automated list. Peripheral IVs will be removed prior to discharge.  Peripheral IV 04/20/21 20 G Left Antecubital (Active)   Site Assessment Clean;Dry;Intact 04/21/21 0000   Dressing Type Transparent 04/21/21 0000   Line Status Flushed;Scrubbed the hub prior to access;Saline locked 04/21/21 0000   Dressing Status Clean;Dry;Intact 04/21/21 0000   Dressing Intervention Initial dressing 04/21/21 0000   Infiltration Grading ([facility] 0 04/21/21 0000   Phlebitis Scale ([facility]) 0 04/21/21 0000          Peripheral IV 04/20/21 20 G Left Antecubital (Active)   Site Assessment Clean;Dry;Intact 04/21/21 0000   Dressing Type Transparent 04/21/21 0000   Line Status Flushed;Scrubbed the hub prior to access;Saline locked 04/21/21 0000   Dressing Status Clean;Dry;Intact 04/21/21 0000   Dressing Intervention Initial dressing 04/21/21 0000   [facility] 0 04/21/21 0000   Phlebitis Scale ([facility]) 0 04/21/21 0000               MENTAL STATUS ON TRANSFER             CONSULTATIONS  Psychiatry    PROCEDURES  None    LABORATORY  Lab Results   Component Value Date    SODIUM 135 04/20/2021    POTASSIUM 3.5 (L) 04/20/2021     CHLORIDE 101 04/20/2021    CO2 20 04/20/2021    GLUCOSE 121 (H) 04/20/2021    BUN 14 04/20/2021    CREATININE 1.01 04/20/2021        Lab Results   Component Value Date    WBC 6.6 04/21/2021    HEMOGLOBIN 12.2 04/21/2021    HEMATOCRIT 36.5 (L) 04/21/2021    PLATELETCT 266 04/21/2021      CT-HEAD W/O   Final Result      No acute intracranial abnormality.      DX-CHEST-PORTABLE (1 VIEW)   Final Result      No acute cardiopulmonary abnormality.        Total time of the discharge process exceeds 40 minutes.

## 2021-04-21 NOTE — DISCHARGE PLANNING
Received Transport Form @ 6783  Spoke to Maldonado VÁZQUEZ    Transport is scheduled for 4/21 @1200 going to [facility].    Agency/Facility Name: [facility]  Spoke To: Andreina  Outcome: Informed of transport time.

## 2021-04-21 NOTE — CARE PLAN
Problem: Safety:  Goal: Will remain free from injury  Outcome: PROGRESSING AS EXPECTED  Pt remains free from falls at this time. Safety precautions in place. Pt educated on calling for assistance when needed.       Problem: Psychosocial Needs:  Goal: Ability to identify and develop effective coping behavior will improve  Outcome: PROGRESSING AS EXPECTED  Goal: Ability to verbalize positive feelings about self will improve  Outcome: PROGRESSING AS EXPECTED  Encouraging pt to voice feelings.     Problem: Health Behavior:  Goal: Ability to identify appropriate support needs will improve  Outcome: PROGRESSING AS EXPECTED  Goal: Ability to identify and utilize available support systems will improve  Outcome: PROGRESSING AS EXPECTED  Reduced anxiety levels. Reorient pt if needed.

## 2021-04-21 NOTE — DISCHARGE INSTRUCTIONS
Discharge Instructions    Discharged to [facility] by ambulance with [facility]. Discharged via ambulance, hospital escort: Yes.  Special equipment needed: Not Applicable    Be sure to schedule a follow-up appointment with your primary care doctor or any specialists as instructed.     Discharge Plan:   Diet Plan: Discussed  Activity Level: Discussed  Confirmed Follow up Appointment: Appointment Scheduled  Confirmed Symptoms Management: Discussed  Medication Reconciliation Updated: Yes    I understand that a diet low in cholesterol, fat, and sodium is recommended for good health. Unless I have been given specific instructions below for another diet, I accept this instruction as my diet prescription.   Other diet: Regular     Special Instructions: None    · Is patient discharged on Warfarin / Coumadin?   No     Depression / Suicide Risk    As you are discharged from this [facility], it is important to learn how to keep safe from harming yourself.    Recognize the warning signs:  · Abrupt changes in personality, positive or negative- including increase in energy   · Giving away possessions  · Change in eating patterns- significant weight changes-  positive or negative  · Change in sleeping patterns- unable to sleep or sleeping all the time   · Unwillingness or inability to communicate  · Depression  · Unusual sadness, discouragement and loneliness  · Talk of wanting to die  · Neglect of personal appearance   · Rebelliousness- reckless behavior  · Withdrawal from people/activities they love  · Confusion- inability to concentrate     If you or a loved one observes any of these behaviors or has concerns about self-harm, here's what you can do:  · Talk about it- your feelings and reasons for harming yourself  · Remove any means that you might use to hurt yourself (examples: pills, rope, extension cords, firearm)  · Get professional help from the community (Mental Health, Substance Abuse, psychological  counseling)  · Do not be alone:Call your Safe Contact- someone whom you trust who will be there for you.  · Call your local CRISIS HOTLINE 165-1843 or 904-954-0864  · Call your local Children's Mobile Crisis Response Team [place] (502) 218-3255 or www.[employer]  · Call the toll free National Suicide Prevention Hotlines   · National Suicide Prevention Lifeline 815-589-OHJW (6546)  · National Hope Line Network 800-SUICIDE (455-9794)      Intentional Drug Overdose    An intentional drug overdose refers to taking too much of a drug to get high or for the purpose of harming yourself. An overdose can occur with illegal drugs, prescription medicines, or over-the-counter (OTC) medicines.  The effects of an intentional drug overdose can be mild, dangerous, or even deadly.  What are the causes?  A drug overdose is caused by taking too much of a drug. Drugs that commonly cause overdose include:  · Pain medicines.  · Medicines to treat mental health conditions.  · Illegal drugs.  What increases the risk?  The risk of a drug overdose is higher for someone who:  · Takes illegal drugs.  · Takes a drug and drinks alcohol.  · Takes multiple drugs.  · Has a mental health condition.  What are the signs or symptoms?  Symptoms of a drug overdose depend on the drug and the amount that was taken. Common danger signs include:  · Behavior changes.  · Sleepiness.  · Slowed breathing.  · Nausea and vomiting.  · Seizures.  · Changes in eye pupil size. The pupil may be very large or very small.  If there are signs and symptoms of very low blood pressure (shock) from an overdose, emergency treatment is required. These include:  · Cold and clammy skin.  · Pale skin.  · Blue lips.  · Very slow breathing.  · Extreme sleepiness.  · Loss of consciousness.  How is this diagnosed?  This condition may be diagnosed based on your symptoms. It is important to tell your health care provider:  · All of the drugs that you took.  · When you took  the drugs.  · Whether you were drinking alcohol.  Your health care provider will do a physical exam. This exam may include:  · Checking and monitoring your heart rate and rhythm, your temperature, and your blood pressure (vital signs).  · Checking your breathing and oxygen level.  You may also have tests, including urine or blood tests.  How is this treated?  This condition requires immediate medical treatment and hospitalization. Treatment will focus on supporting normal body functions, such as breathing, and preventing complications. Treatment may include:  · Giving fluids and electrolytes through an IV.  · Inserting a breathing tube in your airway to help you breathe.  · Passing a tube through your nose and into your stomach (nasogastric tube, NG tube) to wash out your stomach.  · Filtering your blood through an artificial kidney machine (hemodialysis).  · Ongoing counseling and mental health support.  · Giving medicines that:  ? Make you vomit.  ? Absorb any medicine that is left in your digestive system.  ? Block or reverse the effect of the drug that caused the overdose (antidote).  Follow these instructions at home:  Medicines  · Take over-the-counter and prescription medicines only as told by your health care provider. Always ask your health care provider about possible side effects of any new drug that you start taking.  · Keep a list of all the drugs that you take, including over-the-counter medicines. Bring this list with you to all your medical visits.  Alcohol use  · Do not drink alcohol if:  ? Your health care provider tells you not to drink.  ? You are pregnant, may be pregnant, or are planning to become pregnant.  · If you drink alcohol, limit how much you have:  ? 0-1 drink a day for women.  ? 0-2 drinks a day for men.  · Be aware of how much alcohol is in your drink. In the [place] one drink equals one typical bottle of beer (12 oz), one-half glass of wine (5 oz), or one shot of hard liquor (1½  oz).  General instructions    · Drink enough fluid to keep your urine pale yellow.  · If you are working with a counselor or mental health professional, make sure to follow his or her instructions.  · Keep all follow-up visits as told by your health care provider. This is important.  Where to find support  · If you think that you are addicted to a drug or that you have a problem with drug use, you may benefit from receiving support for quitting from a local support group or counselor. Ask your health care provider for a referral to these resources in your area.  How is this prevented?  · Get help if you are struggling with:  ? Alcohol or drug use.  ? Depression or another mental health problem.  · Keep the phone number of your local poison control center near your phone or on your cell phone.  · Read the drug inserts that come with your medicines.  · Do not use illegal drugs.  · Do not drink alcohol when taking drugs.  · Do not take medicines that are not prescribed for you.  Contact a health care provider if:  · Your symptoms return.  · You develop new symptoms or side effects when you take medicines.  Get help right away if:  · You think that you or someone else may have taken too much of a drug. The hotline of the American Association of Poison Control Centers is (280) 514-7110.  · You or someone else is having symptoms of a drug overdose.  · You have serious thoughts about hurting yourself or others.  · You become confused.  · You have:  ? Chest pain.  ? Trouble breathing.  ? A loss of consciousness.  Drug overdose is an emergency. Do not wait to see if the symptoms will go away. Get medical help right away. Call your local emergency services (061 in the [place] Do not drive yourself to the hospital.  If you ever feel like you may hurt yourself or others, or have thoughts about taking your own life, get help right away. You can go to the nearest emergency department or call:  · Your local emergency services (584  in the [place]  · A suicide crisis helpline, such as the National Suicide Prevention Lifeline at 1-666.845.9574. This is open 24 hours a day.  Summary  · A drug overdose happens when you take too much of a drug.  · An overdose can occur with illegal drugs, prescription medicines, or over-the-counter (OTC) medicines.  · This condition requires immediate medical treatment and hospitalization.  This information is not intended to replace advice given to you by your health care provider. Make sure you discuss any questions you have with your health care provider.  Document Released: 05/03/2016 Document Revised: 02/05/2019 Document Reviewed: 02/05/2019  [employer] Patient Education © 2020 [employer]        Hyperglycemia  Hyperglycemia is when the sugar (glucose) level in your blood is too high. It may not cause symptoms. If you do have symptoms, they may include warning signs, such as:  · Feeling more thirsty than normal.  · Hunger.  · Feeling tired.  · Needing to pee (urinate) more than normal.  · Blurry eyesight (vision).  You may get other symptoms as it gets worse, such as:  · Dry mouth.  · Not being hungry (loss of appetite).  · Fruity-smelling breath.  · Weakness.  · Weight gain or loss that is not planned. Weight loss may be fast.  · A tingling or numb feeling in your hands or feet.  · Headache.  · Skin that does not bounce back quickly when it is lightly pinched and released (poor skin turgor).  · Pain in your belly (abdomen).  · Cuts or bruises that heal slowly.  High blood sugar can happen to people who do or do not have diabetes. High blood sugar can happen slowly or quickly, and it can be an emergency.  Follow these instructions at home:  General instructions  · Take over-the-counter and prescription medicines only as told by your doctor.  · Do not use products that contain nicotine or tobacco, such as cigarettes and e-cigarettes. If you need help quitting, ask your doctor.  · Limit alcohol intake to no more  than 1 drink per day for nonpregnant women and 2 drinks per day for men. One drink equals 12 oz of beer, 5 oz of wine, or 1½ oz of hard liquor.  · Manage stress. If you need help with this, ask your doctor.  · Keep all follow-up visits as told by your doctor. This is important.  Eating and drinking    · Stay at a healthy weight.  · Exercise regularly, as told by your doctor.  · Drink enough fluid, especially when you:  ? Exercise.  ? Get sick.  ? Are in hot temperatures.  · Eat healthy foods, such as:  ? Low-fat (lean) proteins.  ? Complex carbs (complex carbohydrates), such as whole wheat bread or brown rice.  ? Fresh fruits and vegetables.  ? Low-fat dairy products.  ? Healthy fats.  · Drink enough fluid to keep your pee (urine) clear or pale yellow.  If you have diabetes:    · Make sure you know the symptoms of hyperglycemia.  · Follow your diabetes management plan, as told by your doctor. Make sure you:  ? Take insulin and medicines as told.  ? Follow your exercise plan.  ? Follow your meal plan. Eat on time. Do not skip meals.  ? Check your blood sugar as often as told. Make sure to check before and after exercise. If you exercise longer or in a different way than you normally do, check your blood sugar more often.  ? Follow your sick day plan whenever you cannot eat or drink normally. Make this plan ahead of time with your doctor.  · Share your diabetes management plan with people in your workplace, school, and household.  · Check your urine for ketones when you are ill and as told by your doctor.  · Carry a card or wear jewelry that says that you have diabetes.  Contact a doctor if:  · Your blood sugar level is higher than 240 mg/dL (13.3 mmol/L) for 2 days in a row.  · You have problems keeping your blood sugar in your target range.  · High blood sugar happens often for you.  Get help right away if:  · You have trouble breathing.  · You have a change in how you think, feel, or act (mental status).  · You  feel sick to your stomach (nauseous), and that feeling does not go away.  · You cannot stop throwing up (vomiting).  These symptoms may be an emergency. Do not wait to see if the symptoms will go away. Get medical help right away. Call your local emergency services (911 in the [place] Do not drive yourself to the hospital.  Summary  · Hyperglycemia is when the sugar (glucose) level in your blood is too high.  · High blood sugar can happen to people who do or do not have diabetes.  · Make sure you drink enough fluids, eat healthy foods, and exercise regularly.  · Contact your doctor if you have problems keeping your blood sugar in your target range.  This information is not intended to replace advice given to you by your health care provider. Make sure you discuss any questions you have with your health care provider.  Document Released: 10/15/2010 Document Revised: 09/04/2017 Document Reviewed: 09/04/2017  Elsevier Patient Education © 2020 [employer]

## 2021-04-22 LAB
ACETONE SERPL-MCNC: <5 MG/DL
ETHANOL SERPL GC-MCNC: 119 MG/DL
ISOPROPANOL SERPL-MCNC: <5 MG/DL
METHANOL SERPL-MCNC: <5 MG/DL

## 2021-04-26 ENCOUNTER — HOSPITAL ENCOUNTER (OUTPATIENT)
Dept: RADIOLOGY | Facility: MEDICAL CENTER | Age: 46
End: 2021-04-26
Attending: NURSE PRACTITIONER
Payer: COMMERCIAL

## 2021-04-26 DIAGNOSIS — Z12.31 VISIT FOR SCREENING MAMMOGRAM: ICD-10-CM

## 2021-04-26 PROCEDURE — 77063 BREAST TOMOSYNTHESIS BI: CPT

## 2021-05-19 ENCOUNTER — OFFICE VISIT (OUTPATIENT)
Dept: MEDICAL GROUP | Facility: IMAGING CENTER | Age: 46
End: 2021-05-19
Payer: COMMERCIAL

## 2021-05-19 VITALS
DIASTOLIC BLOOD PRESSURE: 80 MMHG | OXYGEN SATURATION: 98 % | RESPIRATION RATE: 15 BRPM | SYSTOLIC BLOOD PRESSURE: 118 MMHG | HEART RATE: 89 BPM | HEIGHT: 65 IN | BODY MASS INDEX: 29.16 KG/M2 | TEMPERATURE: 97.2 F | WEIGHT: 175 LBS

## 2021-05-19 DIAGNOSIS — F43.21 GRIEF: ICD-10-CM

## 2021-05-19 DIAGNOSIS — F41.9 ANXIETY: ICD-10-CM

## 2021-05-19 DIAGNOSIS — F10.11 HISTORY OF ALCOHOL ABUSE: ICD-10-CM

## 2021-05-19 DIAGNOSIS — F43.9 SITUATIONAL STRESS: ICD-10-CM

## 2021-05-19 PROCEDURE — 99213 OFFICE O/P EST LOW 20 MIN: CPT | Performed by: NURSE PRACTITIONER

## 2021-05-19 RX ORDER — HYDROXYZINE PAMOATE 50 MG/1
CAPSULE ORAL
COMMUNITY
Start: 2021-04-26

## 2021-05-19 RX ORDER — TRAZODONE HYDROCHLORIDE 50 MG/1
50 TABLET ORAL DAILY
COMMUNITY
Start: 2021-04-26

## 2021-05-19 ASSESSMENT — PATIENT HEALTH QUESTIONNAIRE - PHQ9: CLINICAL INTERPRETATION OF PHQ2 SCORE: 0

## 2021-05-19 ASSESSMENT — ANXIETY QUESTIONNAIRES
3. WORRYING TOO MUCH ABOUT DIFFERENT THINGS: NOT AT ALL
2. NOT BEING ABLE TO STOP OR CONTROL WORRYING: NOT AT ALL
7. FEELING AFRAID AS IF SOMETHING AWFUL MIGHT HAPPEN: NOT AT ALL
GAD7 TOTAL SCORE: 0
4. TROUBLE RELAXING: NOT AT ALL
IF YOU CHECKED OFF ANY PROBLEMS ON THIS QUESTIONNAIRE, HOW DIFFICULT HAVE THESE PROBLEMS MADE IT FOR YOU TO DO YOUR WORK, TAKE CARE OF THINGS AT HOME, OR GET ALONG WITH OTHER PEOPLE: SOMEWHAT DIFFICULT
1. FEELING NERVOUS, ANXIOUS, OR ON EDGE: NOT AT ALL
5. BEING SO RESTLESS THAT IT IS HARD TO SIT STILL: NOT AT ALL
6. BECOMING EASILY ANNOYED OR IRRITABLE: NOT AT ALL

## 2021-05-19 ASSESSMENT — FIBROSIS 4 INDEX: FIB4 SCORE: 0.88

## 2021-05-19 NOTE — PROGRESS NOTES
Subjective:   CC: Hospital Follow-up    HPI:   Chula presents today to discuss:    Anxiety  Reports that she sought care at local emergency room on April 21, 2021. States that she has recently lost her sister to Covid and has also had an aunt diagnosed with lung cancer. States after these events she began drinking again and was binge drinking. States that she was taken to the hospital due to consciousness altered. States that she did not attempt suicide, but she just relapsed on her drinking. States at this time she is doing better and she is seeing a counselor every week. States that she continues to grieve the loss of her sister due to being very close to sister. States that she initially was not sleeping well, but has improved her sleeping since event. States that she is not using trazodone daily. Reports that she is also stressed due to losing her job this summer. States her company is closing and she will need to start looking for a new job. Reports that she will need a new job by September 1, 2021.    Depression Screening  Little interest or pleasure in doing things?  0 - not at all  Feeling down, depressed , or hopeless? 0 - not at all  Patient Health Questionnaire Score: 0    If depressive symptoms identified deferred to follow up visit unless specifically addressed in assesment and plan.    Interpretation of PHQ-9 Total Score   Score Severity   1-4 Minimal Depression   5-9 Mild Depression   10-14 Moderate Depression   15-19 Moderately Severe Depression   20-27 Severe Depression    FISH-7 Questionnaire  Feeling nervous, anxious, or on edge: Not at all  Not being able to sop or control worrying: Not at all  Worrying too much about different things: Not at all  Trouble relaxing: Not at all  Being so restless that it's hard to sit still: Not at all  Becoming easily annoyed or irritable: Not at all  Feeling afraid as if something awful might happen: Not at all  Total: 0    Interpretation of FISH 7 Total Score    Score Severity :  0-4 No Anxiety   5-9 Mild Anxiety  10-14 Moderate Anxiety  15-21 Severe Anxiety      Past Medical History:   Diagnosis Date   • Anxiety      Social History     Tobacco Use   • Smoking status: Never Smoker   • Smokeless tobacco: Never Used   Vaping Use   • Vaping Use: Never used   Substance Use Topics   • Alcohol use: Yes     Alcohol/week: 3.0 oz     Types: 5 Shots of liquor per week     Comment: pt is denying using alcohol   • Drug use: No     Current Outpatient Medications Ordered in Epic   Medication Sig Dispense Refill   • traZODone (DESYREL) 50 MG Tab Take 50 mg by mouth every day.     • hydrOXYzine pamoate (VISTARIL) 50 MG Cap TAKE 1 CAPSULE BY MOUTH TWICE DAILY AS NEEDED FOR ANXIETY     • VENTOLIN  (90 Base) MCG/ACT Aero Soln inhalation aerosol Inhale 2 Puffs every four hours as needed. (Patient not taking: Reported on 5/19/2021)       No current [facility]-ordered facility-administered medications on file.     Allergies:  Patient has no known allergies.    Health Maintenance: Completed.    ROS:  Constitutional: Denies fever, chills, fatigue, night sweats, weight loss/gain and/or malaise.  Intentional weight loss.    HENT: Denies nasal congestion, sore throat, hearing loss, enlarged thyroid, or difficulty swallowing.   Eyes: Denies changes in vision, pain. Wears corrective wear.   Respiratory: Denies cough, SOB at rest or activity.    Cardiovascular: Denies tachycardia, chest pain, palpitations, or leg swelling.   Gastrointestinal: Denies  N/V/D/C, abdominal pain, loss appetite, reflux, or hematochezia. Genitourinary: Denies difficulty voiding, dysuria, nocturia, or hematuria.   Skin: Negative for rash or worrisome moles.   Neurological: Negative for dizziness, focal weakness and headaches.   Endo/Heme/Allergies: Denies bruise/bleed easily, allergies.   Psychiatric/Behavioral:  History of depression.  Anxiety and difficulty sleeping, see HPI.    Objective:   Exam:  /80 (BP Location:  "Left arm, Patient Position: Sitting, BP Cuff Size: Adult)   Pulse 89   Temp 36.2 °C (97.2 °F) (Temporal)   Resp 15   Ht 1.651 m (5' 5\")   Wt 79.4 kg (175 lb)   LMP  (LMP Unknown)   SpO2 98%   BMI 29.12 kg/m²  Body mass index is 29.12 kg/m².  General: Normal appearing. No distress.  HEENT: Normocephalic. Eyes conjunctiva clear lids without ptosis, PERRLA, ears normal shape and contour. Oral, ears, and nasal examine deferred due to current COVID-19 outbreak, no acute concerns. Patient wore a mask during visit.  Neck:Trachea midline, no masses, no thyromegaly.  Pulmonary:  Unlabored respiratory effort, no cough.  Cardiovascular: Regular rate and rhythm without murmur. Pedal and radial pulses are intact and equal bilaterally.  Abdomen: Soft, nontender, nondistended. Normal bowel sounds. Liver and spleen are not palpable  Neurologic: Grossly non-focal.  Skin: Warm and dry.  No obvious lesions.  Musculoskeletal: Normal gait. No extremity cyanosis, clubbing, or edema.  Psych: Normal mood and affect. Alert and oriented x3. Judgment and insight is normal.    Assessment & Plan:   1. Grief  2. History of alcohol abuse  3. Anxiety  4. Situational stress  This is a chronic stable condition. Discussed with patient continuing to not drink alcohol, verbalized understanding and willingness. Instructed to continue seeing counselor to help her with her grieving and stress from losing her job in the near future, verbalized understanding willingness. Discussed with patient taking trazodone as prescribed as needed, verbalized understanding and willingness.  Discussed the overall benefit of a well-balanced diet, regular exercise, and stress management, verbalized understanding.    Return in about 3 months (around 8/19/2021).    Please note that this dictation was created using voice recognition software. I have made every reasonable attempt to correct obvious errors, but I expect that there are errors of grammar and possibly " content that I did not discover before finalizing the note.

## 2021-05-22 NOTE — ASSESSMENT & PLAN NOTE
Reports that she sought care at local emergency room on April 21, 2021. States that she has recently lost her sister to Covid and has also had an aunt diagnosed with lung cancer. States after these events she began drinking again and was binge drinking. States that she was taken to the hospital due to consciousness altered. States that she did not attempt suicide, but she just relapsed on her drinking. States at this time she is doing better and she is seeing a counselor every week. States that she continues to grieve the loss of her sister due to being very close to sister. States that she initially was not sleeping well, but has improved her sleeping since event. States that she is not using trazodone daily. Reports that she is also stressed due to losing her job this summer. States her company is closing and she will need to start looking for a new job. Reports that she will need a new job by September 1, 2021.    Depression Screening  Little interest or pleasure in doing things?  0 - not at all  Feeling down, depressed , or hopeless? 0 - not at all  Patient Health Questionnaire Score: 0    If depressive symptoms identified deferred to follow up visit unless specifically addressed in assesment and plan.    Interpretation of PHQ-9 Total Score   Score Severity   1-4 Minimal Depression   5-9 Mild Depression   10-14 Moderate Depression   15-19 Moderately Severe Depression   20-27 Severe Depression    FISH-7 Questionnaire  Feeling nervous, anxious, or on edge: Not at all  Not being able to sop or control worrying: Not at all  Worrying too much about different things: Not at all  Trouble relaxing: Not at all  Being so restless that it's hard to sit still: Not at all  Becoming easily annoyed or irritable: Not at all  Feeling afraid as if something awful might happen: Not at all  Total: 0    Interpretation of FISH 7 Total Score   Score Severity :  0-4 No Anxiety   5-9 Mild Anxiety  10-14 Moderate Anxiety  15-21 Severe  Anxiety

## 2022-11-22 ENCOUNTER — HOSPITAL ENCOUNTER (EMERGENCY)
Facility: MEDICAL CENTER | Age: 47
End: 2022-11-23
Attending: EMERGENCY MEDICINE
Payer: COMMERCIAL

## 2022-11-22 VITALS
HEART RATE: 107 BPM | RESPIRATION RATE: 14 BRPM | OXYGEN SATURATION: 96 % | BODY MASS INDEX: 29.16 KG/M2 | TEMPERATURE: 96.9 F | HEIGHT: 65 IN | SYSTOLIC BLOOD PRESSURE: 131 MMHG | WEIGHT: 175 LBS | DIASTOLIC BLOOD PRESSURE: 59 MMHG

## 2022-11-22 DIAGNOSIS — F32.A DEPRESSION, UNSPECIFIED DEPRESSION TYPE: ICD-10-CM

## 2022-11-22 LAB
ALBUMIN SERPL BCP-MCNC: 4.3 G/DL (ref 3.2–4.9)
ALBUMIN/GLOB SERPL: 1.4 G/DL
ALP SERPL-CCNC: 85 U/L (ref 30–99)
ALT SERPL-CCNC: 48 U/L (ref 2–50)
AMPHET UR QL SCN: POSITIVE
ANION GAP SERPL CALC-SCNC: 22 MMOL/L (ref 7–16)
AST SERPL-CCNC: 46 U/L (ref 12–45)
BARBITURATES UR QL SCN: NEGATIVE
BASOPHILS # BLD AUTO: 1.5 % (ref 0–1.8)
BASOPHILS # BLD: 0.14 K/UL (ref 0–0.12)
BENZODIAZ UR QL SCN: NEGATIVE
BILIRUB SERPL-MCNC: 0.4 MG/DL (ref 0.1–1.5)
BUN SERPL-MCNC: 13 MG/DL (ref 8–22)
BZE UR QL SCN: NEGATIVE
CALCIUM SERPL-MCNC: 8.8 MG/DL (ref 8.5–10.5)
CANNABINOIDS UR QL SCN: NEGATIVE
CHLORIDE SERPL-SCNC: 104 MMOL/L (ref 96–112)
CO2 SERPL-SCNC: 16 MMOL/L (ref 20–33)
CREAT SERPL-MCNC: 0.92 MG/DL (ref 0.5–1.4)
EOSINOPHIL # BLD AUTO: 0.01 K/UL (ref 0–0.51)
EOSINOPHIL NFR BLD: 0.1 % (ref 0–6.9)
ERYTHROCYTE [DISTWIDTH] IN BLOOD BY AUTOMATED COUNT: 49 FL (ref 35.9–50)
ETHANOL BLD-MCNC: 20.8 MG/DL
GFR SERPLBLD CREATININE-BSD FMLA CKD-EPI: 77 ML/MIN/1.73 M 2
GLOBULIN SER CALC-MCNC: 3.1 G/DL (ref 1.9–3.5)
GLUCOSE SERPL-MCNC: 104 MG/DL (ref 65–99)
HCT VFR BLD AUTO: 40.3 % (ref 37–47)
HGB BLD-MCNC: 14.1 G/DL (ref 12–16)
IMM GRANULOCYTES # BLD AUTO: 0.11 K/UL (ref 0–0.11)
IMM GRANULOCYTES NFR BLD AUTO: 1.2 % (ref 0–0.9)
LYMPHOCYTES # BLD AUTO: 2.79 K/UL (ref 1–4.8)
LYMPHOCYTES NFR BLD: 29.8 % (ref 22–41)
MCH RBC QN AUTO: 35.1 PG (ref 27–33)
MCHC RBC AUTO-ENTMCNC: 35 G/DL (ref 33.6–35)
MCV RBC AUTO: 100.2 FL (ref 81.4–97.8)
METHADONE UR QL SCN: NEGATIVE
MONOCYTES # BLD AUTO: 0.39 K/UL (ref 0–0.85)
MONOCYTES NFR BLD AUTO: 4.2 % (ref 0–13.4)
NEUTROPHILS # BLD AUTO: 5.92 K/UL (ref 2–7.15)
NEUTROPHILS NFR BLD: 63.2 % (ref 44–72)
NRBC # BLD AUTO: 0 K/UL
NRBC BLD-RTO: 0 /100 WBC
OPIATES UR QL SCN: NEGATIVE
OXYCODONE UR QL SCN: NEGATIVE
PCP UR QL SCN: NEGATIVE
PLATELET # BLD AUTO: 312 K/UL (ref 164–446)
PMV BLD AUTO: 9.3 FL (ref 9–12.9)
POC BREATHALIZER: 0.07 PERCENT (ref 0–0.01)
POTASSIUM SERPL-SCNC: 3.6 MMOL/L (ref 3.6–5.5)
PROPOXYPH UR QL SCN: NEGATIVE
PROT SERPL-MCNC: 7.4 G/DL (ref 6–8.2)
RBC # BLD AUTO: 4.02 M/UL (ref 4.2–5.4)
SODIUM SERPL-SCNC: 142 MMOL/L (ref 135–145)
WBC # BLD AUTO: 9.4 K/UL (ref 4.8–10.8)

## 2022-11-22 PROCEDURE — 80053 COMPREHEN METABOLIC PANEL: CPT

## 2022-11-22 PROCEDURE — 85025 COMPLETE CBC W/AUTO DIFF WBC: CPT

## 2022-11-22 PROCEDURE — 82077 ASSAY SPEC XCP UR&BREATH IA: CPT

## 2022-11-22 PROCEDURE — 302970 POC BREATHALIZER

## 2022-11-22 PROCEDURE — 80307 DRUG TEST PRSMV CHEM ANLYZR: CPT

## 2022-11-22 PROCEDURE — 36415 COLL VENOUS BLD VENIPUNCTURE: CPT

## 2022-11-22 PROCEDURE — 99284 EMERGENCY DEPT VISIT MOD MDM: CPT

## 2022-11-22 ASSESSMENT — FIBROSIS 4 INDEX: FIB4 SCORE: 0.9

## 2022-11-23 NOTE — ED TRIAGE NOTES
Chula Uribe  47 y.o. female  Chief Complaint   Patient presents with    ETOH Intoxication     Pt has been binge drinking because she's depressed. A+Ox3. Denies SI/HI, had one attempt years ago. Family called EMS because patient was making SI statements. Breathalyzer 0.108 on arrival. Pt is tearful and apologizing.   Pt reports occasional meth use, last use was several days ago.     Depression       Vitals:    11/22/22 2026   BP: (!) 150/76   Pulse: (!) 120   Resp: 14   Temp: 36.1 °C (96.9 °F)   SpO2: 96%

## 2022-11-23 NOTE — ED PROVIDER NOTES
ED Provider Note    CHIEF COMPLAINT  Chief Complaint   Patient presents with    ETOH Intoxication     Pt has been binge drinking because she's depressed. A+Ox3. Denies SI/HI, had one attempt years ago. Family called EMS because patient was making SI statements. Breathalyzer 0.108 on arrival. Pt is tearful and apologizing.   Pt reports occasional meth use, last use was several days ago.     Depression       HPI  Chula Uribe is a 47 y.o. female who presents to the emergency department after family concerned about patient's recent behavior.  Family and patient agree that she has had recurrent binge drinking.  Patient unable to articulate how much she is currently drinking.  Family states that the patient was reportedly making suicidal ideation commentary.  Family also reported that the patient occasionally uses methamphetamines.  Longstanding history of depression.    Limited history from patient given her current clinical condition and level of intoxication.  Currently denying any active attempt to harm self.    REVIEW OF SYSTEMS  See HPI for further details. All other systems are negative.     PAST MEDICAL HISTORY   has a past medical history of Anxiety.    SOCIAL HISTORY  Social History     Tobacco Use    Smoking status: Never    Smokeless tobacco: Never   Vaping Use    Vaping Use: Never used   Substance and Sexual Activity    Alcohol use: Yes     Alcohol/week: 3.0 oz     Types: 5 Shots of liquor per week     Comment: Binge drinks    Drug use: Yes     Comment: Meth occ    Sexual activity: Yes     Partners: Male     Birth control/protection: Condom       SURGICAL HISTORY  patient denies any surgical history    CURRENT MEDICATIONS  Home Medications       Reviewed by Devon Mena (Pharmacy Tech) on 11/22/22 at 7710  Med List Status: Unable to Obtain     Medication Last Dose Status   hydrOXYzine pamoate (VISTARIL) 50 MG Cap  Active   traZODone (DESYREL) 50 MG Tab  Active                    ALLERGIES  No  "Known Allergies    PHYSICAL EXAM  VITAL SIGNS: /59   Pulse (!) 107   Temp 36.1 °C (96.9 °F) (Temporal)   Resp 14   Ht 1.651 m (5' 5\")   Wt 79.4 kg (175 lb)   SpO2 96%   BMI 29.12 kg/m²  @AMPARO[[id number]::@  Pulse ox interpretation: I interpret this pulse ox as normal.  Constitutional: Alert in no apparent distress.  HENT: Normocephalic, Atraumatic, Bilateral external ears normal. Nose normal.   Eyes: Pupils are equal and reactive.   Heart: Regular rate and rythm, no murmurs.    Lungs: Clear to auscultation bilaterally.  Skin: Warm, Dry, No erythema, No rash.   Neurologic: Alert, Grossly non-focal.   Psychiatric: Depressed affect, tearful, intoxicated    Results for orders placed or performed during the hospital encounter of 11/22/22   CBC WITH DIFFERENTIAL   Result Value Ref Range    WBC 9.4 4.8 - 10.8 K/uL    RBC 4.02 (L) 4.20 - 5.40 M/uL    Hemoglobin 14.1 12.0 - 16.0 g/dL    Hematocrit 40.3 37.0 - 47.0 %    .2 (H) 81.4 - 97.8 fL    MCH 35.1 (H) 27.0 - 33.0 pg    MCHC 35.0 33.6 - 35.0 g/dL    RDW 49.0 35.9 - 50.0 fL    Platelet Count 312 164 - 446 K/uL    MPV 9.3 9.0 - 12.9 fL    Neutrophils-Polys 63.20 44.00 - 72.00 %    Lymphocytes 29.80 22.00 - 41.00 %    Monocytes 4.20 0.00 - 13.40 %    Eosinophils 0.10 0.00 - 6.90 %    Basophils 1.50 0.00 - 1.80 %    Immature Granulocytes 1.20 (H) 0.00 - 0.90 %    Nucleated RBC 0.00 /100 WBC    Neutrophils (Absolute) 5.92 2.00 - 7.15 K/uL    Lymphs (Absolute) 2.79 1.00 - 4.80 K/uL    Monos (Absolute) 0.39 0.00 - 0.85 K/uL    Eos (Absolute) 0.01 0.00 - 0.51 K/uL    Baso (Absolute) 0.14 (H) 0.00 - 0.12 K/uL    Immature Granulocytes (abs) 0.11 0.00 - 0.11 K/uL    NRBC (Absolute) 0.00 K/uL   COMP METABOLIC PANEL   Result Value Ref Range    Sodium 142 135 - 145 mmol/L    Potassium 3.6 3.6 - 5.5 mmol/L    Chloride 104 96 - 112 mmol/L    Co2 16 (L) 20 - 33 mmol/L    Anion Gap 22.0 (H) 7.0 - 16.0    Glucose 104 (H) 65 - 99 mg/dL    Bun 13 8 - 22 mg/dL    Creatinine " 0.92 0.50 - 1.40 mg/dL    Calcium 8.8 8.5 - 10.5 mg/dL    AST(SGOT) 46 (H) 12 - 45 U/L    ALT(SGPT) 48 2 - 50 U/L    Alkaline Phosphatase 85 30 - 99 U/L    Total Bilirubin 0.4 0.1 - 1.5 mg/dL    Albumin 4.3 3.2 - 4.9 g/dL    Total Protein 7.4 6.0 - 8.2 g/dL    Globulin 3.1 1.9 - 3.5 g/dL    A-G Ratio 1.4 g/dL   URINE DRUG SCREEN (TRIAGE)   Result Value Ref Range    Amphetamines Urine Positive (A) Negative    Barbiturates Negative Negative    Benzodiazepines Negative Negative    Cocaine Metabolite Negative Negative    Methadone Negative Negative    Opiates Negative Negative    Oxycodone Negative Negative    Phencyclidine -Pcp Negative Negative    Propoxyphene Negative Negative    Cannabinoid Metab Negative Negative   DIAGNOSTIC ALCOHOL   Result Value Ref Range    Diagnostic Alcohol 20.8 (H) <10.1 mg/dL   ESTIMATED GFR   Result Value Ref Range    GFR (CKD-EPI) 77 >60 mL/min/1.73 m 2   POC BREATHALIZER   Result Value Ref Range    POC Breathalizer 0.065 (A) 0.00 - 0.01 Percent       00 45: Nursing staff has updated me that the patient has eloped.  To nursing staff and myself the patient denied having any suicidal or homicidal ideations.  She denied any completion of self-harm tonight.  Her initial breathalyzer and hematology confirming no significant alcohol intoxication.  She has been on the legal limit throughout her observational care.  We were awaiting psychiatric evaluation however again the patient eloped.  At this point given the fact that the patient was not intoxicated and she had continued to deny suicidal ideations despite family's allegations she was not on a legal hold.    COURSE & MEDICAL DECISION MAKING  Pertinent Labs & Imaging studies reviewed. (See chart for details)    47-year-old female presenting to the emerged part with the above presentation.  Unfortunately the patient eloped prior to life skills evaluation.  I was unable to return to the room for any further conversation with the patient  regarding her current clinical presentation and the family's concerns.    FINAL IMPRESSION  1. Depression, unspecified depression type               Electronically signed by: Jermaine Yu M.D., 11/22/2022 9:01 PM

## 2023-04-11 NOTE — ED NOTES
Pt eloped from the ER. Pt was not on a legal hold. Alert team and ERP updated.    Rhofade Counseling: Rhofade is a topical medication which can decrease superficial blood flow where applied. Side effects are uncommon and include stinging, redness and allergic reactions.

## 2024-05-20 ENCOUNTER — OFFICE VISIT (OUTPATIENT)
Dept: URGENT CARE | Facility: CLINIC | Age: 49
End: 2024-05-20

## 2024-05-20 VITALS
WEIGHT: 194 LBS | DIASTOLIC BLOOD PRESSURE: 78 MMHG | BODY MASS INDEX: 32.32 KG/M2 | TEMPERATURE: 97.7 F | OXYGEN SATURATION: 97 % | HEIGHT: 65 IN | HEART RATE: 86 BPM | RESPIRATION RATE: 16 BRPM | SYSTOLIC BLOOD PRESSURE: 116 MMHG

## 2024-05-20 DIAGNOSIS — R11.0 NAUSEA: ICD-10-CM

## 2024-05-20 DIAGNOSIS — R51.9 INTRACTABLE HEADACHE, UNSPECIFIED CHRONICITY PATTERN, UNSPECIFIED HEADACHE TYPE: ICD-10-CM

## 2024-05-20 PROCEDURE — 99213 OFFICE O/P EST LOW 20 MIN: CPT | Performed by: FAMILY MEDICINE

## 2024-05-20 PROCEDURE — 3074F SYST BP LT 130 MM HG: CPT | Performed by: FAMILY MEDICINE

## 2024-05-20 PROCEDURE — 3078F DIAST BP <80 MM HG: CPT | Performed by: FAMILY MEDICINE

## 2024-05-20 RX ORDER — ONDANSETRON 4 MG/1
4 TABLET, FILM COATED ORAL EVERY 4 HOURS PRN
Qty: 20 TABLET | Refills: 0 | Status: SHIPPED | OUTPATIENT
Start: 2024-05-20

## 2024-05-20 RX ORDER — ONDANSETRON 4 MG/1
4 TABLET, ORALLY DISINTEGRATING ORAL ONCE
Status: COMPLETED | OUTPATIENT
Start: 2024-05-20 | End: 2024-05-20

## 2024-05-20 RX ORDER — KETOROLAC TROMETHAMINE 30 MG/ML
30 INJECTION, SOLUTION INTRAMUSCULAR; INTRAVENOUS ONCE
Status: COMPLETED | OUTPATIENT
Start: 2024-05-20 | End: 2024-05-20

## 2024-05-20 RX ADMIN — ONDANSETRON 4 MG: 4 TABLET, ORALLY DISINTEGRATING ORAL at 19:20

## 2024-05-20 RX ADMIN — KETOROLAC TROMETHAMINE 30 MG: 30 INJECTION, SOLUTION INTRAMUSCULAR; INTRAVENOUS at 19:19

## 2024-05-20 ASSESSMENT — ENCOUNTER SYMPTOMS
CONSTITUTIONAL NEGATIVE: 1
RESPIRATORY NEGATIVE: 1
DIZZINESS: 1
NAUSEA: 1
CARDIOVASCULAR NEGATIVE: 1
HEADACHES: 1
VOMITING: 1
EYES NEGATIVE: 1

## 2024-05-20 ASSESSMENT — FIBROSIS 4 INDEX: FIB4 SCORE: 1.02

## 2024-05-20 NOTE — LETTER
[facility]  [address] [place] [address]     May 20, 2024    Patient: Chula Uribe   YOB: 1975   Date of Visit: 5/20/2024       To Whom It May Concern:    Chula Uribe was seen and treated in our department on 5/20/2024. Please excuse work tonight.    Sincerely,     Derek Page M.D.

## 2024-05-21 NOTE — PROGRESS NOTES
"Subjective:   Chula Uribe is a 49 y.o. female who presents for Headache (X 4 days, headache, sinus pressure, nausea and vomiting, dizziness)      Headache      Review of Systems   Constitutional: Negative.    HENT: Negative.     Eyes: Negative.    Respiratory: Negative.     Cardiovascular: Negative.    Gastrointestinal:  Positive for nausea and vomiting.   Skin: Negative.    Neurological:  Positive for dizziness and headaches.       Medications, Allergies, and current problem list reviewed today in Epic.     Objective:     /78 (BP Location: Right arm, Patient Position: Sitting, BP Cuff Size: Large adult)   Pulse 86   Temp 36.5 °C (97.7 °F) (Temporal)   Resp 16   Ht 1.651 m (5' 5\")   Wt 88 kg (194 lb)   SpO2 97%     Physical Exam  Vitals and nursing note reviewed.   Constitutional:       Appearance: Normal appearance.   HENT:      Head: Normocephalic and atraumatic.      Nose: Nose normal.   Cardiovascular:      Rate and Rhythm: Normal rate and regular rhythm.      Pulses: Normal pulses.      Heart sounds: Normal heart sounds.   Pulmonary:      Effort: Pulmonary effort is normal.      Breath sounds: Normal breath sounds.   Abdominal:      General: Abdomen is flat.   Neurological:      General: No focal deficit present.      Mental Status: She is alert and oriented to person, place, and time. Mental status is at baseline.      Cranial Nerves: No cranial nerve deficit.      Sensory: No sensory deficit.      Motor: No weakness.      Coordination: Coordination normal.      Gait: Gait normal.      Deep Tendon Reflexes: Reflexes normal.         Assessment/Plan:     Diagnosis and associated orders:     1. Nausea  ondansetron (ZOFRAN) 4 MG Tab tablet    ondansetron (Zofran ODT) dispertab 4 mg      2. Intractable headache, unspecified chronicity pattern, unspecified headache type  ketorolac (Toradol) injection 30 mg         Comments/MDM:              Differential diagnosis, natural history, supportive care, " and indications for immediate follow-up discussed.    Advised the patient to follow-up with the primary care physician for recheck, reevaluation, and consideration of further management.    Please note that this dictation was created using voice recognition software. I have made a reasonable attempt to correct obvious errors, but I expect that there are errors of grammar and possibly content that I did not discover before finalizing the note.    This note was electronically signed by Derek Page M.D.

## 2025-02-23 ENCOUNTER — OFFICE VISIT (OUTPATIENT)
Dept: URGENT CARE | Facility: CLINIC | Age: 50
End: 2025-02-23
Payer: COMMERCIAL

## 2025-02-23 VITALS
TEMPERATURE: 97.8 F | WEIGHT: 222.5 LBS | RESPIRATION RATE: 12 BRPM | BODY MASS INDEX: 39.42 KG/M2 | HEART RATE: 80 BPM | SYSTOLIC BLOOD PRESSURE: 128 MMHG | DIASTOLIC BLOOD PRESSURE: 78 MMHG | OXYGEN SATURATION: 98 % | HEIGHT: 63 IN

## 2025-02-23 DIAGNOSIS — S76.012A STRAIN OF FLEXOR MUSCLE OF LEFT HIP, INITIAL ENCOUNTER: ICD-10-CM

## 2025-02-23 PROCEDURE — 3078F DIAST BP <80 MM HG: CPT | Performed by: PHYSICIAN ASSISTANT

## 2025-02-23 PROCEDURE — 99213 OFFICE O/P EST LOW 20 MIN: CPT | Performed by: PHYSICIAN ASSISTANT

## 2025-02-23 PROCEDURE — 3074F SYST BP LT 130 MM HG: CPT | Performed by: PHYSICIAN ASSISTANT

## 2025-02-23 RX ORDER — CYCLOBENZAPRINE HCL 5 MG
5-10 TABLET ORAL NIGHTLY PRN
Qty: 20 TABLET | Refills: 0 | Status: SHIPPED | OUTPATIENT
Start: 2025-02-23

## 2025-02-23 RX ORDER — IBUPROFEN 800 MG/1
800 TABLET, FILM COATED ORAL EVERY 8 HOURS PRN
Qty: 30 TABLET | Refills: 0 | Status: SHIPPED | OUTPATIENT
Start: 2025-02-23

## 2025-02-23 ASSESSMENT — ENCOUNTER SYMPTOMS
FEVER: 0
LEG PAIN: 1
EYE REDNESS: 0
EYE DISCHARGE: 0
NAUSEA: 0
VOMITING: 0